# Patient Record
Sex: FEMALE | Race: WHITE | NOT HISPANIC OR LATINO | Employment: FULL TIME | URBAN - METROPOLITAN AREA
[De-identification: names, ages, dates, MRNs, and addresses within clinical notes are randomized per-mention and may not be internally consistent; named-entity substitution may affect disease eponyms.]

---

## 2017-01-18 ENCOUNTER — HOSPITAL ENCOUNTER (OUTPATIENT)
Dept: MAMMOGRAPHY | Facility: HOSPITAL | Age: 57
Discharge: HOME | End: 2017-01-18
Admitting: FAMILY MEDICINE

## 2017-01-18 DIAGNOSIS — Z12.31 VISIT FOR SCREENING MAMMOGRAM: ICD-10-CM

## 2017-01-18 PROCEDURE — 77063 BREAST TOMOSYNTHESIS BI: CPT

## 2017-01-18 PROCEDURE — 77063 BREAST TOMOSYNTHESIS BI: CPT | Performed by: RADIOLOGY

## 2017-01-18 PROCEDURE — 77067 SCR MAMMO BI INCL CAD: CPT | Performed by: RADIOLOGY

## 2017-01-18 PROCEDURE — G0202 SCR MAMMO BI INCL CAD: HCPCS

## 2017-01-20 ENCOUNTER — TELEPHONE (OUTPATIENT)
Dept: INTERNAL MEDICINE | Facility: CLINIC | Age: 57
End: 2017-01-20

## 2017-01-20 RX ORDER — OMEPRAZOLE 20 MG/1
20 CAPSULE, DELAYED RELEASE ORAL DAILY
Qty: 30 CAPSULE | Refills: 5 | Status: SHIPPED | OUTPATIENT
Start: 2017-01-20 | End: 2017-06-08 | Stop reason: SDUPTHER

## 2017-01-20 NOTE — TELEPHONE ENCOUNTER
----- Message from Lucila Coleman sent at 1/20/2017  9:42 AM EST -----  Patient called to get a refill of omeprazole called in to [facility] in [place], please. She has none left. Thanks!

## 2017-01-25 ENCOUNTER — HOSPITAL ENCOUNTER (OUTPATIENT)
Dept: ULTRASOUND IMAGING | Facility: HOSPITAL | Age: 57
Discharge: HOME | End: 2017-01-25
Admitting: FAMILY MEDICINE

## 2017-01-25 DIAGNOSIS — R92.8 ABNORMAL MAMMOGRAM: ICD-10-CM

## 2017-01-25 PROCEDURE — 76642 ULTRASOUND BREAST LIMITED: CPT

## 2017-01-25 PROCEDURE — 76642 ULTRASOUND BREAST LIMITED: CPT | Performed by: RADIOLOGY

## 2017-03-02 ENCOUNTER — OFFICE VISIT (OUTPATIENT)
Dept: INTERNAL MEDICINE | Facility: CLINIC | Age: 57
End: 2017-03-02

## 2017-03-02 VITALS
DIASTOLIC BLOOD PRESSURE: 90 MMHG | WEIGHT: 220.6 LBS | TEMPERATURE: 98.3 F | OXYGEN SATURATION: 97 % | SYSTOLIC BLOOD PRESSURE: 126 MMHG | HEIGHT: 66 IN | BODY MASS INDEX: 35.45 KG/M2 | HEART RATE: 80 BPM

## 2017-03-02 DIAGNOSIS — E03.9 ACQUIRED HYPOTHYROIDISM: ICD-10-CM

## 2017-03-02 DIAGNOSIS — F51.01 PRIMARY INSOMNIA: ICD-10-CM

## 2017-03-02 DIAGNOSIS — Z00.00 ANNUAL PHYSICAL EXAM: Primary | ICD-10-CM

## 2017-03-02 DIAGNOSIS — E78.5 HYPERLIPIDEMIA, UNSPECIFIED HYPERLIPIDEMIA TYPE: ICD-10-CM

## 2017-03-02 DIAGNOSIS — H91.93 DECREASED HEARING OF BOTH EARS: ICD-10-CM

## 2017-03-02 LAB
ALBUMIN SERPL-MCNC: 4.4 G/DL (ref 3.2–4.8)
ALBUMIN/GLOB SERPL: 1.4 G/DL (ref 1.5–2.5)
ALP SERPL-CCNC: 79 U/L (ref 25–100)
ALT SERPL W P-5'-P-CCNC: 18 U/L (ref 7–40)
ANION GAP SERPL CALCULATED.3IONS-SCNC: 6 MMOL/L (ref 3–11)
ARTICHOKE IGE QN: 179 MG/DL (ref 0–130)
AST SERPL-CCNC: 18 U/L (ref 0–33)
BASOPHILS # BLD AUTO: 0.03 10*3/MM3 (ref 0–0.2)
BASOPHILS NFR BLD AUTO: 0.5 % (ref 0–1)
BILIRUB BLD-MCNC: NEGATIVE MG/DL
BILIRUB SERPL-MCNC: 0.4 MG/DL (ref 0.3–1.2)
BUN BLD-MCNC: 13 MG/DL (ref 9–23)
BUN/CREAT SERPL: 21.7 (ref 7–25)
CALCIUM SPEC-SCNC: 9.7 MG/DL (ref 8.7–10.4)
CHLORIDE SERPL-SCNC: 103 MMOL/L (ref 99–109)
CHOLEST SERPL-MCNC: 236 MG/DL (ref 0–200)
CLARITY, POC: CLEAR
CO2 SERPL-SCNC: 28 MMOL/L (ref 20–31)
COLOR UR: YELLOW
CREAT BLD-MCNC: 0.6 MG/DL (ref 0.6–1.3)
DEPRECATED RDW RBC AUTO: 40.1 FL (ref 37–54)
DEVELOPER EXPIRATION DATE: NORMAL
DEVELOPER LOT NUMBER: NORMAL
EOSINOPHIL # BLD AUTO: 0.13 10*3/MM3 (ref 0.1–0.3)
EOSINOPHIL NFR BLD AUTO: 2.1 % (ref 0–3)
ERYTHROCYTE [DISTWIDTH] IN BLOOD BY AUTOMATED COUNT: 12.6 % (ref 11.3–14.5)
EXPIRATION DATE: NORMAL
FECAL OCCULT BLOOD SCREEN, POC: NEGATIVE
GFR SERPL CREATININE-BSD FRML MDRD: 104 ML/MIN/1.73
GLOBULIN UR ELPH-MCNC: 3.1 GM/DL
GLUCOSE BLD-MCNC: 83 MG/DL (ref 70–100)
GLUCOSE UR STRIP-MCNC: NEGATIVE MG/DL
HCT VFR BLD AUTO: 42.1 % (ref 34.5–44)
HDLC SERPL-MCNC: 57 MG/DL (ref 40–60)
HGB BLD-MCNC: 14.4 G/DL (ref 11.5–15.5)
IMM GRANULOCYTES # BLD: 0.03 10*3/MM3 (ref 0–0.03)
IMM GRANULOCYTES NFR BLD: 0.5 % (ref 0–0.6)
KETONES UR QL: NEGATIVE
LEUKOCYTE EST, POC: NEGATIVE
LYMPHOCYTES # BLD AUTO: 2.75 10*3/MM3 (ref 0.6–4.8)
LYMPHOCYTES NFR BLD AUTO: 43.5 % (ref 24–44)
Lab: NORMAL
MCH RBC QN AUTO: 30.1 PG (ref 27–31)
MCHC RBC AUTO-ENTMCNC: 34.2 G/DL (ref 32–36)
MCV RBC AUTO: 87.9 FL (ref 80–99)
MONOCYTES # BLD AUTO: 0.48 10*3/MM3 (ref 0–1)
MONOCYTES NFR BLD AUTO: 7.6 % (ref 0–12)
NEGATIVE CONTROL: NEGATIVE
NEUTROPHILS # BLD AUTO: 2.9 10*3/MM3 (ref 1.5–8.3)
NEUTROPHILS NFR BLD AUTO: 45.8 % (ref 41–71)
NITRITE UR-MCNC: NEGATIVE MG/ML
PH UR: 7 [PH] (ref 5–8)
PLATELET # BLD AUTO: 195 10*3/MM3 (ref 150–450)
PMV BLD AUTO: 10.9 FL (ref 6–12)
POSITIVE CONTROL: POSITIVE
POTASSIUM BLD-SCNC: 4.2 MMOL/L (ref 3.5–5.5)
PROT SERPL-MCNC: 7.5 G/DL (ref 5.7–8.2)
PROT UR STRIP-MCNC: NEGATIVE MG/DL
RBC # BLD AUTO: 4.79 10*6/MM3 (ref 3.89–5.14)
RBC # UR STRIP: NEGATIVE /UL
SODIUM BLD-SCNC: 137 MMOL/L (ref 132–146)
SP GR UR: 1.01 (ref 1–1.03)
T4 FREE SERPL-MCNC: 1.49 NG/DL (ref 0.89–1.76)
TRIGL SERPL-MCNC: 127 MG/DL (ref 0–150)
TSH SERPL DL<=0.05 MIU/L-ACNC: 1.64 MIU/ML (ref 0.35–5.35)
UROBILINOGEN UR QL: NORMAL
WBC NRBC COR # BLD: 6.32 10*3/MM3 (ref 3.5–10.8)

## 2017-03-02 PROCEDURE — 80053 COMPREHEN METABOLIC PANEL: CPT | Performed by: FAMILY MEDICINE

## 2017-03-02 PROCEDURE — 84443 ASSAY THYROID STIM HORMONE: CPT | Performed by: FAMILY MEDICINE

## 2017-03-02 PROCEDURE — 84439 ASSAY OF FREE THYROXINE: CPT | Performed by: FAMILY MEDICINE

## 2017-03-02 PROCEDURE — 99396 PREV VISIT EST AGE 40-64: CPT | Performed by: FAMILY MEDICINE

## 2017-03-02 PROCEDURE — 81003 URINALYSIS AUTO W/O SCOPE: CPT | Performed by: FAMILY MEDICINE

## 2017-03-02 PROCEDURE — 85025 COMPLETE CBC W/AUTO DIFF WBC: CPT | Performed by: FAMILY MEDICINE

## 2017-03-02 PROCEDURE — 82270 OCCULT BLOOD FECES: CPT | Performed by: FAMILY MEDICINE

## 2017-03-02 PROCEDURE — 80061 LIPID PANEL: CPT | Performed by: FAMILY MEDICINE

## 2017-03-02 RX ORDER — NAPROXEN 500 MG/1
500 TABLET ORAL NIGHTLY PRN
Qty: 30 TABLET | Refills: 2 | Status: SHIPPED | OUTPATIENT
Start: 2017-03-02 | End: 2017-06-08 | Stop reason: SDUPTHER

## 2017-03-02 RX ORDER — NAPROXEN 500 MG/1
1 TABLET ORAL NIGHTLY PRN
Refills: 0 | COMMUNITY
Start: 2017-01-29 | End: 2017-03-02 | Stop reason: SDUPTHER

## 2017-03-02 RX ORDER — FLUTICASONE PROPIONATE 50 MCG
2 SPRAY, SUSPENSION (ML) NASAL DAILY
Qty: 1 EACH | Refills: 5 | Status: SHIPPED | OUTPATIENT
Start: 2017-03-02 | End: 2017-06-08 | Stop reason: SDUPTHER

## 2017-03-02 RX ORDER — ZOLPIDEM TARTRATE 10 MG/1
10 TABLET ORAL NIGHTLY PRN
Qty: 30 TABLET | Refills: 2 | Status: SHIPPED | OUTPATIENT
Start: 2017-03-02 | End: 2017-05-29 | Stop reason: SDUPTHER

## 2017-03-02 NOTE — PROGRESS NOTES
"Wendy Lincoln is a 56 y.o. female.     Chief Complaint   Patient presents with   • Annual Exam     just got over the flu       Vitals:    03/02/17 0903   BP: 126/90   Pulse: 80   Temp: 98.3 °F (36.8 °C)   SpO2: 97%   Weight: 220 lb 9.6 oz (100 kg)   Height: 65.8\" (167.1 cm)       Insomnia   This is a chronic problem. The current episode started more than 1 year ago. The problem occurs constantly. The problem has been unchanged. Associated symptoms include arthralgias. Pertinent negatives include no abdominal pain, anorexia, change in bowel habit, chest pain, chills, congestion, coughing, diaphoresis, fatigue, fever, headaches, joint swelling, myalgias, nausea, neck pain, numbness, rash, sore throat, swollen glands, urinary symptoms, vertigo, visual change, vomiting or weakness. Nothing aggravates the symptoms. Treatments tried: ambien. The treatment provided significant relief.      Pt here for annual exam. Pt is s/p hysterectomy. Pt needs refill ambien.   Pt recently had the flu and had been on zyrtec D.   Pt has noticed decreased hearing with cell phone, that started before recent illness.   The following portions of the patient's history were reviewed and updated as appropriate: allergies, current medications, past family history, past medical history, past social history, past surgical history and problem list.    Past Medical History   Diagnosis Date   • Anxiety    • Breast cyst 01/25/2017     right on sono   • Calculus of kidney    • Carbuncle and furuncle of buttock 08/16/2012   • Chalazion    • GERD (gastroesophageal reflux disease)    • Goiter    • Hyperlipidemia    • Hypothyroid    • Insomnia    • Persistent disorder of initiating or maintaining sleep    • Routine general medical examination at a health care facility 02/19/2013   • Routine general medical examination at a health care facility 02/16/2012   • Sacroiliac pain    • Sicca syndrome    • Sleep apnea    • Visit for routine gyn exam " 02/16/2012       Past Surgical History   Procedure Laterality Date   • Replacement total knee bilateral     • Total abdominal hysterectomy with salpingo oophorectomy  2002     Allergies   Allergen Reactions   • Erythromycin Nausea Only   • Morphine And Related    • Requip [Ropinirole Hcl] Nausea Only       Family History   Problem Relation Age of Onset   • Thyroid disease Mother    • Memory loss Mother    • Alzheimer's disease Mother    • Other Mother      BCCA; Blood Clot   • Heart disease Father    • Atrial fibrillation Father    • Cirrhosis Father    • Depression Father    • Other Father      SCCA   • Alzheimer's disease Maternal Aunt    • Breast cancer Maternal Grandmother 80   • Ovarian cancer Neg Hx        Social History     Social History   • Marital status:      Spouse name: N/A   • Number of children: N/A   • Years of education: N/A     Occupational History   • Not on file.     Social History Main Topics   • Smoking status: Former Smoker   • Smokeless tobacco: Never Used      Comment: quit 30 + years   • Alcohol use 3.0 oz/week     5 Glasses of wine per week      Comment: red wine   • Drug use: No   • Sexual activity: Yes     Birth control/ protection: None     Other Topics Concern   • Not on file     Social History Narrative       Review of Systems   Constitutional: Negative.  Negative for activity change, appetite change, chills, diaphoresis, fatigue, fever and unexpected weight change.   HENT: Positive for hearing loss, postnasal drip and rhinorrhea. Negative for congestion, dental problem, drooling, ear discharge, ear pain, facial swelling, mouth sores, nosebleeds, sinus pressure, sneezing, sore throat, tinnitus, trouble swallowing and voice change.    Eyes: Negative.  Negative for photophobia, pain, discharge, redness, itching and visual disturbance.   Respiratory: Negative.  Negative for apnea, cough, choking, chest tightness, shortness of breath, wheezing and stridor.    Cardiovascular:  Negative.  Negative for chest pain, palpitations and leg swelling.   Gastrointestinal: Negative.  Negative for abdominal distention, abdominal pain, anal bleeding, anorexia, blood in stool, change in bowel habit, constipation, diarrhea, nausea, rectal pain and vomiting.   Endocrine: Negative.  Negative for cold intolerance, heat intolerance, polydipsia, polyphagia and polyuria.   Genitourinary: Negative.  Negative for decreased urine volume, difficulty urinating, dyspareunia, dysuria, enuresis, flank pain, frequency, genital sores, hematuria, menstrual problem, pelvic pain, urgency, vaginal bleeding, vaginal discharge and vaginal pain.   Musculoskeletal: Positive for arthralgias. Negative for back pain, gait problem, joint swelling, myalgias, neck pain and neck stiffness.   Skin: Negative.  Negative for color change, pallor, rash and wound.   Allergic/Immunologic: Negative.  Negative for environmental allergies, food allergies and immunocompromised state.   Neurological: Negative.  Negative for dizziness, vertigo, tremors, seizures, syncope, facial asymmetry, speech difficulty, weakness, light-headedness, numbness and headaches.   Hematological: Negative.  Negative for adenopathy. Does not bruise/bleed easily.   Psychiatric/Behavioral: Negative for agitation, behavioral problems, confusion, decreased concentration, dysphoric mood, hallucinations, self-injury, sleep disturbance and suicidal ideas. The patient has insomnia. The patient is not nervous/anxious and is not hyperactive.        Objective   Physical Exam   Constitutional: She is oriented to person, place, and time. She appears well-developed and well-nourished. No distress.   HENT:   Head: Normocephalic and atraumatic.   Right Ear: Tympanic membrane, external ear and ear canal normal.   Left Ear: Tympanic membrane, external ear and ear canal normal.   Nose: Rhinorrhea present. No sinus tenderness. Right sinus exhibits no maxillary sinus tenderness and no  frontal sinus tenderness. Left sinus exhibits no maxillary sinus tenderness and no frontal sinus tenderness.   Mouth/Throat: Uvula is midline, oropharynx is clear and moist and mucous membranes are normal. No oropharyngeal exudate, posterior oropharyngeal edema or posterior oropharyngeal erythema. Tonsils are 3+ on the right. Tonsils are 3+ on the left. No tonsillar exudate.   Eyes: Conjunctivae, EOM and lids are normal. Pupils are equal, round, and reactive to light. Right eye exhibits no chemosis, no discharge, no exudate and no hordeolum. No foreign body present in the right eye. Left eye exhibits no chemosis, no discharge and no exudate. No foreign body present in the left eye. No scleral icterus.   Fundoscopic exam:       The right eye shows no AV nicking, no exudate, no hemorrhage and no papilledema. The right eye shows red reflex.        The left eye shows no AV nicking, no exudate, no hemorrhage and no papilledema. The left eye shows red reflex.   Neck: Trachea normal and normal range of motion. Neck supple. Carotid bruit is not present. No tracheal deviation present. No thyroid mass and no thyromegaly present.   Cardiovascular: Normal rate, regular rhythm, normal heart sounds and intact distal pulses.  Exam reveals no gallop and no friction rub.    No murmur heard.  Pulmonary/Chest: Effort normal and breath sounds normal. No respiratory distress. She has no decreased breath sounds. She has no wheezes. She has no rhonchi. She has no rales. Chest wall is not dull to percussion. She exhibits no tenderness. Right breast exhibits no inverted nipple, no mass, no nipple discharge, no skin change and no tenderness. Left breast exhibits no inverted nipple, no mass, no nipple discharge, no skin change and no tenderness.   Abdominal: Soft. Bowel sounds are normal. She exhibits no distension and no mass. There is no hepatosplenomegaly. There is no tenderness. There is no rebound and no guarding. Hernia confirmed  negative in the right inguinal area and confirmed negative in the left inguinal area.   Genitourinary: Rectum normal. Rectal exam shows no external hemorrhoid, no internal hemorrhoid, no fissure, no mass, no tenderness, anal tone normal and guaiac negative stool. There is no rash, tenderness, lesion or injury on the right labia. There is no rash, tenderness, lesion or injury on the left labia.   Genitourinary Comments: Uterus surgically absent.   Musculoskeletal: Normal range of motion. She exhibits no edema, tenderness or deformity.   Lymphadenopathy:     She has no cervical adenopathy.        Right: No inguinal adenopathy present.        Left: No inguinal adenopathy present.   Neurological: She is alert and oriented to person, place, and time. She has normal reflexes. No cranial nerve deficit. Coordination normal.   Skin: Skin is warm and dry. No rash noted. She is not diaphoretic.   Psychiatric: She has a normal mood and affect. Her behavior is normal. Judgment and thought content normal.   Nursing note and vitals reviewed.      Assessment/Plan   Ana was seen today for annual exam.    Diagnoses and all orders for this visit:    Annual physical exam  -     POCT urinalysis dipstick, automated  -     CBC & Differential  -     POC Occult Blood Stool    Primary insomnia  -     zolpidem (AMBIEN) 10 MG tablet; Take 1 tablet by mouth At Night As Needed for sleep.    Decreased hearing of both ears  -     Ambulatory Referral to Audiology    Hyperlipidemia, unspecified hyperlipidemia type  -     Comprehensive Metabolic Panel  -     Lipid Panel    Acquired hypothyroidism  -     TSH  -     T4, Free    Other orders  -     naproxen (NAPROSYN) 500 MG tablet; Take 1 tablet by mouth At Night As Needed for mild pain (1-3).  -     fluticasone (FLONASE) 50 MCG/ACT nasal spray; 2 sprays into each nostril Daily. Administer 2 sprays in each nostril for each dose.        Pt has had mammo in January.  Pt is up to date on tetanus,  influenza and colonoscopy.  Pt had hysterectomy, early pap not indicated.   Pt to check with her  if she snores  Healthy eating and regular exercise         Current Outpatient Prescriptions:   •  cetirizine (ZyrTEC) 10 MG tablet, Take 10 mg by mouth daily., Disp: , Rfl:   •  fluticasone (FLONASE) 50 MCG/ACT nasal spray, 2 sprays into each nostril Daily. Administer 2 sprays in each nostril for each dose., Disp: 1 each, Rfl: 5  •  levothyroxine (SYNTHROID, LEVOTHROID) 150 MCG tablet, Take 1 tablet by mouth Daily., Disp: 90 tablet, Rfl: 1  •  omeprazole (priLOSEC) 20 MG capsule, Take 1 capsule by mouth Daily., Disp: 30 capsule, Rfl: 5  •  zolpidem (AMBIEN) 10 MG tablet, Take 1 tablet by mouth At Night As Needed for sleep., Disp: 30 tablet, Rfl: 2  •  naproxen (NAPROSYN) 500 MG tablet, Take 1 tablet by mouth At Night As Needed for mild pain (1-3)., Disp: 30 tablet, Rfl: 2    Return in about 2 weeks (around 3/16/2017), or if symptoms worsen or fail to improve, for Recheck bp with nurse.

## 2017-03-02 NOTE — PROGRESS NOTES
Please call the patient regarding her abnormal result. Lipids high, low animal fat, low sugar diet. If already following the diet add statins. LDL has increased since last lab.

## 2017-03-03 ENCOUNTER — TELEPHONE (OUTPATIENT)
Dept: INTERNAL MEDICINE | Facility: CLINIC | Age: 57
End: 2017-03-03

## 2017-03-03 NOTE — TELEPHONE ENCOUNTER
----- Message from Bryanna PEREA MD sent at 3/2/2017  6:25 PM EST -----  Please call the patient regarding her abnormal result. Lipids high, low animal fat, low sugar diet. If already following the diet add statins. LDL has increased since last lab.

## 2017-03-10 ENCOUNTER — TELEPHONE (OUTPATIENT)
Dept: INTERNAL MEDICINE | Facility: CLINIC | Age: 57
End: 2017-03-10

## 2017-03-10 NOTE — TELEPHONE ENCOUNTER
----- Message from Bryanna PEREA MD sent at 3/2/2017  9:49 AM EST -----  Regarding: lip biopsy  Check with Dr Guerrero office for lip bx path report

## 2017-05-21 DIAGNOSIS — E03.9 ACQUIRED HYPOTHYROIDISM: ICD-10-CM

## 2017-05-22 RX ORDER — LEVOTHYROXINE SODIUM 0.15 MG/1
TABLET ORAL
Qty: 90 TABLET | Refills: 0 | Status: SHIPPED | OUTPATIENT
Start: 2017-05-22 | End: 2017-06-08 | Stop reason: SDUPTHER

## 2017-05-29 DIAGNOSIS — F51.01 PRIMARY INSOMNIA: ICD-10-CM

## 2017-05-30 RX ORDER — ZOLPIDEM TARTRATE 10 MG/1
TABLET ORAL
Qty: 30 TABLET | Refills: 0 | Status: SHIPPED | OUTPATIENT
Start: 2017-05-30 | End: 2017-06-08 | Stop reason: SDUPTHER

## 2017-06-08 ENCOUNTER — OFFICE VISIT (OUTPATIENT)
Dept: INTERNAL MEDICINE | Facility: CLINIC | Age: 57
End: 2017-06-08

## 2017-06-08 VITALS
BODY MASS INDEX: 35.97 KG/M2 | DIASTOLIC BLOOD PRESSURE: 84 MMHG | OXYGEN SATURATION: 98 % | HEIGHT: 66 IN | HEART RATE: 79 BPM | TEMPERATURE: 97.8 F | SYSTOLIC BLOOD PRESSURE: 124 MMHG | WEIGHT: 223.8 LBS

## 2017-06-08 DIAGNOSIS — E03.9 ACQUIRED HYPOTHYROIDISM: ICD-10-CM

## 2017-06-08 DIAGNOSIS — F51.01 PRIMARY INSOMNIA: ICD-10-CM

## 2017-06-08 DIAGNOSIS — E78.5 HYPERLIPIDEMIA, UNSPECIFIED HYPERLIPIDEMIA TYPE: Primary | ICD-10-CM

## 2017-06-08 DIAGNOSIS — K21.9 GASTROESOPHAGEAL REFLUX DISEASE WITHOUT ESOPHAGITIS: ICD-10-CM

## 2017-06-08 DIAGNOSIS — J30.89 PERENNIAL ALLERGIC RHINITIS, UNSPECIFIED ALLERGIC RHINITIS TRIGGER: ICD-10-CM

## 2017-06-08 PROCEDURE — 99214 OFFICE O/P EST MOD 30 MIN: CPT | Performed by: FAMILY MEDICINE

## 2017-06-08 RX ORDER — NAPROXEN 500 MG/1
500 TABLET ORAL NIGHTLY PRN
Qty: 30 TABLET | Refills: 5 | Status: SHIPPED | OUTPATIENT
Start: 2017-06-08 | End: 2017-12-15 | Stop reason: SDUPTHER

## 2017-06-08 RX ORDER — FLUTICASONE PROPIONATE 50 MCG
2 SPRAY, SUSPENSION (ML) NASAL DAILY
Qty: 1 EACH | Refills: 5 | Status: SHIPPED | OUTPATIENT
Start: 2017-06-08 | End: 2017-12-15 | Stop reason: SDUPTHER

## 2017-06-08 RX ORDER — ZOLPIDEM TARTRATE 10 MG/1
10 TABLET ORAL NIGHTLY PRN
Qty: 30 TABLET | Refills: 0 | Status: SHIPPED | OUTPATIENT
Start: 2017-06-08 | End: 2017-09-15 | Stop reason: SDUPTHER

## 2017-06-08 RX ORDER — OMEPRAZOLE 20 MG/1
20 CAPSULE, DELAYED RELEASE ORAL DAILY
Qty: 30 CAPSULE | Refills: 5 | Status: SHIPPED | OUTPATIENT
Start: 2017-06-08 | End: 2017-12-15 | Stop reason: SDUPTHER

## 2017-06-08 RX ORDER — LEVOTHYROXINE SODIUM 0.15 MG/1
150 TABLET ORAL DAILY
Qty: 30 TABLET | Refills: 5 | Status: SHIPPED | OUTPATIENT
Start: 2017-06-08 | End: 2017-12-15 | Stop reason: SDUPTHER

## 2017-06-08 NOTE — PROGRESS NOTES
"Subjective   Ana Lincoln is a 56 y.o. female.     Chief Complaint   Patient presents with   • Hyperlipidemia     3 month follow up   • Hypothyroidism   • Insomnia   • Med Refill       Visit Vitals   • /84   • Pulse 79   • Temp 97.8 °F (36.6 °C)   • Ht 65.8\" (167.1 cm)   • Wt 223 lb 12.8 oz (102 kg)   • LMP  (LMP Unknown)   • SpO2 98%   • BMI 36.34 kg/m2       Hyperlipidemia   This is a new problem. This is a new diagnosis. The problem is uncontrolled. Recent lipid tests were reviewed and are high. Exacerbating diseases include hypothyroidism and obesity. She has no history of chronic renal disease, diabetes or nephrotic syndrome. Pertinent negatives include no chest pain, focal sensory loss, focal weakness, leg pain, myalgias or shortness of breath. She is currently on no antihyperlipidemic treatment. Compliance problems include adherence to diet.  Risk factors for coronary artery disease include dyslipidemia and a sedentary lifestyle.   Hypothyroidism   This is a chronic problem. The current episode started more than 1 year ago. The problem occurs constantly. The problem has been unchanged. Associated symptoms include arthralgias and congestion. Pertinent negatives include no abdominal pain, anorexia, change in bowel habit, chest pain, chills, coughing, diaphoresis, fatigue, fever, headaches, joint swelling, myalgias, nausea, neck pain, numbness, rash, sore throat, swollen glands, urinary symptoms, vertigo, visual change, vomiting or weakness. Nothing aggravates the symptoms. Treatments tried: thyroid. The treatment provided significant relief.   Insomnia   This is a chronic problem. The current episode started more than 1 year ago. The problem occurs constantly. The problem has been unchanged. Associated symptoms include arthralgias and congestion. Pertinent negatives include no abdominal pain, anorexia, change in bowel habit, chest pain, chills, coughing, diaphoresis, fatigue, fever, headaches, joint " swelling, myalgias, nausea, neck pain, numbness, rash, sore throat, swollen glands, urinary symptoms, vertigo, visual change, vomiting or weakness. Nothing aggravates the symptoms. Treatments tried: ambien. The treatment provided significant relief.        The following portions of the patient's history were reviewed and updated as appropriate: allergies, current medications, past family history, past medical history, past social history, past surgical history and problem list.     Past Medical History:   Diagnosis Date   • Anxiety    • Breast cyst 01/25/2017    right on sono   • Calculus of kidney    • Carbuncle and furuncle of buttock 08/16/2012   • Chalazion    • GERD (gastroesophageal reflux disease)    • Goiter    • Hyperlipidemia    • Hypothyroid    • Insomnia    • Persistent disorder of initiating or maintaining sleep    • Routine general medical examination at a health care facility 02/19/2013   • Routine general medical examination at a health care facility 02/16/2012   • Sacroiliac pain    • Sicca syndrome    • Sleep apnea    • Visit for routine gyn exam 02/16/2012       Past Surgical History:   Procedure Laterality Date   • REPLACEMENT TOTAL KNEE BILATERAL     • TOTAL ABDOMINAL HYSTERECTOMY WITH SALPINGO OOPHORECTOMY  2002       Allergies   Allergen Reactions   • Erythromycin Nausea Only   • Morphine And Related    • Requip [Ropinirole Hcl] Nausea Only       Family History   Problem Relation Age of Onset   • Thyroid disease Mother    • Memory loss Mother    • Alzheimer's disease Mother    • Other Mother      BCCA; Blood Clot   • Heart disease Father    • Atrial fibrillation Father    • Cirrhosis Father    • Depression Father    • Other Father      SCCA   • Alzheimer's disease Maternal Aunt    • Breast cancer Maternal Grandmother 80   • Ovarian cancer Neg Hx        Social History     Social History   • Marital status:      Spouse name: N/A   • Number of children: N/A   • Years of education: N/A      Occupational History   • Not on file.     Social History Main Topics   • Smoking status: Former Smoker   • Smokeless tobacco: Never Used      Comment: quit 30 + years   • Alcohol use 3.0 oz/week     5 Glasses of wine per week      Comment: red wine   • Drug use: No   • Sexual activity: Yes     Birth control/ protection: None     Other Topics Concern   • Not on file     Social History Narrative         Review of Systems   Constitutional: Negative.  Negative for chills, diaphoresis, fatigue and fever.   HENT: Positive for congestion, postnasal drip and rhinorrhea. Negative for ear pain, nosebleeds, sinus pressure, sneezing and sore throat.    Eyes: Negative.  Negative for redness and itching.   Respiratory: Negative.  Negative for cough, shortness of breath and wheezing.    Cardiovascular: Negative.  Negative for chest pain and palpitations.   Gastrointestinal: Negative.  Negative for abdominal pain, anorexia, change in bowel habit, constipation, diarrhea, nausea and vomiting.   Endocrine: Negative.  Negative for cold intolerance and heat intolerance.   Genitourinary: Negative.  Negative for dysuria, frequency, hematuria and urgency.   Musculoskeletal: Positive for arthralgias. Negative for back pain, joint swelling, myalgias and neck pain.   Skin: Negative.  Negative for color change and rash.   Allergic/Immunologic: Positive for environmental allergies.   Neurological: Negative.  Negative for dizziness, vertigo, focal weakness, syncope, weakness, light-headedness, numbness and headaches.   Hematological: Negative.  Negative for adenopathy. Does not bruise/bleed easily.   Psychiatric/Behavioral: Negative for dysphoric mood. The patient has insomnia. The patient is not nervous/anxious.        Objective   Physical Exam   Constitutional: She is oriented to person, place, and time. She appears well-developed.   HENT:   Head: Normocephalic.   Right Ear: Tympanic membrane, external ear and ear canal normal.   Left Ear:  Tympanic membrane, external ear and ear canal normal.   Nose: Nose normal.   Mouth/Throat: Uvula is midline, oropharynx is clear and moist and mucous membranes are normal. No oropharyngeal exudate, posterior oropharyngeal edema or posterior oropharyngeal erythema.   Eyes: Conjunctivae and EOM are normal. Pupils are equal, round, and reactive to light.   Neck: Normal range of motion. Neck supple. Carotid bruit is not present. No thyroid mass and no thyromegaly present.   Cardiovascular: Normal rate and regular rhythm.    No murmur heard.  Pulmonary/Chest: Effort normal and breath sounds normal. No respiratory distress. She has no decreased breath sounds. She has no wheezes. She has no rhonchi. She has no rales.   Abdominal: Soft. Bowel sounds are normal. There is no tenderness.   Musculoskeletal: Normal range of motion.   Neurological: She is alert and oriented to person, place, and time.   Skin: Skin is warm and dry.   Psychiatric: She has a normal mood and affect. Her behavior is normal.   Nursing note and vitals reviewed.      Assessment/Plan   Ana was seen today for hyperlipidemia, hypothyroidism, insomnia and med refill.    Diagnoses and all orders for this visit:    Hyperlipidemia, unspecified hyperlipidemia type  -     Comprehensive Metabolic Panel; Future  -     Lipid Panel; Future    Primary insomnia  -     zolpidem (AMBIEN) 10 MG tablet; Take 1 tablet by mouth At Night As Needed for Sleep.    Acquired hypothyroidism  -     levothyroxine (SYNTHROID, LEVOTHROID) 150 MCG tablet; Take 1 tablet by mouth Daily.  -     T4, Free; Future  -     TSH; Future    Gastroesophageal reflux disease without esophagitis    Perennial allergic rhinitis, unspecified allergic rhinitis trigger  -     fluticasone (FLONASE) 50 MCG/ACT nasal spray; 2 sprays into each nostril Daily. Administer 2 sprays in each nostril for each dose.    Other orders  -     omeprazole (priLOSEC) 20 MG capsule; Take 1 capsule by mouth Daily.  -      naproxen (NAPROSYN) 500 MG tablet; Take 1 tablet by mouth At Night As Needed for Mild Pain (1-3).                   Current Outpatient Prescriptions:   •  cetirizine (ZyrTEC) 10 MG tablet, Take 10 mg by mouth daily., Disp: , Rfl:   •  fluticasone (FLONASE) 50 MCG/ACT nasal spray, 2 sprays into each nostril Daily. Administer 2 sprays in each nostril for each dose., Disp: 1 each, Rfl: 5  •  levothyroxine (SYNTHROID, LEVOTHROID) 150 MCG tablet, Take 1 tablet by mouth Daily., Disp: 30 tablet, Rfl: 5  •  naproxen (NAPROSYN) 500 MG tablet, Take 1 tablet by mouth At Night As Needed for Mild Pain (1-3)., Disp: 30 tablet, Rfl: 5  •  omeprazole (priLOSEC) 20 MG capsule, Take 1 capsule by mouth Daily., Disp: 30 capsule, Rfl: 5  •  zolpidem (AMBIEN) 10 MG tablet, Take 1 tablet by mouth At Night As Needed for Sleep., Disp: 30 tablet, Rfl: 0    Return in about 3 months (around 9/8/2017) for Recheck.

## 2017-09-15 ENCOUNTER — OFFICE VISIT (OUTPATIENT)
Dept: INTERNAL MEDICINE | Facility: CLINIC | Age: 57
End: 2017-09-15

## 2017-09-15 VITALS
SYSTOLIC BLOOD PRESSURE: 108 MMHG | HEART RATE: 75 BPM | DIASTOLIC BLOOD PRESSURE: 76 MMHG | WEIGHT: 226.4 LBS | BODY MASS INDEX: 36.76 KG/M2 | OXYGEN SATURATION: 98 % | TEMPERATURE: 98.3 F

## 2017-09-15 DIAGNOSIS — E78.5 HYPERLIPIDEMIA, UNSPECIFIED HYPERLIPIDEMIA TYPE: ICD-10-CM

## 2017-09-15 DIAGNOSIS — F51.01 PRIMARY INSOMNIA: ICD-10-CM

## 2017-09-15 DIAGNOSIS — Z11.59 NEED FOR HEPATITIS C SCREENING TEST: Primary | ICD-10-CM

## 2017-09-15 DIAGNOSIS — E03.9 ACQUIRED HYPOTHYROIDISM: ICD-10-CM

## 2017-09-15 LAB
ALBUMIN SERPL-MCNC: 4.3 G/DL (ref 3.2–4.8)
ALBUMIN/GLOB SERPL: 1.5 G/DL (ref 1.5–2.5)
ALP SERPL-CCNC: 92 U/L (ref 25–100)
ALT SERPL W P-5'-P-CCNC: 17 U/L (ref 7–40)
ANION GAP SERPL CALCULATED.3IONS-SCNC: 3 MMOL/L (ref 3–11)
ARTICHOKE IGE QN: 154 MG/DL (ref 0–130)
AST SERPL-CCNC: 19 U/L (ref 0–33)
BILIRUB SERPL-MCNC: 0.5 MG/DL (ref 0.3–1.2)
BUN BLD-MCNC: 12 MG/DL (ref 9–23)
BUN/CREAT SERPL: 20 (ref 7–25)
CALCIUM SPEC-SCNC: 9.3 MG/DL (ref 8.7–10.4)
CHLORIDE SERPL-SCNC: 108 MMOL/L (ref 99–109)
CHOLEST SERPL-MCNC: 216 MG/DL (ref 0–200)
CO2 SERPL-SCNC: 29 MMOL/L (ref 20–31)
CREAT BLD-MCNC: 0.6 MG/DL (ref 0.6–1.3)
GFR SERPL CREATININE-BSD FRML MDRD: 104 ML/MIN/1.73
GLOBULIN UR ELPH-MCNC: 2.8 GM/DL
GLUCOSE BLD-MCNC: 90 MG/DL (ref 70–100)
HCV AB SER DONR QL: NORMAL
HDLC SERPL-MCNC: 55 MG/DL (ref 40–60)
POTASSIUM BLD-SCNC: 4.3 MMOL/L (ref 3.5–5.5)
PROT SERPL-MCNC: 7.1 G/DL (ref 5.7–8.2)
SODIUM BLD-SCNC: 140 MMOL/L (ref 132–146)
T4 FREE SERPL-MCNC: 1.41 NG/DL (ref 0.89–1.76)
TRIGL SERPL-MCNC: 106 MG/DL (ref 0–150)
TSH SERPL DL<=0.05 MIU/L-ACNC: 2.24 MIU/ML (ref 0.35–5.35)

## 2017-09-15 PROCEDURE — 86803 HEPATITIS C AB TEST: CPT | Performed by: FAMILY MEDICINE

## 2017-09-15 PROCEDURE — 84439 ASSAY OF FREE THYROXINE: CPT | Performed by: FAMILY MEDICINE

## 2017-09-15 PROCEDURE — 80061 LIPID PANEL: CPT | Performed by: FAMILY MEDICINE

## 2017-09-15 PROCEDURE — 84443 ASSAY THYROID STIM HORMONE: CPT | Performed by: FAMILY MEDICINE

## 2017-09-15 PROCEDURE — 80053 COMPREHEN METABOLIC PANEL: CPT | Performed by: FAMILY MEDICINE

## 2017-09-15 PROCEDURE — 99213 OFFICE O/P EST LOW 20 MIN: CPT | Performed by: FAMILY MEDICINE

## 2017-09-15 RX ORDER — ZOLPIDEM TARTRATE 10 MG/1
10 TABLET ORAL NIGHTLY PRN
Qty: 30 TABLET | Refills: 2 | Status: SHIPPED | OUTPATIENT
Start: 2017-09-15 | End: 2017-12-15 | Stop reason: SDUPTHER

## 2017-09-15 NOTE — PROGRESS NOTES
Please call the patient regarding her abnormal result.  LDL cholesterol is staying high.  Need to consider statins.

## 2017-09-15 NOTE — PROGRESS NOTES
Subjective   Ana Lincoln is a 57 y.o. female.     Chief Complaint   Patient presents with   • medical management     3 month follow up   • Insomnia     does well on medication       Visit Vitals   • /76   • Pulse 75   • Temp 98.3 °F (36.8 °C)   • Wt 226 lb 6.4 oz (103 kg)   • LMP  (LMP Unknown)   • SpO2 98%   • BMI 36.76 kg/m2       Insomnia   This is a chronic problem. The current episode started more than 1 year ago. The problem occurs constantly. The problem has been unchanged. Associated symptoms include congestion. Pertinent negatives include no abdominal pain, anorexia, arthralgias, change in bowel habit, chest pain, chills, coughing, diaphoresis, fatigue, fever, headaches, joint swelling, myalgias, nausea, neck pain, numbness, rash, sore throat, swollen glands, urinary symptoms, vertigo, visual change, vomiting or weakness. Nothing aggravates the symptoms. Treatments tried: ambien. The treatment provided significant relief.      Pt as not gotten lab work for lipids and thyroid yet , but is fasting. Will get lab today.  The following portions of the patient's history were reviewed and updated as appropriate: allergies, current medications, past family history, past medical history, past social history, past surgical history and problem list.    Past Medical History:   Diagnosis Date   • Anxiety    • Breast cyst 01/25/2017    right on sono   • Calculus of kidney    • Carbuncle and furuncle of buttock 08/16/2012   • Chalazion    • GERD (gastroesophageal reflux disease)    • Goiter    • Hyperlipidemia    • Hypothyroid    • Insomnia    • Persistent disorder of initiating or maintaining sleep    • Routine general medical examination at a health care facility 02/19/2013   • Routine general medical examination at a health care facility 02/16/2012   • Sacroiliac pain    • Sicca syndrome    • Sleep apnea    • Visit for routine gyn exam 02/16/2012       Past Surgical History:   Procedure Laterality Date   •  REPLACEMENT TOTAL KNEE BILATERAL     • TOTAL ABDOMINAL HYSTERECTOMY WITH SALPINGO OOPHORECTOMY  2002       Allergies   Allergen Reactions   • Erythromycin Nausea Only   • Morphine And Related    • Requip [Ropinirole Hcl] Nausea Only       Family History   Problem Relation Age of Onset   • Thyroid disease Mother    • Memory loss Mother    • Alzheimer's disease Mother    • Other Mother      BCCA; Blood Clot   • Heart disease Father    • Atrial fibrillation Father    • Cirrhosis Father    • Depression Father    • Other Father      SCCA   • Alzheimer's disease Maternal Aunt    • Breast cancer Maternal Grandmother 80   • Ovarian cancer Neg Hx        Social History     Social History   • Marital status:      Spouse name: N/A   • Number of children: N/A   • Years of education: N/A     Occupational History   • Not on file.     Social History Main Topics   • Smoking status: Former Smoker   • Smokeless tobacco: Never Used      Comment: quit 30 + years   • Alcohol use 3.0 oz/week     5 Glasses of wine per week      Comment: red wine   • Drug use: No   • Sexual activity: Yes     Birth control/ protection: None     Other Topics Concern   • Not on file     Social History Narrative       Review of Systems   Constitutional: Negative.  Negative for chills, diaphoresis, fatigue and fever.   HENT: Positive for congestion, postnasal drip and rhinorrhea. Negative for ear pain, nosebleeds, sinus pressure, sneezing and sore throat.    Eyes: Negative.  Negative for redness and itching.   Respiratory: Negative.  Negative for cough, shortness of breath and wheezing.    Cardiovascular: Negative.  Negative for chest pain and palpitations.   Gastrointestinal: Negative.  Negative for abdominal pain, anorexia, change in bowel habit, constipation, diarrhea, nausea and vomiting.   Endocrine: Negative.  Negative for cold intolerance and heat intolerance.   Genitourinary: Negative.  Negative for dysuria, frequency, hematuria and urgency.    Musculoskeletal: Negative.  Negative for arthralgias, back pain, joint swelling, myalgias and neck pain.   Skin: Negative.  Negative for color change and rash.   Allergic/Immunologic: Positive for environmental allergies.   Neurological: Negative.  Negative for dizziness, vertigo, syncope, weakness, light-headedness, numbness and headaches.   Hematological: Negative.  Negative for adenopathy. Does not bruise/bleed easily.   Psychiatric/Behavioral: Positive for sleep disturbance. Negative for dysphoric mood. The patient has insomnia. The patient is not nervous/anxious.        Objective   Physical Exam   Constitutional: She is oriented to person, place, and time. She appears well-developed.   HENT:   Head: Normocephalic.   Right Ear: Tympanic membrane, external ear and ear canal normal.   Left Ear: Tympanic membrane, external ear and ear canal normal.   Nose: Rhinorrhea present. Right sinus exhibits no maxillary sinus tenderness and no frontal sinus tenderness. Left sinus exhibits no maxillary sinus tenderness and no frontal sinus tenderness.   Mouth/Throat: Uvula is midline, oropharynx is clear and moist and mucous membranes are normal. No oropharyngeal exudate, posterior oropharyngeal edema or posterior oropharyngeal erythema.   Eyes: Conjunctivae and EOM are normal. Pupils are equal, round, and reactive to light.   Neck: Trachea normal and normal range of motion. Neck supple. No thyroid mass and no thyromegaly present.   Cardiovascular: Normal rate and regular rhythm.    No murmur heard.  Pulmonary/Chest: Effort normal and breath sounds normal.   Abdominal: Soft. Bowel sounds are normal.   Musculoskeletal: Normal range of motion.   Lymphadenopathy:     She has no cervical adenopathy.   Neurological: She is alert and oriented to person, place, and time.   Skin: Skin is warm and dry.   Psychiatric: She has a normal mood and affect. Her behavior is normal.   Nursing note and vitals reviewed.      Assessment/Plan    Ana was seen today for medical management and insomnia.    Diagnoses and all orders for this visit:    Need for hepatitis C screening test  -     Hepatitis C Antibody    Primary insomnia  -     zolpidem (AMBIEN) 10 MG tablet; Take 1 tablet by mouth At Night As Needed for Sleep.    Hyperlipidemia, unspecified hyperlipidemia type  -     Comprehensive Metabolic Panel  -     Lipid Panel    Acquired hypothyroidism  -     T4, Free  -     TSH          Pt will get flu shot at work next month.          Current Outpatient Prescriptions:   •  cetirizine (ZyrTEC) 10 MG tablet, Take 10 mg by mouth daily., Disp: , Rfl:   •  fluticasone (FLONASE) 50 MCG/ACT nasal spray, 2 sprays into each nostril Daily. Administer 2 sprays in each nostril for each dose., Disp: 1 each, Rfl: 5  •  levothyroxine (SYNTHROID, LEVOTHROID) 150 MCG tablet, Take 1 tablet by mouth Daily., Disp: 30 tablet, Rfl: 5  •  naproxen (NAPROSYN) 500 MG tablet, Take 1 tablet by mouth At Night As Needed for Mild Pain (1-3)., Disp: 30 tablet, Rfl: 5  •  omeprazole (priLOSEC) 20 MG capsule, Take 1 capsule by mouth Daily., Disp: 30 capsule, Rfl: 5  •  zolpidem (AMBIEN) 10 MG tablet, Take 1 tablet by mouth At Night As Needed for Sleep., Disp: 30 tablet, Rfl: 2    Return in about 3 months (around 12/15/2017), or if symptoms worsen or fail to improve, for Recheck.    Roverto report reviewed and is consistent #[id number]    The patient has read and signed the [facility] Controlled Substance Contract.  I will continue to see patient for regular follow up appointments.  They are well controlled on their medication.  ROVERTO is updated every 3 months. The patient is aware of the potential for addiction and dependence.

## 2017-09-15 NOTE — PATIENT INSTRUCTIONS
Exercising to Stay Healthy  Exercising regularly is important. It has many health benefits, such as:  · Improving your overall fitness, flexibility, and endurance.  · Increasing your bone density.  · Helping with weight control.  · Decreasing your body fat.  · Increasing your muscle strength.  · Reducing stress and tension.  · Improving your overall health.  In order to become healthy and stay healthy, it is recommended that you do moderate-intensity and vigorous-intensity exercise. You can tell that you are exercising at a moderate intensity if you have a higher heart rate and faster breathing, but you are still able to hold a conversation. You can tell that you are exercising at a vigorous intensity if you are breathing much harder and faster and cannot hold a conversation while exercising.  HOW OFTEN SHOULD I EXERCISE?  Choose an activity that you enjoy and set realistic goals. Your health care provider can help you to make an activity plan that works for you. Exercise regularly as directed by your health care provider. This may include:   · Doing resistance training twice each week, such as:    Push-ups.    Sit-ups.    Lifting weights.    Using resistance bands.  · Doing a given intensity of exercise for a given amount of time. Choose from these options:    150 minutes of moderate-intensity exercise every week.    75 minutes of vigorous-intensity exercise every week.    A mix of moderate-intensity and vigorous-intensity exercise every week.  Children, pregnant women, people who are out of shape, people who are overweight, and older adults may need to consult a health care provider for individual recommendations. If you have any sort of medical condition, be sure to consult your health care provider before starting a new exercise program.   WHAT ARE SOME EXERCISE IDEAS?  Some moderate-intensity exercise ideas include:   · Walking at a rate of 1 mile in 15  minutes.  · Biking.  · Hiking.  · Golfing.  · Dancing.  Some vigorous-intensity exercise ideas include:   · Walking at a rate of at least 4.5 miles per hour.  · Jogging or running at a rate of 5 miles per hour.  · Biking at a rate of at least 10 miles per hour.  · Lap swimming.  · Roller-skating or in-line skating.  · Cross-country skiing.  · Vigorous competitive sports, such as football, basketball, and soccer.  · Jumping rope.  · Aerobic dancing.  WHAT ARE SOME EVERYDAY ACTIVITIES THAT CAN HELP ME TO GET EXERCISE?  · Yard work, such as:    Pushing a .    Raking and bagging leaves.  · Washing and waxing your car.  · Pushing a stroller.  · Shoveling snow.  · Gardening.  · Washing windows or floors.  HOW CAN I BE MORE ACTIVE IN MY DAY-TO-DAY ACTIVITIES?  · Use the stairs instead of the elevator.  · Take a walk during your lunch break.  · If you drive, park your car farther away from work or school.  · If you take public transportation, get off one stop early and walk the rest of the way.  · Make all of your phone calls while standing up and walking around.  · Get up, stretch, and walk around every 30 minutes throughout the day.  WHAT GUIDELINES SHOULD I FOLLOW WHILE EXERCISING?  · Do not exercise so much that you hurt yourself, feel dizzy, or get very short of breath.  · Consult your health care provider before starting a new exercise program.  · Wear comfortable clothes and shoes with good support.  · Drink plenty of water while you exercise to prevent dehydration or heat stroke. Body water is lost during exercise and must be replaced.  · Work out until you breathe faster and your heart beats faster.     This information is not intended to replace advice given to you by your health care provider. Make sure you discuss any questions you have with your health care provider.     Document Released: 01/20/2012 Document Revised: 01/08/2016 Document Reviewed: 05/21/2015  [employer] Patient Education  ©2017 [employer]  Heart-Healthy Eating Plan  Many factors influence your heart health, including eating and exercise habits. Heart (coronary) risk increases with abnormal blood fat (lipid) levels. Heart-healthy meal planning includes limiting unhealthy fats, increasing healthy fats, and making other small dietary changes. This includes maintaining a healthy body weight to help keep lipid levels within a normal range.  WHAT IS MY PLAN?   Your health care provider recommends that you:  · Get no more than _________% of the total calories in your daily diet from fat.  · Limit your intake of saturated fat to less than _________% of your total calories each day.  · Limit the amount of cholesterol in your diet to less than _________ mg per day.  WHAT TYPES OF FAT SHOULD I CHOOSE?  · Choose healthy fats more often. Choose monounsaturated and polyunsaturated fats, such as olive oil and canola oil, flaxseeds, walnuts, almonds, and seeds.  · Eat more omega-3 fats. Good choices include salmon, mackerel, sardines, tuna, flaxseed oil, and ground flaxseeds. Aim to eat fish at least two times each week.  · Limit saturated fats. Saturated fats are primarily found in animal products, such as meats, butter, and cream. Plant sources of saturated fats include palm oil, palm kernel oil, and coconut oil.  · Avoid foods with partially hydrogenated oils in them. These contain trans fats. Examples of foods that contain trans fats are stick margarine, some tub margarines, cookies, crackers, and other baked goods.  WHAT GENERAL GUIDELINES DO I NEED TO FOLLOW?  · Check food labels carefully to identify foods with trans fats or high amounts of saturated fat.  · Fill one half of your plate with vegetables and green salads. Eat 4-5 servings of vegetables per day. A serving of vegetables equals 1 cup of raw leafy vegetables, ½ cup of raw or cooked cut-up vegetables, or ½ cup of vegetable juice.  · Fill one fourth of your plate with whole grains.  "Look for the word \"whole\" as the first word in the ingredient list.  · Fill one fourth of your plate with lean protein foods.  · Eat 4-5 servings of fruit per day. A serving of fruit equals one medium whole fruit, ¼ cup of dried fruit, ½ cup of fresh, frozen, or canned fruit, or ½ cup of 100% fruit juice.  · Eat more foods that contain soluble fiber. Examples of foods that contain this type of fiber are apples, broccoli, carrots, beans, peas, and barley. Aim to get 20-30 g of fiber per day.  · Eat more home-cooked food and less restaurant, buffet, and fast food.  · Limit or avoid alcohol.  · Limit foods that are high in starch and sugar.  · Avoid fried foods.  · Cook foods by using methods other than frying. Baking, boiling, grilling, and broiling are all great options. Other fat-reducing suggestions include:    Removing the skin from poultry.    Removing all visible fats from meats.    Skimming the fat off of stews, soups, and gravies before serving them.    Steaming vegetables in water or broth.  · Lose weight if you are overweight. Losing just 5-10% of your initial body weight can help your overall health and prevent diseases such as diabetes and heart disease.  · Increase your consumption of nuts, legumes, and seeds to 4-5 servings per week. One serving of dried beans or legumes equals ½ cup after being cooked, one serving of nuts equals 1½ ounces, and one serving of seeds equals ½ ounce or 1 tablespoon.  · You may need to monitor your salt (sodium) intake, especially if you have high blood pressure. Talk with your health care provider or dietitian to get more information about reducing sodium.  WHAT FOODS CAN I EAT?  Grains  Breads, including Danish, white, jodie, wheat, raisin, rye, oatmeal, and Italian. Tortillas that are neither fried nor made with lard or trans fat. Low-fat rolls, including hotdog and hamburger buns and English muffins. Biscuits. Muffins. Waffles. Pancakes. Light popcorn. Whole-grain " cereals. Flatbread. [place] toast. Pretzels. Breadsticks. Rusks. Low-fat snacks and crackers, including oyster, saltine, matzo, david, animal, and rye. Rice and pasta, including brown rice and those that are made with whole wheat.  Vegetables  All vegetables.  Fruits  All fruits, but limit coconut.  Meats and Other Protein Sources  Lean, well-trimmed beef, veal, pork, and lamb. Chicken and turkey without skin. All fish and shellfish. Wild duck, rabbit, pheasant, and venison. Egg whites or low-cholesterol egg substitutes. Dried beans, peas, lentils, and tofu. Seeds and most nuts.  Dairy  Low-fat or nonfat cheeses, including ricotta, string, and mozzarella. Skim or 1% milk that is liquid, powdered, or evaporated. Buttermilk that is made with low-fat milk. Nonfat or low-fat yogurt.  Beverages  Mineral water. Diet carbonated beverages.  Sweets and Desserts  Sherbets and fruit ices. Honey, jam, marmalade, jelly, and syrups. Meringues and gelatins. Pure sugar candy, such as hard candy, jelly beans, gumdrops, mints, marshmallows, and small amounts of dark chocolate. Indra food cake.  Eat all sweets and desserts in moderation.  Fats and Oils  Nonhydrogenated (trans-free) margarines. Vegetable oils, including soybean, sesame, sunflower, olive, peanut, safflower, corn, canola, and cottonseed. Salad dressings or mayonnaise that are made with a vegetable oil. Limit added fats and oils that you use for cooking, baking, salads, and as spreads.  Other  Cocoa powder. Coffee and tea. All seasonings and condiments.  The items listed above may not be a complete list of recommended foods or beverages. Contact your dietitian for more options.  WHAT FOODS ARE NOT RECOMMENDED?  Grains  Breads that are made with saturated or trans fats, oils, or whole milk. Croissants. Butter rolls. Cheese breads. Sweet rolls. Donuts. Buttered popcorn. Chow mein noodles. High-fat crackers, such as cheese or butter crackers.  Meats and Other Protein  Sources  Fatty meats, such as hotdogs, short ribs, sausage, spareribs, bravo, ribeye roast or steak, and mutton. High-fat deli meats, such as salami and bologna. Caviar. Domestic duck and goose. Organ meats, such as kidney, liver, sweetbreads, brains, gizzard, chitterlings, and heart.  Dairy  Cream, sour cream, cream cheese, and creamed cottage cheese. Whole milk cheeses, including blue (jr), [place] Albino, Brie, Alonzo, American, Havarti, Swiss, cheddar, Camembert, and [place].  Whole or 2% milk that is liquid, evaporated, or condensed. Whole buttermilk. Cream sauce or high-fat cheese sauce. Yogurt that is made from whole milk.  Beverages  Regular sodas and drinks with added sugar.  Sweets and Desserts  Frosting. Pudding. Cookies. Cakes other than janett food cake. Candy that has milk chocolate or white chocolate, hydrogenated fat, butter, coconut, or unknown ingredients. Buttered syrups. Full-fat ice cream or ice cream drinks.  Fats and Oils  Gravy that has suet, meat fat, or shortening. Cocoa butter, hydrogenated oils, palm oil, coconut oil, palm kernel oil. These can often be found in baked products, candy, fried foods, nondairy creamers, and whipped toppings. Solid fats and shortenings, including bravo fat, salt pork, lard, and butter. Nondairy cream substitutes, such as coffee creamers and sour cream substitutes. Salad dressings that are made of unknown oils, cheese, or sour cream.  The items listed above may not be a complete list of foods and beverages to avoid. Contact your dietitian for more information.     This information is not intended to replace advice given to you by your health care provider. Make sure you discuss any questions you have with your health care provider.     Document Released: 09/26/2009 Document Revised: 01/08/2016 Document Reviewed: 06/11/2015  [employer] Patient Education ©2017 [employer]

## 2017-09-19 ENCOUNTER — TELEPHONE (OUTPATIENT)
Dept: INTERNAL MEDICINE | Facility: CLINIC | Age: 57
End: 2017-09-19

## 2017-09-19 NOTE — TELEPHONE ENCOUNTER
----- Message from Bryanna PEREA MD sent at 9/15/2017  5:48 PM EDT -----  Please call the patient regarding her abnormal result.  LDL cholesterol is staying high.  Need to consider statins.

## 2017-09-21 ENCOUNTER — TELEPHONE (OUTPATIENT)
Dept: INTERNAL MEDICINE | Facility: CLINIC | Age: 57
End: 2017-09-21

## 2017-09-22 ENCOUNTER — TELEPHONE (OUTPATIENT)
Dept: INTERNAL MEDICINE | Facility: CLINIC | Age: 57
End: 2017-09-22

## 2017-09-22 NOTE — TELEPHONE ENCOUNTER
Left message that we have some lab results. Third attempt. Letter with results has already been sent.

## 2017-11-20 ENCOUNTER — TELEPHONE (OUTPATIENT)
Dept: INTERNAL MEDICINE | Facility: CLINIC | Age: 57
End: 2017-11-20

## 2017-12-06 ENCOUNTER — FLU SHOT (OUTPATIENT)
Dept: INTERNAL MEDICINE | Facility: CLINIC | Age: 57
End: 2017-12-06

## 2017-12-06 DIAGNOSIS — Z23 NEED FOR INFLUENZA VACCINE: ICD-10-CM

## 2017-12-06 PROCEDURE — 90686 IIV4 VACC NO PRSV 0.5 ML IM: CPT | Performed by: FAMILY MEDICINE

## 2017-12-06 PROCEDURE — 90471 IMMUNIZATION ADMIN: CPT | Performed by: FAMILY MEDICINE

## 2017-12-13 ENCOUNTER — TRANSCRIBE ORDERS (OUTPATIENT)
Dept: ADMINISTRATIVE | Facility: HOSPITAL | Age: 57
End: 2017-12-13

## 2017-12-13 DIAGNOSIS — Z12.31 VISIT FOR SCREENING MAMMOGRAM: Primary | ICD-10-CM

## 2017-12-15 ENCOUNTER — TELEPHONE (OUTPATIENT)
Dept: INTERNAL MEDICINE | Facility: CLINIC | Age: 57
End: 2017-12-15

## 2017-12-15 ENCOUNTER — OFFICE VISIT (OUTPATIENT)
Dept: INTERNAL MEDICINE | Facility: CLINIC | Age: 57
End: 2017-12-15

## 2017-12-15 VITALS
BODY MASS INDEX: 36.44 KG/M2 | TEMPERATURE: 97.7 F | OXYGEN SATURATION: 97 % | SYSTOLIC BLOOD PRESSURE: 114 MMHG | WEIGHT: 224.4 LBS | DIASTOLIC BLOOD PRESSURE: 72 MMHG | HEART RATE: 80 BPM

## 2017-12-15 DIAGNOSIS — E78.5 HYPERLIPIDEMIA, UNSPECIFIED HYPERLIPIDEMIA TYPE: ICD-10-CM

## 2017-12-15 DIAGNOSIS — E03.9 ACQUIRED HYPOTHYROIDISM: ICD-10-CM

## 2017-12-15 DIAGNOSIS — K21.9 GASTROESOPHAGEAL REFLUX DISEASE WITHOUT ESOPHAGITIS: Primary | ICD-10-CM

## 2017-12-15 DIAGNOSIS — F51.01 PRIMARY INSOMNIA: ICD-10-CM

## 2017-12-15 LAB
ALBUMIN SERPL-MCNC: 4.2 G/DL (ref 3.2–4.8)
ALBUMIN/GLOB SERPL: 1.6 G/DL (ref 1.5–2.5)
ALP SERPL-CCNC: 89 U/L (ref 25–100)
ALT SERPL W P-5'-P-CCNC: 17 U/L (ref 7–40)
ANION GAP SERPL CALCULATED.3IONS-SCNC: 10 MMOL/L (ref 3–11)
ARTICHOKE IGE QN: 159 MG/DL (ref 0–130)
AST SERPL-CCNC: 11 U/L (ref 0–33)
BILIRUB SERPL-MCNC: 0.4 MG/DL (ref 0.3–1.2)
BUN BLD-MCNC: 14 MG/DL (ref 9–23)
BUN/CREAT SERPL: 23.3 (ref 7–25)
CALCIUM SPEC-SCNC: 9.4 MG/DL (ref 8.7–10.4)
CHLORIDE SERPL-SCNC: 102 MMOL/L (ref 99–109)
CHOLEST SERPL-MCNC: 219 MG/DL (ref 0–200)
CO2 SERPL-SCNC: 28 MMOL/L (ref 20–31)
CREAT BLD-MCNC: 0.6 MG/DL (ref 0.6–1.3)
GFR SERPL CREATININE-BSD FRML MDRD: 104 ML/MIN/1.73
GLOBULIN UR ELPH-MCNC: 2.6 GM/DL
GLUCOSE BLD-MCNC: 92 MG/DL (ref 70–100)
HDLC SERPL-MCNC: 59 MG/DL (ref 40–60)
POTASSIUM BLD-SCNC: 4.2 MMOL/L (ref 3.5–5.5)
PROT SERPL-MCNC: 6.8 G/DL (ref 5.7–8.2)
SODIUM BLD-SCNC: 140 MMOL/L (ref 132–146)
T4 FREE SERPL-MCNC: 1.45 NG/DL (ref 0.89–1.76)
TRIGL SERPL-MCNC: 103 MG/DL (ref 0–150)
TSH SERPL DL<=0.05 MIU/L-ACNC: 0.84 MIU/ML (ref 0.35–5.35)

## 2017-12-15 PROCEDURE — 84443 ASSAY THYROID STIM HORMONE: CPT | Performed by: FAMILY MEDICINE

## 2017-12-15 PROCEDURE — 80053 COMPREHEN METABOLIC PANEL: CPT | Performed by: FAMILY MEDICINE

## 2017-12-15 PROCEDURE — 80061 LIPID PANEL: CPT | Performed by: FAMILY MEDICINE

## 2017-12-15 PROCEDURE — 84439 ASSAY OF FREE THYROXINE: CPT | Performed by: FAMILY MEDICINE

## 2017-12-15 PROCEDURE — 99214 OFFICE O/P EST MOD 30 MIN: CPT | Performed by: FAMILY MEDICINE

## 2017-12-15 RX ORDER — ZOLPIDEM TARTRATE 10 MG/1
10 TABLET ORAL NIGHTLY PRN
Qty: 30 TABLET | Refills: 2 | Status: SHIPPED | OUTPATIENT
Start: 2017-12-15 | End: 2018-03-06 | Stop reason: SDUPTHER

## 2017-12-15 RX ORDER — LEVOTHYROXINE SODIUM 0.15 MG/1
150 TABLET ORAL DAILY
Qty: 30 TABLET | Refills: 5 | Status: SHIPPED | OUTPATIENT
Start: 2017-12-15 | End: 2018-06-04 | Stop reason: SDUPTHER

## 2017-12-15 RX ORDER — FLUTICASONE PROPIONATE 50 MCG
2 SPRAY, SUSPENSION (ML) NASAL DAILY
Qty: 16 G | Refills: 5 | Status: SHIPPED | OUTPATIENT
Start: 2017-12-15 | End: 2018-06-04 | Stop reason: SDUPTHER

## 2017-12-15 RX ORDER — OMEPRAZOLE 20 MG/1
20 CAPSULE, DELAYED RELEASE ORAL DAILY
Qty: 30 CAPSULE | Refills: 5 | Status: SHIPPED | OUTPATIENT
Start: 2017-12-15 | End: 2018-06-04 | Stop reason: SDUPTHER

## 2017-12-15 RX ORDER — NAPROXEN 500 MG/1
500 TABLET ORAL NIGHTLY PRN
Qty: 30 TABLET | Refills: 5 | Status: SHIPPED | OUTPATIENT
Start: 2017-12-15 | End: 2019-03-05 | Stop reason: SDUPTHER

## 2017-12-15 NOTE — PROGRESS NOTES
Subjective   Ana Lincoln is a 57 y.o. female.     Chief Complaint   Patient presents with   • Med Management     3 month follow up       Visit Vitals   • /72   • Pulse 80   • Temp 97.7 °F (36.5 °C)   • Wt 102 kg (224 lb 6.4 oz)   • LMP  (LMP Unknown)  Comment: N/A   • SpO2 97%   • BMI 36.44 kg/m2       Hypothyroidism   This is a chronic problem. The current episode started more than 1 year ago. The problem occurs constantly. The problem has been unchanged. Associated symptoms include congestion and neck pain. Pertinent negatives include no abdominal pain, anorexia, arthralgias, change in bowel habit, chest pain, chills, coughing, diaphoresis, fatigue, fever, headaches, joint swelling, myalgias, nausea, numbness, rash, sore throat, swollen glands, urinary symptoms, vertigo, visual change, vomiting or weakness. Nothing aggravates the symptoms. Treatments tried: thyroid. The treatment provided significant relief.   Insomnia   This is a chronic problem. The current episode started more than 1 year ago. The problem occurs constantly. The problem has been unchanged. Associated symptoms include congestion and neck pain. Pertinent negatives include no abdominal pain, anorexia, arthralgias, change in bowel habit, chest pain, chills, coughing, diaphoresis, fatigue, fever, headaches, joint swelling, myalgias, nausea, numbness, rash, sore throat, swollen glands, urinary symptoms, vertigo, visual change, vomiting or weakness. Nothing aggravates the symptoms. Treatments tried: ambien. The treatment provided significant relief.      Pt is eating better as daughter is working on weight loss.   GERD stable on omeprazole    The following portions of the patient's history were reviewed and updated as appropriate: allergies, current medications, past family history, past medical history, past social history, past surgical history and problem list.    Past Medical History:   Diagnosis Date   • Anxiety    • Breast cyst  01/25/2017    right on sono   • Calculus of kidney    • Carbuncle and furuncle of buttock 08/16/2012   • Chalazion    • GERD (gastroesophageal reflux disease)    • Goiter    • Hyperlipidemia    • Hypothyroid    • Insomnia    • Persistent disorder of initiating or maintaining sleep    • Routine general medical examination at a health care facility 02/19/2013   • Routine general medical examination at a health care facility 02/16/2012   • Sacroiliac pain    • Sicca syndrome    • Sleep apnea    • Visit for routine gyn exam 02/16/2012       Past Surgical History:   Procedure Laterality Date   • REPLACEMENT TOTAL KNEE BILATERAL     • TOTAL ABDOMINAL HYSTERECTOMY WITH SALPINGO OOPHORECTOMY  2002       Allergies   Allergen Reactions   • Erythromycin Nausea Only   • Morphine And Related    • Requip [Ropinirole Hcl] Nausea Only       Family History   Problem Relation Age of Onset   • Thyroid disease Mother    • Memory loss Mother    • Alzheimer's disease Mother    • Other Mother      BCCA; Blood Clot   • Heart disease Father    • Atrial fibrillation Father    • Cirrhosis Father    • Depression Father    • Other Father      SCCA   • Alzheimer's disease Maternal Aunt    • Breast cancer Maternal Grandmother 80   • Ovarian cancer Neg Hx        Social History     Social History   • Marital status:      Spouse name: N/A   • Number of children: N/A   • Years of education: N/A     Occupational History   • Not on file.     Social History Main Topics   • Smoking status: Former Smoker   • Smokeless tobacco: Never Used      Comment: quit 30 + years   • Alcohol use 3.0 oz/week     5 Glasses of wine per week      Comment: red wine   • Drug use: No   • Sexual activity: Yes     Birth control/ protection: None     Other Topics Concern   • Not on file     Social History Narrative       Review of Systems   Constitutional: Negative.  Negative for chills, diaphoresis, fatigue and fever.   HENT: Positive for congestion. Negative for ear  pain, nosebleeds, postnasal drip, rhinorrhea, sinus pressure, sneezing and sore throat.    Eyes: Negative.  Negative for redness and itching.   Respiratory: Negative.  Negative for cough, shortness of breath and wheezing.    Cardiovascular: Negative.  Negative for chest pain and palpitations.   Gastrointestinal: Negative.  Negative for abdominal pain, anorexia, blood in stool, change in bowel habit, constipation, diarrhea, nausea and vomiting.   Endocrine: Negative.  Negative for cold intolerance, heat intolerance, polydipsia and polyphagia.   Genitourinary: Negative.  Negative for dysuria, frequency, hematuria and urgency.   Musculoskeletal: Positive for neck pain. Negative for arthralgias, back pain, joint swelling and myalgias.        Pt seeing Chiropractor for neck pain.    Skin: Negative.  Negative for color change and rash.   Allergic/Immunologic: Positive for environmental allergies.   Neurological: Negative.  Negative for dizziness, vertigo, syncope, weakness, light-headedness, numbness and headaches.   Hematological: Negative.  Negative for adenopathy. Does not bruise/bleed easily.   Psychiatric/Behavioral: Positive for sleep disturbance. Negative for dysphoric mood. The patient has insomnia. The patient is not nervous/anxious.        Objective   Physical Exam   Constitutional: She is oriented to person, place, and time. She appears well-developed.   HENT:   Head: Normocephalic.   Right Ear: Tympanic membrane, external ear and ear canal normal.   Left Ear: Tympanic membrane, external ear and ear canal normal.   Nose: Nose normal.   Mouth/Throat: Uvula is midline, oropharynx is clear and moist and mucous membranes are normal. No oropharyngeal exudate, posterior oropharyngeal edema or posterior oropharyngeal erythema.   Eyes: Conjunctivae and EOM are normal. Pupils are equal, round, and reactive to light.   Neck: Trachea normal and normal range of motion. Neck supple. Carotid bruit is not present. No thyroid  mass and no thyromegaly present.   Cardiovascular: Normal rate and regular rhythm.    No murmur heard.  Pulmonary/Chest: Effort normal and breath sounds normal. No respiratory distress. She has no decreased breath sounds. She has no wheezes. She has no rhonchi. She has no rales. She exhibits no tenderness.   Abdominal: Soft. Bowel sounds are normal. There is no tenderness.   Musculoskeletal: Normal range of motion.   Neurological: She is alert and oriented to person, place, and time.   Skin: Skin is warm and dry.   Psychiatric: She has a normal mood and affect. Her behavior is normal.   Nursing note and vitals reviewed.      Assessment/Plan   Ana was seen today for med management.    Diagnoses and all orders for this visit:    Gastroesophageal reflux disease without esophagitis    Primary insomnia  -     zolpidem (AMBIEN) 10 MG tablet; Take 1 tablet by mouth At Night As Needed for Sleep.    Acquired hypothyroidism  -     levothyroxine (SYNTHROID, LEVOTHROID) 150 MCG tablet; Take 1 tablet by mouth Daily.  -     TSH  -     T4, Free    Hyperlipidemia, unspecified hyperlipidemia type  -     Comprehensive Metabolic Panel  -     Lipid Panel    Other orders  -     naproxen (NAPROSYN) 500 MG tablet; Take 1 tablet by mouth At Night As Needed for Mild Pain .  -     omeprazole (priLOSEC) 20 MG capsule; Take 1 capsule by mouth Daily.  -     fluticasone (FLONASE) 50 MCG/ACT nasal spray; 2 sprays into each nostril Daily. Administer 2 sprays in each nostril for each dose.                   Current Outpatient Prescriptions:   •  cetirizine (ZyrTEC) 10 MG tablet, Take 10 mg by mouth daily., Disp: , Rfl:   •  fluticasone (FLONASE) 50 MCG/ACT nasal spray, 2 sprays into each nostril Daily. Administer 2 sprays in each nostril for each dose., Disp: 16 g, Rfl: 5  •  levothyroxine (SYNTHROID, LEVOTHROID) 150 MCG tablet, Take 1 tablet by mouth Daily., Disp: 30 tablet, Rfl: 5  •  naproxen (NAPROSYN) 500 MG tablet, Take 1 tablet by mouth  At Night As Needed for Mild Pain ., Disp: 30 tablet, Rfl: 5  •  omeprazole (priLOSEC) 20 MG capsule, Take 1 capsule by mouth Daily., Disp: 30 capsule, Rfl: 5  •  zolpidem (AMBIEN) 10 MG tablet, Take 1 tablet by mouth At Night As Needed for Sleep., Disp: 30 tablet, Rfl: 2    Return in about 3 months (around 3/15/2018) for Recheck.    Recent Results (from the past 168 hour(s))   Comprehensive Metabolic Panel    Collection Time: 12/15/17  9:48 AM   Result Value Ref Range    Glucose 92 70 - 100 mg/dL    BUN 14 9 - 23 mg/dL    Creatinine 0.60 0.60 - 1.30 mg/dL    Sodium 140 132 - 146 mmol/L    Potassium 4.2 3.5 - 5.5 mmol/L    Chloride 102 99 - 109 mmol/L    CO2 28.0 20.0 - 31.0 mmol/L    Calcium 9.4 8.7 - 10.4 mg/dL    Total Protein 6.8 5.7 - 8.2 g/dL    Albumin 4.20 3.20 - 4.80 g/dL    ALT (SGPT) 17 7 - 40 U/L    AST (SGOT) 11 0 - 33 U/L    Alkaline Phosphatase 89 25 - 100 U/L    Total Bilirubin 0.4 0.3 - 1.2 mg/dL    eGFR Non African Amer 104 >60 mL/min/1.73    Globulin 2.6 gm/dL    A/G Ratio 1.6 1.5 - 2.5 g/dL    BUN/Creatinine Ratio 23.3 7.0 - 25.0    Anion Gap 10.0 3.0 - 11.0 mmol/L   TSH    Collection Time: 12/15/17  9:48 AM   Result Value Ref Range    TSH 0.838 0.350 - 5.350 mIU/mL   T4, Free    Collection Time: 12/15/17  9:48 AM   Result Value Ref Range    Free T4 1.45 0.89 - 1.76 ng/dL   Lipid Panel    Collection Time: 12/15/17  9:48 AM   Result Value Ref Range    Total Cholesterol 219 (H) 0 - 200 mg/dL    Triglycerides 103 0 - 150 mg/dL    HDL Cholesterol 59 40 - 60 mg/dL    LDL Cholesterol  159 (H) 0 - 130 mg/dL

## 2017-12-18 ENCOUNTER — TELEPHONE (OUTPATIENT)
Dept: INTERNAL MEDICINE | Facility: CLINIC | Age: 57
End: 2017-12-18

## 2017-12-18 NOTE — TELEPHONE ENCOUNTER
----- Message from Bryanna PEREA MD sent at 12/15/2017  6:21 PM EST -----  Please call the patient regarding her abnormal result.  Metabolic panel and thyroid were good.  We should consider statins since the LDL is staying high. Please follow a low animal fat diet that is also low in sugar, low in junk food, low in sweet drinks and low in alcohol.  Please increase the amount of fiber in diet as well as increasing your daily exercise, such as walking.

## 2017-12-19 ENCOUNTER — TELEPHONE (OUTPATIENT)
Dept: INTERNAL MEDICINE | Facility: CLINIC | Age: 57
End: 2017-12-19

## 2017-12-19 NOTE — TELEPHONE ENCOUNTER
Patient's  answered the phone. He's going to have her check her labs online and they will call with any questions and/or if she'd like to take a medication recommendation.

## 2017-12-20 ENCOUNTER — TELEPHONE (OUTPATIENT)
Dept: INTERNAL MEDICINE | Facility: CLINIC | Age: 57
End: 2017-12-20

## 2017-12-20 NOTE — TELEPHONE ENCOUNTER
Patient called about lab results. She'd accessed them online and was not sure if she wanted to start the statins or not. We discussed her options. She will call after Jan to let us know if she wants to start them or not.

## 2018-01-18 ENCOUNTER — HOSPITAL ENCOUNTER (OUTPATIENT)
Dept: MAMMOGRAPHY | Facility: HOSPITAL | Age: 58
Discharge: HOME | End: 2018-01-18
Admitting: FAMILY MEDICINE

## 2018-01-18 DIAGNOSIS — Z12.31 VISIT FOR SCREENING MAMMOGRAM: ICD-10-CM

## 2018-01-18 PROCEDURE — 77067 SCR MAMMO BI INCL CAD: CPT | Performed by: RADIOLOGY

## 2018-01-18 PROCEDURE — 77063 BREAST TOMOSYNTHESIS BI: CPT

## 2018-01-18 PROCEDURE — 77063 BREAST TOMOSYNTHESIS BI: CPT | Performed by: RADIOLOGY

## 2018-01-18 PROCEDURE — 77067 SCR MAMMO BI INCL CAD: CPT

## 2018-03-06 ENCOUNTER — OFFICE VISIT (OUTPATIENT)
Dept: INTERNAL MEDICINE | Facility: CLINIC | Age: 58
End: 2018-03-06

## 2018-03-06 VITALS
BODY MASS INDEX: 36.64 KG/M2 | TEMPERATURE: 97.6 F | WEIGHT: 228 LBS | DIASTOLIC BLOOD PRESSURE: 78 MMHG | OXYGEN SATURATION: 98 % | SYSTOLIC BLOOD PRESSURE: 132 MMHG | HEIGHT: 66 IN | HEART RATE: 76 BPM

## 2018-03-06 DIAGNOSIS — Z78.0 MENOPAUSE: ICD-10-CM

## 2018-03-06 DIAGNOSIS — F51.01 PRIMARY INSOMNIA: ICD-10-CM

## 2018-03-06 DIAGNOSIS — Z00.00 ANNUAL PHYSICAL EXAM: Primary | ICD-10-CM

## 2018-03-06 DIAGNOSIS — E78.5 HYPERLIPIDEMIA, UNSPECIFIED HYPERLIPIDEMIA TYPE: ICD-10-CM

## 2018-03-06 LAB
ALBUMIN SERPL-MCNC: 4.4 G/DL (ref 3.2–4.8)
ALBUMIN/GLOB SERPL: 1.6 G/DL (ref 1.5–2.5)
ALP SERPL-CCNC: 83 U/L (ref 25–100)
ALT SERPL W P-5'-P-CCNC: 21 U/L (ref 7–40)
ANION GAP SERPL CALCULATED.3IONS-SCNC: 6 MMOL/L (ref 3–11)
ARTICHOKE IGE QN: 155 MG/DL (ref 0–130)
AST SERPL-CCNC: 16 U/L (ref 0–33)
BILIRUB BLD-MCNC: NEGATIVE MG/DL
BILIRUB SERPL-MCNC: 0.5 MG/DL (ref 0.3–1.2)
BUN BLD-MCNC: 14 MG/DL (ref 9–23)
BUN/CREAT SERPL: 23.3 (ref 7–25)
CALCIUM SPEC-SCNC: 9.1 MG/DL (ref 8.7–10.4)
CHLORIDE SERPL-SCNC: 105 MMOL/L (ref 99–109)
CHOLEST SERPL-MCNC: 218 MG/DL (ref 0–200)
CLARITY, POC: CLEAR
CO2 SERPL-SCNC: 29 MMOL/L (ref 20–31)
COLOR UR: YELLOW
CREAT BLD-MCNC: 0.6 MG/DL (ref 0.6–1.3)
DEVELOPER EXPIRATION DATE: NORMAL
DEVELOPER LOT NUMBER: NORMAL
EXPIRATION DATE: NORMAL
FECAL OCCULT BLOOD SCREEN, POC: NEGATIVE
GFR SERPL CREATININE-BSD FRML MDRD: 104 ML/MIN/1.73
GLOBULIN UR ELPH-MCNC: 2.7 GM/DL
GLUCOSE BLD-MCNC: 94 MG/DL (ref 70–100)
GLUCOSE UR STRIP-MCNC: NEGATIVE MG/DL
HDLC SERPL-MCNC: 59 MG/DL (ref 40–60)
KETONES UR QL: NEGATIVE
LEUKOCYTE EST, POC: NEGATIVE
Lab: NORMAL
NEGATIVE CONTROL: NEGATIVE
NITRITE UR-MCNC: NEGATIVE MG/ML
PH UR: 6.5 [PH] (ref 5–8)
POSITIVE CONTROL: POSITIVE
POTASSIUM BLD-SCNC: 4.2 MMOL/L (ref 3.5–5.5)
PROT SERPL-MCNC: 7.1 G/DL (ref 5.7–8.2)
PROT UR STRIP-MCNC: NEGATIVE MG/DL
RBC # UR STRIP: NEGATIVE /UL
SODIUM BLD-SCNC: 140 MMOL/L (ref 132–146)
SP GR UR: 1.01 (ref 1–1.03)
TRIGL SERPL-MCNC: 130 MG/DL (ref 0–150)
UROBILINOGEN UR QL: NORMAL

## 2018-03-06 PROCEDURE — 80061 LIPID PANEL: CPT | Performed by: FAMILY MEDICINE

## 2018-03-06 PROCEDURE — 82270 OCCULT BLOOD FECES: CPT | Performed by: FAMILY MEDICINE

## 2018-03-06 PROCEDURE — 81003 URINALYSIS AUTO W/O SCOPE: CPT | Performed by: FAMILY MEDICINE

## 2018-03-06 PROCEDURE — 80053 COMPREHEN METABOLIC PANEL: CPT | Performed by: FAMILY MEDICINE

## 2018-03-06 PROCEDURE — 99396 PREV VISIT EST AGE 40-64: CPT | Performed by: FAMILY MEDICINE

## 2018-03-06 RX ORDER — ZOLPIDEM TARTRATE 10 MG/1
10 TABLET ORAL NIGHTLY PRN
Qty: 30 TABLET | Refills: 2 | Status: SHIPPED | OUTPATIENT
Start: 2018-03-06 | End: 2018-06-04 | Stop reason: SDUPTHER

## 2018-03-06 NOTE — PROGRESS NOTES
"Subjective   Ana Lincoln is a 57 y.o. female.     Chief Complaint   Patient presents with   • Annual Exam       Visit Vitals   • /78   • Pulse 76   • Temp 97.6 °F (36.4 °C)   • Ht 167 cm (65.75\")   • Wt 103 kg (228 lb)   • LMP  (LMP Unknown)  Comment: N/A   • SpO2 98%   • BMI 37.08 kg/m2       Insomnia   This is a chronic problem. The current episode started more than 1 year ago. The problem occurs constantly. The problem has been unchanged. Associated symptoms include abdominal pain and arthralgias. Pertinent negatives include no anorexia, change in bowel habit, chest pain, chills, congestion, coughing, diaphoresis, fatigue, fever, headaches, joint swelling, myalgias, nausea, neck pain, numbness, rash, sore throat, swollen glands, urinary symptoms, vertigo, visual change, vomiting or weakness. The symptoms are aggravated by stress. Treatments tried: ambien. The treatment provided significant relief.      Pt will be seeing Dr Ernandez soon to recheck lip area.   The following portions of the patient's history were reviewed and updated as appropriate: allergies, current medications, past family history, past medical history, past social history, past surgical history and problem list.    Past Medical History:   Diagnosis Date   • Anxiety    • Breast cyst 01/25/2017    right on sono   • Calculus of kidney    • Carbuncle and furuncle of buttock 08/16/2012   • Chalazion    • GERD (gastroesophageal reflux disease)    • Goiter    • Hyperlipidemia    • Hypothyroid    • Insomnia    • Persistent disorder of initiating or maintaining sleep    • Routine general medical examination at a health care facility 02/19/2013   • Routine general medical examination at a health care facility 02/16/2012   • Sacroiliac pain    • Sicca syndrome    • Sleep apnea    • Visit for routine gyn exam 02/16/2012     Past Surgical History:   Procedure Laterality Date   • REPLACEMENT TOTAL KNEE BILATERAL     • TOTAL ABDOMINAL HYSTERECTOMY " WITH SALPINGO OOPHORECTOMY  2002       Allergies   Allergen Reactions   • Erythromycin Nausea Only   • Morphine And Related Swelling     Tongue swells, doesn't help pain   • Requip [Ropinirole Hcl] Nausea Only     Family History   Problem Relation Age of Onset   • Thyroid disease Mother    • Memory loss Mother    • Alzheimer's disease Mother    • Other Mother      BCCA; Blood Clot   • Heart disease Father    • Atrial fibrillation Father    • Cirrhosis Father    • Depression Father    • Other Father      SCCA   • Alzheimer's disease Maternal Aunt    • Breast cancer Maternal Grandmother 80   • Ovarian cancer Neg Hx    • BRCA 1/2 Neg Hx      Social History     Social History   • Marital status:      Spouse name: N/A   • Number of children: N/A   • Years of education: N/A     Occupational History   • Not on file.     Social History Main Topics   • Smoking status: Former Smoker   • Smokeless tobacco: Never Used      Comment: quit 30 + years   • Alcohol use 3.0 oz/week     5 Glasses of wine per week      Comment: red wine   • Drug use: No   • Sexual activity: Yes     Partners: Male     Birth control/ protection: Post-menopausal, Surgical     Other Topics Concern   • Not on file     Social History Narrative   • No narrative on file       Review of Systems   Constitutional: Negative.  Negative for activity change, appetite change, chills, diaphoresis, fatigue, fever and unexpected weight change.   HENT: Positive for postnasal drip and rhinorrhea. Negative for congestion, dental problem, drooling, ear discharge, ear pain, facial swelling, hearing loss, mouth sores, nosebleeds, sinus pain, sinus pressure, sneezing, sore throat, tinnitus, trouble swallowing and voice change.    Eyes: Negative.  Negative for photophobia, pain, discharge, redness, itching and visual disturbance.   Respiratory: Negative.  Negative for apnea, cough, choking, chest tightness, shortness of breath, wheezing and stridor.    Cardiovascular:  Negative.  Negative for chest pain, palpitations and leg swelling.   Gastrointestinal: Positive for abdominal pain. Negative for abdominal distention, anal bleeding, anorexia, blood in stool, change in bowel habit, constipation, diarrhea, nausea, rectal pain and vomiting.        Umbilical pain one time.   Endocrine: Negative.  Negative for cold intolerance, heat intolerance, polydipsia, polyphagia and polyuria.   Genitourinary: Positive for dyspareunia. Negative for decreased urine volume, difficulty urinating, dysuria, enuresis, flank pain, frequency, genital sores, hematuria, menstrual problem, pelvic pain, urgency, vaginal bleeding, vaginal discharge and vaginal pain.   Musculoskeletal: Positive for arthralgias. Negative for back pain, gait problem, joint swelling, myalgias, neck pain and neck stiffness.   Skin: Negative.  Negative for color change, pallor, rash and wound.   Allergic/Immunologic: Positive for environmental allergies. Negative for food allergies and immunocompromised state.   Neurological: Negative.  Negative for dizziness, vertigo, tremors, seizures, syncope, facial asymmetry, speech difficulty, weakness, light-headedness, numbness and headaches.   Hematological: Negative.  Negative for adenopathy. Does not bruise/bleed easily.   Psychiatric/Behavioral: Negative for agitation, behavioral problems, confusion, decreased concentration, dysphoric mood, hallucinations, self-injury, sleep disturbance and suicidal ideas. The patient has insomnia. The patient is not nervous/anxious and is not hyperactive.        Objective   Physical Exam   Constitutional: She is oriented to person, place, and time. She appears well-developed and well-nourished. No distress.   HENT:   Head: Normocephalic and atraumatic.   Right Ear: Tympanic membrane, external ear and ear canal normal.   Left Ear: Tympanic membrane, external ear and ear canal normal.   Nose: Nose normal.   Mouth/Throat: Uvula is midline, oropharynx is  clear and moist and mucous membranes are normal. Normal dentition. No oropharyngeal exudate, posterior oropharyngeal edema or posterior oropharyngeal erythema.   Eyes: Conjunctivae, EOM and lids are normal. Pupils are equal, round, and reactive to light. Right eye exhibits no chemosis, no discharge, no exudate and no hordeolum. No foreign body present in the right eye. Left eye exhibits no chemosis, no discharge, no exudate and no hordeolum. No foreign body present in the left eye. Right conjunctiva is not injected. Right conjunctiva has no hemorrhage. Left conjunctiva is not injected. Left conjunctiva has no hemorrhage. No scleral icterus. Right eye exhibits normal extraocular motion and no nystagmus. Left eye exhibits normal extraocular motion and no nystagmus.   Fundoscopic exam:       The right eye shows red reflex.        The left eye shows red reflex.   Neck: Trachea normal and normal range of motion. Neck supple. Carotid bruit is not present. No tracheal deviation present. No thyroid mass and no thyromegaly present.   Cardiovascular: Normal rate, regular rhythm, normal heart sounds and intact distal pulses.  Exam reveals no gallop and no friction rub.    No murmur heard.  Pulses:       Dorsalis pedis pulses are 2+ on the right side, and 2+ on the left side.        Posterior tibial pulses are 2+ on the right side, and 2+ on the left side.   Pulmonary/Chest: Effort normal and breath sounds normal. No respiratory distress. She has no decreased breath sounds. She has no wheezes. She has no rhonchi. She has no rales. Chest wall is not dull to percussion. She exhibits no tenderness. Right breast exhibits no inverted nipple, no mass, no nipple discharge, no skin change and no tenderness. Left breast exhibits no inverted nipple, no mass, no nipple discharge, no skin change and no tenderness.   Abdominal: Soft. Bowel sounds are normal. She exhibits no distension and no mass. There is no hepatosplenomegaly. There is no  tenderness. There is no rebound and no guarding.   Genitourinary: Rectum normal. Rectal exam shows no external hemorrhoid, no internal hemorrhoid, no fissure, no mass, no tenderness, anal tone normal and guaiac negative stool.   Musculoskeletal: Normal range of motion. She exhibits no edema, tenderness or deformity.       Vascular Status -  Her exam exhibits right foot vasculature normal. Her exam exhibits no right foot edema. Her exam exhibits left foot vasculature normal. Her exam exhibits no left foot edema.  Lymphadenopathy:        Head (right side): No submandibular adenopathy present.        Head (left side): No submandibular adenopathy present.     She has no cervical adenopathy.        Right cervical: No superficial cervical, no deep cervical and no posterior cervical adenopathy present.       Left cervical: No superficial cervical, no deep cervical and no posterior cervical adenopathy present.     She has no axillary adenopathy.        Right axillary: No pectoral and no lateral adenopathy present.        Left axillary: No pectoral and no lateral adenopathy present.       Right: No inguinal and no supraclavicular adenopathy present.        Left: No inguinal and no supraclavicular adenopathy present.   Neurological: She is alert and oriented to person, place, and time. She has normal reflexes. No cranial nerve deficit or sensory deficit. Coordination normal.   Reflex Scores:       Bicep reflexes are 2+ on the right side and 2+ on the left side.       Brachioradialis reflexes are 2+ on the right side and 2+ on the left side.       Patellar reflexes are 2+ on the right side and 2+ on the left side.  Skin: Skin is warm and dry. No rash noted. She is not diaphoretic. No cyanosis. Nails show no clubbing.   Psychiatric: She has a normal mood and affect. Her speech is normal and behavior is normal. Judgment and thought content normal. Cognition and memory are normal.   Nursing note and vitals  reviewed.      Assessment/Plan   Ana was seen today for annual exam.    Diagnoses and all orders for this visit:    Annual physical exam  -     POCT urinalysis dipstick, automated  -     POC Occult Blood Stool    Primary insomnia  -     zolpidem (AMBIEN) 10 MG tablet; Take 1 tablet by mouth At Night As Needed for Sleep.    Hyperlipidemia, unspecified hyperlipidemia type  -     Comprehensive Metabolic Panel  -     Lipid Panel    Menopause  -     DEXA Bone Density Axial; Future        Please follow a low animal fat diet that is also low in sugar, low in junk food, low in sweet drinks and low in alcohol.  Please increase the amount of fiber in your diet as well as increasing your daily exercise, such as walking.    Pt is up to date on mammogram and colonoscopy         Current Outpatient Prescriptions:   •  cetirizine (ZyrTEC) 10 MG tablet, Take 10 mg by mouth daily., Disp: , Rfl:   •  fluticasone (FLONASE) 50 MCG/ACT nasal spray, 2 sprays into each nostril Daily. Administer 2 sprays in each nostril for each dose., Disp: 16 g, Rfl: 5  •  levothyroxine (SYNTHROID, LEVOTHROID) 150 MCG tablet, Take 1 tablet by mouth Daily., Disp: 30 tablet, Rfl: 5  •  naproxen (NAPROSYN) 500 MG tablet, Take 1 tablet by mouth At Night As Needed for Mild Pain ., Disp: 30 tablet, Rfl: 5  •  omeprazole (priLOSEC) 20 MG capsule, Take 1 capsule by mouth Daily., Disp: 30 capsule, Rfl: 5  •  zolpidem (AMBIEN) 10 MG tablet, Take 1 tablet by mouth At Night As Needed for Sleep., Disp: 30 tablet, Rfl: 2    No Follow-up on file.    Recent Results (from the past 2352 hour(s))   Comprehensive Metabolic Panel    Collection Time: 12/15/17  9:48 AM   Result Value Ref Range    Glucose 92 70 - 100 mg/dL    BUN 14 9 - 23 mg/dL    Creatinine 0.60 0.60 - 1.30 mg/dL    Sodium 140 132 - 146 mmol/L    Potassium 4.2 3.5 - 5.5 mmol/L    Chloride 102 99 - 109 mmol/L    CO2 28.0 20.0 - 31.0 mmol/L    Calcium 9.4 8.7 - 10.4 mg/dL    Total Protein 6.8 5.7 - 8.2 g/dL     Albumin 4.20 3.20 - 4.80 g/dL    ALT (SGPT) 17 7 - 40 U/L    AST (SGOT) 11 0 - 33 U/L    Alkaline Phosphatase 89 25 - 100 U/L    Total Bilirubin 0.4 0.3 - 1.2 mg/dL    eGFR Non African Amer 104 >60 mL/min/1.73    Globulin 2.6 gm/dL    A/G Ratio 1.6 1.5 - 2.5 g/dL    BUN/Creatinine Ratio 23.3 7.0 - 25.0    Anion Gap 10.0 3.0 - 11.0 mmol/L   TSH    Collection Time: 12/15/17  9:48 AM   Result Value Ref Range    TSH 0.838 0.350 - 5.350 mIU/mL   T4, Free    Collection Time: 12/15/17  9:48 AM   Result Value Ref Range    Free T4 1.45 0.89 - 1.76 ng/dL   Lipid Panel    Collection Time: 12/15/17  9:48 AM   Result Value Ref Range    Total Cholesterol 219 (H) 0 - 200 mg/dL    Triglycerides 103 0 - 150 mg/dL    HDL Cholesterol 59 40 - 60 mg/dL    LDL Cholesterol  159 (H) 0 - 130 mg/dL   POCT urinalysis dipstick, automated    Collection Time: 03/06/18  9:19 AM   Result Value Ref Range    Color Yellow Yellow, Straw, Dark Yellow, Dipika    Clarity, UA Clear Clear    Glucose, UA Negative Negative, 1000 mg/dL (3+) mg/dL    Bilirubin Negative Negative    Ketones, UA Negative Negative    Specific Gravity  1.015 1.005 - 1.030    Blood, UA Negative Negative    pH, Urine 6.5 5.0 - 8.0    Protein, POC Negative Negative mg/dL    Urobilinogen, UA Normal Normal    Leukocytes Negative Negative    Nitrite, UA Negative Negative   Comprehensive Metabolic Panel    Collection Time: 03/06/18  9:52 AM   Result Value Ref Range    Glucose 94 70 - 100 mg/dL    BUN 14 9 - 23 mg/dL    Creatinine 0.60 0.60 - 1.30 mg/dL    Sodium 140 132 - 146 mmol/L    Potassium 4.2 3.5 - 5.5 mmol/L    Chloride 105 99 - 109 mmol/L    CO2 29.0 20.0 - 31.0 mmol/L    Calcium 9.1 8.7 - 10.4 mg/dL    Total Protein 7.1 5.7 - 8.2 g/dL    Albumin 4.40 3.20 - 4.80 g/dL    ALT (SGPT) 21 7 - 40 U/L    AST (SGOT) 16 0 - 33 U/L    Alkaline Phosphatase 83 25 - 100 U/L    Total Bilirubin 0.5 0.3 - 1.2 mg/dL    eGFR Non African Amer 104 >60 mL/min/1.73    Globulin 2.7 gm/dL    A/G  Ratio 1.6 1.5 - 2.5 g/dL    BUN/Creatinine Ratio 23.3 7.0 - 25.0    Anion Gap 6.0 3.0 - 11.0 mmol/L   Lipid Panel    Collection Time: 03/06/18  9:52 AM   Result Value Ref Range    Total Cholesterol 218 (H) 0 - 200 mg/dL    Triglycerides 130 0 - 150 mg/dL    HDL Cholesterol 59 40 - 60 mg/dL    LDL Cholesterol  155 (H) 0 - 130 mg/dL   POC Occult Blood Stool    Collection Time: 03/06/18  2:04 PM   Result Value Ref Range    Fecal Occult Blood Negative Negative    Lot Number [id number]     Expiration Date 6/30/2020     DEVELOPER LOT NUMBER [id number]     DEVELOPER EXPIRATION DATE 11/30/19     Positive Control Positive Positive    Negative Control Negative Negative     Patient Care Team:  Bryanna PEREA MD as PCP - General (Family Medicine)  Bryanna PEREA MD as PCP - Family Medicine  MD Stacia Miller MD as Consulting Physician (Dermatology)

## 2018-03-07 ENCOUNTER — TELEPHONE (OUTPATIENT)
Dept: INTERNAL MEDICINE | Facility: CLINIC | Age: 58
End: 2018-03-07

## 2018-03-07 DIAGNOSIS — E78.00 PURE HYPERCHOLESTEROLEMIA: Primary | ICD-10-CM

## 2018-03-07 RX ORDER — PRAVASTATIN SODIUM 20 MG
20 TABLET ORAL NIGHTLY
Qty: 30 TABLET | Refills: 3 | Status: SHIPPED | OUTPATIENT
Start: 2018-03-07 | End: 2018-06-04 | Stop reason: SDUPTHER

## 2018-03-07 NOTE — TELEPHONE ENCOUNTER
----- Message from Bryanna PEREA MD sent at 3/7/2018  2:56 PM EST -----  Please call the patient regarding her abnormal result. LDL bad cholesterol is staying high, we should consider statins

## 2018-03-07 NOTE — TELEPHONE ENCOUNTER
Contacted patient regarding results. [phone]. She is willing to try statins if we can call them in.

## 2018-03-28 ENCOUNTER — HOSPITAL ENCOUNTER (OUTPATIENT)
Dept: BONE DENSITY | Facility: HOSPITAL | Age: 58
Discharge: HOME | End: 2018-03-28
Admitting: FAMILY MEDICINE

## 2018-03-28 DIAGNOSIS — Z78.0 MENOPAUSE: ICD-10-CM

## 2018-03-28 PROCEDURE — 77080 DXA BONE DENSITY AXIAL: CPT

## 2018-06-04 ENCOUNTER — OFFICE VISIT (OUTPATIENT)
Dept: INTERNAL MEDICINE | Facility: CLINIC | Age: 58
End: 2018-06-04

## 2018-06-04 VITALS
BODY MASS INDEX: 37.22 KG/M2 | SYSTOLIC BLOOD PRESSURE: 132 MMHG | WEIGHT: 231.6 LBS | DIASTOLIC BLOOD PRESSURE: 84 MMHG | HEART RATE: 88 BPM | HEIGHT: 66 IN | TEMPERATURE: 98.6 F | OXYGEN SATURATION: 96 %

## 2018-06-04 DIAGNOSIS — F51.01 PRIMARY INSOMNIA: ICD-10-CM

## 2018-06-04 DIAGNOSIS — K21.9 GASTROESOPHAGEAL REFLUX DISEASE WITHOUT ESOPHAGITIS: ICD-10-CM

## 2018-06-04 DIAGNOSIS — E78.00 PURE HYPERCHOLESTEROLEMIA: Primary | ICD-10-CM

## 2018-06-04 DIAGNOSIS — J02.9 PHARYNGITIS, UNSPECIFIED ETIOLOGY: ICD-10-CM

## 2018-06-04 DIAGNOSIS — E03.9 ACQUIRED HYPOTHYROIDISM: ICD-10-CM

## 2018-06-04 DIAGNOSIS — J06.9 UPPER RESPIRATORY TRACT INFECTION, UNSPECIFIED TYPE: ICD-10-CM

## 2018-06-04 LAB
ALBUMIN SERPL-MCNC: 4.4 G/DL (ref 3.2–4.8)
ALBUMIN/GLOB SERPL: 1.7 G/DL (ref 1.5–2.5)
ALP SERPL-CCNC: 88 U/L (ref 25–100)
ALT SERPL W P-5'-P-CCNC: 19 U/L (ref 7–40)
ANION GAP SERPL CALCULATED.3IONS-SCNC: 7 MMOL/L (ref 3–11)
ARTICHOKE IGE QN: 111 MG/DL (ref 0–130)
AST SERPL-CCNC: 22 U/L (ref 0–33)
BILIRUB SERPL-MCNC: 0.6 MG/DL (ref 0.3–1.2)
BUN BLD-MCNC: 13 MG/DL (ref 9–23)
BUN/CREAT SERPL: 21.7 (ref 7–25)
CALCIUM SPEC-SCNC: 9 MG/DL (ref 8.7–10.4)
CHLORIDE SERPL-SCNC: 103 MMOL/L (ref 99–109)
CHOLEST SERPL-MCNC: 172 MG/DL (ref 0–200)
CO2 SERPL-SCNC: 30 MMOL/L (ref 20–31)
CREAT BLD-MCNC: 0.6 MG/DL (ref 0.6–1.3)
EXPIRATION DATE: NORMAL
GFR SERPL CREATININE-BSD FRML MDRD: 104 ML/MIN/1.73
GLOBULIN UR ELPH-MCNC: 2.6 GM/DL
GLUCOSE BLD-MCNC: 94 MG/DL (ref 70–100)
HDLC SERPL-MCNC: 59 MG/DL (ref 40–60)
INTERNAL CONTROL: NORMAL
Lab: NORMAL
POTASSIUM BLD-SCNC: 4.4 MMOL/L (ref 3.5–5.5)
PROT SERPL-MCNC: 7 G/DL (ref 5.7–8.2)
S PYO AG THROAT QL: NEGATIVE
SODIUM BLD-SCNC: 140 MMOL/L (ref 132–146)
T4 FREE SERPL-MCNC: 1.27 NG/DL (ref 0.89–1.76)
TRIGL SERPL-MCNC: 66 MG/DL (ref 0–150)
TSH SERPL DL<=0.05 MIU/L-ACNC: 2.4 MIU/ML (ref 0.35–5.35)

## 2018-06-04 PROCEDURE — 84439 ASSAY OF FREE THYROXINE: CPT | Performed by: FAMILY MEDICINE

## 2018-06-04 PROCEDURE — 84443 ASSAY THYROID STIM HORMONE: CPT | Performed by: FAMILY MEDICINE

## 2018-06-04 PROCEDURE — 80061 LIPID PANEL: CPT | Performed by: FAMILY MEDICINE

## 2018-06-04 PROCEDURE — 87880 STREP A ASSAY W/OPTIC: CPT | Performed by: FAMILY MEDICINE

## 2018-06-04 PROCEDURE — 80053 COMPREHEN METABOLIC PANEL: CPT | Performed by: FAMILY MEDICINE

## 2018-06-04 PROCEDURE — 99214 OFFICE O/P EST MOD 30 MIN: CPT | Performed by: FAMILY MEDICINE

## 2018-06-04 RX ORDER — OMEPRAZOLE 20 MG/1
20 CAPSULE, DELAYED RELEASE ORAL DAILY
Qty: 30 CAPSULE | Refills: 5 | Status: SHIPPED | OUTPATIENT
Start: 2018-06-04 | End: 2018-12-10 | Stop reason: SDUPTHER

## 2018-06-04 RX ORDER — PRAVASTATIN SODIUM 20 MG
20 TABLET ORAL NIGHTLY
Qty: 30 TABLET | Refills: 5 | Status: SHIPPED | OUTPATIENT
Start: 2018-06-04 | End: 2018-12-22 | Stop reason: SDUPTHER

## 2018-06-04 RX ORDER — ZOLPIDEM TARTRATE 10 MG/1
10 TABLET ORAL NIGHTLY PRN
Qty: 30 TABLET | Refills: 2 | Status: SHIPPED | OUTPATIENT
Start: 2018-06-04 | End: 2018-09-11 | Stop reason: SDUPTHER

## 2018-06-04 RX ORDER — LEVOTHYROXINE SODIUM 0.15 MG/1
150 TABLET ORAL DAILY
Qty: 30 TABLET | Refills: 5 | Status: SHIPPED | OUTPATIENT
Start: 2018-06-04 | End: 2018-12-05 | Stop reason: SDUPTHER

## 2018-06-04 RX ORDER — FLUTICASONE PROPIONATE 50 MCG
2 SPRAY, SUSPENSION (ML) NASAL DAILY
Qty: 16 G | Refills: 5 | Status: SHIPPED | OUTPATIENT
Start: 2018-06-04

## 2018-06-04 NOTE — PROGRESS NOTES
"Wendy Lincoln is a 57 y.o. female.     Chief Complaint   Patient presents with   • 3 month follow up     hyperlipidemia, insomnia   • sore throat, ear pressure, productive cough     sx started yesterday, upset stomach, sinus pressure.       Visit Vitals  /84   Pulse 88   Temp 98.6 °F (37 °C) (Temporal Artery )   Ht 167 cm (65.75\")   Wt 105 kg (231 lb 9.6 oz)   LMP  (LMP Unknown) Comment: N/A   SpO2 96%   BMI 37.67 kg/m²         URI    This is a new problem. The current episode started yesterday. The problem has been unchanged. There has been no fever. Associated symptoms include congestion, coughing, ear pain, nausea, a plugged ear sensation, rhinorrhea, sinus pain, sneezing and a sore throat. Pertinent negatives include no abdominal pain, chest pain, diarrhea, dysuria, headaches, joint pain, joint swelling, neck pain, rash, swollen glands, vomiting or wheezing. She has tried nothing for the symptoms. The treatment provided no relief.   Hypothyroidism   This is a chronic problem. The current episode started more than 1 year ago. The problem occurs constantly. The problem has been unchanged. Associated symptoms include chills, congestion, coughing, diaphoresis, fatigue, nausea and a sore throat. Pertinent negatives include no abdominal pain, anorexia, arthralgias, change in bowel habit, chest pain, fever, headaches, joint swelling, myalgias, neck pain, numbness, rash, swollen glands, urinary symptoms, vertigo, visual change, vomiting or weakness. Nothing aggravates the symptoms. Treatments tried: thyroid. The treatment provided significant relief.   Hyperlipidemia   This is a chronic problem. The current episode started more than 1 year ago. Condition status: pending. Exacerbating diseases include hypothyroidism. She has no history of chronic renal disease, diabetes, liver disease, obesity or nephrotic syndrome. There are no known factors aggravating her hyperlipidemia. Pertinent negatives " include no chest pain, focal sensory loss, focal weakness, leg pain, myalgias or shortness of breath. Current antihyperlipidemic treatment includes statins. The current treatment provides moderate improvement of lipids. There are no compliance problems.  Risk factors for coronary artery disease include dyslipidemia, post-menopausal and family history.   Insomnia   This is a chronic problem. The current episode started more than 1 year ago. The problem occurs constantly. The problem has been unchanged. Associated symptoms include chills, congestion, coughing, diaphoresis, fatigue, nausea and a sore throat. Pertinent negatives include no abdominal pain, anorexia, arthralgias, change in bowel habit, chest pain, fever, headaches, joint swelling, myalgias, neck pain, numbness, rash, swollen glands, urinary symptoms, vertigo, visual change, vomiting or weakness. Nothing aggravates the symptoms. Treatments tried: ambien. The treatment provided significant relief.        The following portions of the patient's history were reviewed and updated as appropriate: allergies, current medications, past family history, past medical history, past social history, past surgical history and problem list.    Past Medical History:   Diagnosis Date   • Anxiety    • Breast cyst 01/25/2017    right on sono   • Calculus of kidney    • Carbuncle and furuncle of buttock 08/16/2012   • Chalazion    • GERD (gastroesophageal reflux disease)    • Goiter    • H/O bone density study 03/28/2018    nl   • Hyperlipidemia    • Hypothyroid    • Insomnia    • Persistent disorder of initiating or maintaining sleep    • Routine general medical examination at a health care facility 02/19/2013   • Routine general medical examination at a health care facility 02/16/2012   • Sacroiliac pain    • Sicca syndrome    • Sleep apnea    • Visit for routine gyn exam 02/16/2012      Past Surgical History:   Procedure Laterality Date   • REPLACEMENT TOTAL KNEE BILATERAL      • TOTAL ABDOMINAL HYSTERECTOMY WITH SALPINGO OOPHORECTOMY  2002      Family History   Problem Relation Age of Onset   • Thyroid disease Mother    • Memory loss Mother    • Alzheimer's disease Mother    • Other Mother         BCCA; Blood Clot   • Heart disease Father    • Atrial fibrillation Father    • Cirrhosis Father    • Depression Father    • Other Father         SCCA   • Alzheimer's disease Maternal Aunt    • Breast cancer Maternal Grandmother 80   • Ovarian cancer Neg Hx    • BRCA 1/2 Neg Hx       Social History     Social History   • Marital status:      Spouse name: N/A   • Number of children: N/A   • Years of education: N/A     Occupational History   • Not on file.     Social History Main Topics   • Smoking status: Former Smoker   • Smokeless tobacco: Never Used      Comment: quit 30 + years   • Alcohol use 3.0 oz/week     5 Glasses of wine per week      Comment: red wine   • Drug use: No   • Sexual activity: Yes     Partners: Male     Birth control/ protection: Post-menopausal, Surgical     Other Topics Concern   • Not on file     Social History Narrative   • No narrative on file        Review of Systems   Constitutional: Positive for chills, diaphoresis and fatigue. Negative for fever.   HENT: Positive for congestion, ear pain, postnasal drip, rhinorrhea, sinus pain, sinus pressure, sneezing, sore throat and trouble swallowing. Negative for nosebleeds.    Eyes: Positive for discharge (watery). Negative for redness and itching.   Respiratory: Positive for cough. Negative for shortness of breath and wheezing.    Cardiovascular: Negative.  Negative for chest pain and palpitations.   Gastrointestinal: Positive for constipation and nausea. Negative for abdominal pain, anorexia, change in bowel habit, diarrhea and vomiting.   Endocrine: Negative.  Negative for cold intolerance, heat intolerance, polydipsia, polyphagia and polyuria.   Genitourinary: Negative.  Negative for dysuria, frequency, hematuria  and urgency.   Musculoskeletal: Negative.  Negative for arthralgias, back pain, joint pain, joint swelling, myalgias and neck pain.   Skin: Negative.  Negative for color change and rash.   Allergic/Immunologic: Positive for environmental allergies.   Neurological: Negative.  Negative for dizziness, vertigo, focal weakness, syncope, weakness, light-headedness, numbness and headaches.   Hematological: Negative.  Negative for adenopathy. Does not bruise/bleed easily.   Psychiatric/Behavioral: Positive for sleep disturbance. Negative for dysphoric mood. The patient has insomnia. The patient is not nervous/anxious.        Objective   Physical Exam   Constitutional: She is oriented to person, place, and time. She appears well-developed.   HENT:   Head: Normocephalic.   Right Ear: Tympanic membrane, external ear and ear canal normal.   Left Ear: Tympanic membrane, external ear and ear canal normal.   Nose: Rhinorrhea present. Right sinus exhibits no maxillary sinus tenderness and no frontal sinus tenderness. Left sinus exhibits no maxillary sinus tenderness and no frontal sinus tenderness.   Mouth/Throat: Uvula is midline and mucous membranes are normal. Posterior oropharyngeal erythema present. No oropharyngeal exudate or posterior oropharyngeal edema.   Eyes: Conjunctivae, EOM and lids are normal. Pupils are equal, round, and reactive to light.   Neck: Trachea normal and normal range of motion. Neck supple. Carotid bruit is not present. No thyroid mass and no thyromegaly present.   Cardiovascular: Normal rate and regular rhythm.    No murmur heard.  Pulmonary/Chest: Effort normal and breath sounds normal. No respiratory distress. She has no decreased breath sounds. She has no wheezes. She has no rhonchi. She has no rales. She exhibits no tenderness.   Abdominal: Soft. Bowel sounds are normal. There is no tenderness.   Musculoskeletal: Normal range of motion.   Neurological: She is alert and oriented to person, place, and  time.   Skin: Skin is warm and dry.   Psychiatric: She has a normal mood and affect. Her behavior is normal.   Nursing note and vitals reviewed.      Assessment/Plan   Ana was seen today for 3 month follow up and sore throat, ear pressure, productive cough.    Diagnoses and all orders for this visit:    Pure hypercholesterolemia  -     pravastatin (PRAVACHOL) 20 MG tablet; Take 1 tablet by mouth Every Night.  -     Comprehensive Metabolic Panel  -     Lipid Panel    Primary insomnia  -     zolpidem (AMBIEN) 10 MG tablet; Take 1 tablet by mouth At Night As Needed for Sleep.    Acquired hypothyroidism  -     levothyroxine (SYNTHROID, LEVOTHROID) 150 MCG tablet; Take 1 tablet by mouth Daily.  -     T4, Free  -     TSH    Gastroesophageal reflux disease without esophagitis  -     omeprazole (priLOSEC) 20 MG capsule; Take 1 capsule by mouth Daily.    Pharyngitis, unspecified etiology  -     POC Rapid Strep A    Upper respiratory tract infection, unspecified type    Other orders  -     fluticasone (FLONASE) 50 MCG/ACT nasal spray; 2 sprays into each nostril Daily. Administer 2 sprays in each nostril for each dose.      Push fluids and antihistamine.  Rest.     Off work today         Current Outpatient Prescriptions:   •  cetirizine (ZyrTEC) 10 MG tablet, Take 10 mg by mouth daily., Disp: , Rfl:   •  fluticasone (FLONASE) 50 MCG/ACT nasal spray, 2 sprays into each nostril Daily. Administer 2 sprays in each nostril for each dose., Disp: 16 g, Rfl: 5  •  levothyroxine (SYNTHROID, LEVOTHROID) 150 MCG tablet, Take 1 tablet by mouth Daily., Disp: 30 tablet, Rfl: 5  •  naproxen (NAPROSYN) 500 MG tablet, Take 1 tablet by mouth At Night As Needed for Mild Pain ., Disp: 30 tablet, Rfl: 5  •  omeprazole (priLOSEC) 20 MG capsule, Take 1 capsule by mouth Daily., Disp: 30 capsule, Rfl: 5  •  pravastatin (PRAVACHOL) 20 MG tablet, Take 1 tablet by mouth Every Night., Disp: 30 tablet, Rfl: 5  •  zolpidem (AMBIEN) 10 MG tablet, Take 1  tablet by mouth At Night As Needed for Sleep., Disp: 30 tablet, Rfl: 2    Return in about 3 months (around 9/4/2018), or if symptoms worsen or fail to improve, for Recheck.    Recent Results (from the past 168 hour(s))   Comprehensive Metabolic Panel    Collection Time: 06/04/18  8:27 AM   Result Value Ref Range    Glucose 94 70 - 100 mg/dL    BUN 13 9 - 23 mg/dL    Creatinine 0.60 0.60 - 1.30 mg/dL    Sodium 140 132 - 146 mmol/L    Potassium 4.4 3.5 - 5.5 mmol/L    Chloride 103 99 - 109 mmol/L    CO2 30.0 20.0 - 31.0 mmol/L    Calcium 9.0 8.7 - 10.4 mg/dL    Total Protein 7.0 5.7 - 8.2 g/dL    Albumin 4.40 3.20 - 4.80 g/dL    ALT (SGPT) 19 7 - 40 U/L    AST (SGOT) 22 0 - 33 U/L    Alkaline Phosphatase 88 25 - 100 U/L    Total Bilirubin 0.6 0.3 - 1.2 mg/dL    eGFR Non African Amer 104 >60 mL/min/1.73    Globulin 2.6 gm/dL    A/G Ratio 1.7 1.5 - 2.5 g/dL    BUN/Creatinine Ratio 21.7 7.0 - 25.0    Anion Gap 7.0 3.0 - 11.0 mmol/L   Lipid Panel    Collection Time: 06/04/18  8:27 AM   Result Value Ref Range    Total Cholesterol 172 0 - 200 mg/dL    Triglycerides 66 0 - 150 mg/dL    HDL Cholesterol 59 40 - 60 mg/dL    LDL Cholesterol  111 0 - 130 mg/dL   T4, Free    Collection Time: 06/04/18  8:27 AM   Result Value Ref Range    Free T4 1.27 0.89 - 1.76 ng/dL   TSH    Collection Time: 06/04/18  8:27 AM   Result Value Ref Range    TSH 2.405 0.350 - 5.350 mIU/mL   POC Rapid Strep A    Collection Time: 06/04/18  8:35 AM   Result Value Ref Range    Rapid Strep A Screen Negative Negative, VALID, INVALID, Not Performed    Internal Control Passed Passed    Lot Number [id number]     Expiration Date 11-

## 2018-06-08 ENCOUNTER — TELEPHONE (OUTPATIENT)
Dept: INTERNAL MEDICINE | Facility: CLINIC | Age: 58
End: 2018-06-08

## 2018-06-08 DIAGNOSIS — F51.01 PRIMARY INSOMNIA: ICD-10-CM

## 2018-06-08 RX ORDER — CEPHALEXIN 500 MG/1
500 CAPSULE ORAL 3 TIMES DAILY
Qty: 30 CAPSULE | Refills: 0 | Status: SHIPPED | OUTPATIENT
Start: 2018-06-08 | End: 2018-09-11

## 2018-06-11 RX ORDER — ZOLPIDEM TARTRATE 10 MG/1
TABLET ORAL
Qty: 30 TABLET | Refills: 2 | OUTPATIENT
Start: 2018-06-11

## 2018-09-10 DIAGNOSIS — F51.01 PRIMARY INSOMNIA: ICD-10-CM

## 2018-09-10 RX ORDER — ZOLPIDEM TARTRATE 10 MG/1
TABLET ORAL
Qty: 30 TABLET | Refills: 2 | OUTPATIENT
Start: 2018-09-10

## 2018-09-11 ENCOUNTER — OFFICE VISIT (OUTPATIENT)
Dept: INTERNAL MEDICINE | Facility: CLINIC | Age: 58
End: 2018-09-11

## 2018-09-11 VITALS
OXYGEN SATURATION: 97 % | TEMPERATURE: 98.4 F | BODY MASS INDEX: 37.25 KG/M2 | WEIGHT: 231.8 LBS | DIASTOLIC BLOOD PRESSURE: 70 MMHG | HEIGHT: 66 IN | HEART RATE: 74 BPM | SYSTOLIC BLOOD PRESSURE: 116 MMHG

## 2018-09-11 DIAGNOSIS — E78.00 PURE HYPERCHOLESTEROLEMIA: ICD-10-CM

## 2018-09-11 DIAGNOSIS — F51.01 PRIMARY INSOMNIA: ICD-10-CM

## 2018-09-11 DIAGNOSIS — Z79.899 ENCOUNTER FOR LONG-TERM CURRENT USE OF MEDICATION: ICD-10-CM

## 2018-09-11 DIAGNOSIS — M79.601 PAIN OF RIGHT UPPER EXTREMITY: Primary | ICD-10-CM

## 2018-09-11 LAB
ALBUMIN SERPL-MCNC: 4.69 G/DL (ref 3.2–4.8)
ALBUMIN/GLOB SERPL: 2 G/DL (ref 1.5–2.5)
ALP SERPL-CCNC: 84 U/L (ref 25–100)
ALT SERPL W P-5'-P-CCNC: 18 U/L (ref 7–40)
AMPHET+METHAMPHET UR QL: NEGATIVE
AMPHETAMINES UR QL: NEGATIVE
ANION GAP SERPL CALCULATED.3IONS-SCNC: 9 MMOL/L (ref 3–11)
ARTICHOKE IGE QN: 128 MG/DL (ref 0–130)
AST SERPL-CCNC: 17 U/L (ref 0–33)
BARBITURATES UR QL SCN: NEGATIVE
BENZODIAZ UR QL SCN: NEGATIVE
BILIRUB SERPL-MCNC: 0.5 MG/DL (ref 0.3–1.2)
BUN BLD-MCNC: 17 MG/DL (ref 9–23)
BUN/CREAT SERPL: 23.6 (ref 7–25)
BUPRENORPHINE SERPL-MCNC: NEGATIVE NG/ML
CALCIUM SPEC-SCNC: 9.4 MG/DL (ref 8.7–10.4)
CANNABINOIDS SERPL QL: NEGATIVE
CHLORIDE SERPL-SCNC: 101 MMOL/L (ref 99–109)
CHOLEST SERPL-MCNC: 193 MG/DL (ref 0–200)
CK SERPL-CCNC: 53 U/L (ref 26–174)
CO2 SERPL-SCNC: 28 MMOL/L (ref 20–31)
COCAINE UR QL: NEGATIVE
CREAT BLD-MCNC: 0.72 MG/DL (ref 0.6–1.3)
CRP SERPL-MCNC: 1.05 MG/DL (ref 0–1)
ERYTHROCYTE [SEDIMENTATION RATE] IN BLOOD: 30 MM/HR (ref 0–30)
GFR SERPL CREATININE-BSD FRML MDRD: 84 ML/MIN/1.73
GLOBULIN UR ELPH-MCNC: 2.3 GM/DL
GLUCOSE BLD-MCNC: 95 MG/DL (ref 70–100)
HDLC SERPL-MCNC: 63 MG/DL (ref 40–60)
METHADONE UR QL SCN: NEGATIVE
OPIATES UR QL: NEGATIVE
OXYCODONE UR QL SCN: NEGATIVE
PCP UR QL SCN: NEGATIVE
POTASSIUM BLD-SCNC: 4 MMOL/L (ref 3.5–5.5)
PROPOXYPH UR QL: NEGATIVE
PROT SERPL-MCNC: 7 G/DL (ref 5.7–8.2)
SODIUM BLD-SCNC: 138 MMOL/L (ref 132–146)
TRICYCLICS UR QL SCN: NEGATIVE
TRIGL SERPL-MCNC: 128 MG/DL (ref 0–150)

## 2018-09-11 PROCEDURE — 80306 DRUG TEST PRSMV INSTRMNT: CPT | Performed by: FAMILY MEDICINE

## 2018-09-11 PROCEDURE — 80061 LIPID PANEL: CPT | Performed by: FAMILY MEDICINE

## 2018-09-11 PROCEDURE — 99214 OFFICE O/P EST MOD 30 MIN: CPT | Performed by: FAMILY MEDICINE

## 2018-09-11 PROCEDURE — 82550 ASSAY OF CK (CPK): CPT | Performed by: FAMILY MEDICINE

## 2018-09-11 PROCEDURE — 85652 RBC SED RATE AUTOMATED: CPT | Performed by: FAMILY MEDICINE

## 2018-09-11 PROCEDURE — 80053 COMPREHEN METABOLIC PANEL: CPT | Performed by: FAMILY MEDICINE

## 2018-09-11 PROCEDURE — 86140 C-REACTIVE PROTEIN: CPT | Performed by: FAMILY MEDICINE

## 2018-09-11 RX ORDER — ZOLPIDEM TARTRATE 10 MG/1
10 TABLET ORAL NIGHTLY PRN
Qty: 30 TABLET | Refills: 2 | Status: SHIPPED | OUTPATIENT
Start: 2018-09-11 | End: 2018-12-13 | Stop reason: SDUPTHER

## 2018-09-11 NOTE — PROGRESS NOTES
"Wendy Lincoln is a 58 y.o. female.     Chief Complaint   Patient presents with   • Insomnia     med refill       Visit Vitals  /70 (BP Location: Left arm)   Pulse 74   Temp 98.4 °F (36.9 °C) (Temporal Artery )   Ht 167 cm (65.75\")   Wt 105 kg (231 lb 12.8 oz)   LMP  (LMP Unknown) Comment: N/A   SpO2 97%   BMI 37.70 kg/m²         Arm Pain    Incident onset: 1 month ago. There was no injury mechanism. The pain is present in the upper right arm and right shoulder. The quality of the pain is described as burning and aching. The pain radiates to the right arm (to right wrist). The pain is at a severity of 10/10 (today not severe, up to 10). The pain is severe. The pain has been fluctuating since the incident. Pertinent negatives include no chest pain or numbness. The symptoms are aggravated by lifting. She has tried NSAIDs for the symptoms. The treatment provided significant relief.   Insomnia   This is a chronic problem. The current episode started more than 1 year ago. The problem occurs constantly. The problem has been unchanged. Associated symptoms include arthralgias (occasionally right shoulder hurts) and myalgias. Pertinent negatives include no abdominal pain, anorexia, change in bowel habit, chest pain, chills, congestion, coughing, diaphoresis, fatigue, fever, headaches, joint swelling, nausea, neck pain, numbness, rash, sore throat, swollen glands, urinary symptoms, vertigo, visual change, vomiting or weakness. The symptoms are aggravated by stress. Treatments tried: ambien. The treatment provided moderate relief.      Right shoulder pain for a month. Pt cannot lift with the right arm because of pain.  Pt states that pain is intermittent. Pt will have less pain with rest    Hypothyroid and hyperlipidemia have been stable.  Pt plans to see Chiropractor for right arm if not improving.     The following portions of the patient's history were reviewed and updated as appropriate: allergies, " current medications, past family history, past medical history, past social history, past surgical history and problem list.    Past Medical History:   Diagnosis Date   • Anxiety    • Breast cyst 01/25/2017    right on sono   • Calculus of kidney    • Carbuncle and furuncle of buttock 08/16/2012   • Chalazion    • GERD (gastroesophageal reflux disease)    • Goiter    • H/O bone density study 03/28/2018    nl   • Hyperlipidemia    • Hypothyroid    • Insomnia    • Persistent disorder of initiating or maintaining sleep    • Routine general medical examination at a health care facility 02/19/2013   • Routine general medical examination at a health care facility 02/16/2012   • Sacroiliac pain    • Sicca syndrome (CMS/HCC)    • Sleep apnea    • Visit for routine gyn exam 02/16/2012      Past Surgical History:   Procedure Laterality Date   • REPLACEMENT TOTAL KNEE BILATERAL     • TOTAL ABDOMINAL HYSTERECTOMY WITH SALPINGO OOPHORECTOMY  2002      Family History   Problem Relation Age of Onset   • Thyroid disease Mother    • Memory loss Mother    • Alzheimer's disease Mother    • Other Mother         BCCA; Blood Clot   • Heart disease Father    • Atrial fibrillation Father    • Cirrhosis Father    • Depression Father    • Other Father         SCCA   • Alzheimer's disease Maternal Aunt    • Breast cancer Maternal Grandmother 80   • Ovarian cancer Neg Hx    • BRCA 1/2 Neg Hx       Social History     Social History   • Marital status:      Spouse name: N/A   • Number of children: N/A   • Years of education: N/A     Occupational History   • Not on file.     Social History Main Topics   • Smoking status: Former Smoker   • Smokeless tobacco: Never Used      Comment: quit 30 + years   • Alcohol use 3.0 oz/week     5 Glasses of wine per week      Comment: red wine   • Drug use: No   • Sexual activity: Yes     Partners: Male     Birth control/ protection: Post-menopausal, Surgical     Other Topics Concern   • Not on file      Social History Narrative   • No narrative on file        Review of Systems   Constitutional: Negative.  Negative for chills, diaphoresis, fatigue and fever.   HENT: Positive for postnasal drip and rhinorrhea. Negative for congestion, ear pain, nosebleeds, sinus pressure, sneezing and sore throat.    Eyes: Negative.  Negative for redness and itching.   Respiratory: Negative.  Negative for cough, shortness of breath and wheezing.    Cardiovascular: Negative.  Negative for chest pain and palpitations.   Gastrointestinal: Negative.  Negative for abdominal pain, anorexia, change in bowel habit, constipation, diarrhea, nausea and vomiting.   Endocrine: Negative.  Negative for cold intolerance and heat intolerance.   Genitourinary: Negative.  Negative for dysuria, frequency, hematuria and urgency.   Musculoskeletal: Positive for arthralgias (occasionally right shoulder hurts), back pain and myalgias. Negative for joint swelling and neck pain.   Skin: Negative.  Negative for color change and rash.   Allergic/Immunologic: Positive for environmental allergies.   Neurological: Negative.  Negative for dizziness, vertigo, syncope, weakness, light-headedness, numbness and headaches.   Hematological: Negative.  Negative for adenopathy. Does not bruise/bleed easily.   Psychiatric/Behavioral: Negative for dysphoric mood. The patient has insomnia. The patient is not nervous/anxious.        Objective   Physical Exam   Constitutional: She is oriented to person, place, and time. She appears well-developed.   HENT:   Head: Normocephalic.   Right Ear: External ear normal.   Left Ear: External ear normal.   Nose: Nose normal.   Eyes: Pupils are equal, round, and reactive to light. Conjunctivae, EOM and lids are normal.   Neck: Trachea normal and normal range of motion. Neck supple. Muscular tenderness (right) present. No spinous process tenderness present. Carotid bruit is not present. No thyroid mass and no thyromegaly present.        Cardiovascular: Normal rate and regular rhythm.    No murmur heard.  Pulmonary/Chest: Effort normal and breath sounds normal. No respiratory distress. She has no decreased breath sounds. She has no wheezes. She has no rhonchi. She has no rales. She exhibits no tenderness.   Abdominal: Soft. Bowel sounds are normal. There is no tenderness.   Musculoskeletal: Normal range of motion.        Right upper arm: She exhibits tenderness. She exhibits no bony tenderness, no swelling, no edema and no deformity.        Arms:  Neurological: She is alert and oriented to person, place, and time.   Negative Phalen and Tinel bilaterally.  Good  bilaterally   Skin: Skin is warm and dry.   Psychiatric: She has a normal mood and affect. Her behavior is normal.   Nursing note and vitals reviewed.      Assessment/Plan   Ana was seen today for insomnia.    Diagnoses and all orders for this visit:    Pain of right upper extremity  -     Sedimentation Rate  -     C-reactive Protein  -     CK    Primary insomnia  -     zolpidem (AMBIEN) 10 MG tablet; Take 1 tablet by mouth At Night As Needed for Sleep.    Encounter for long-term current use of medication  -     Comprehensive Metabolic Panel  -     Lipid Panel  -     Urine Drug Screen - Urine, Clean Catch    Pure hypercholesterolemia  -     Comprehensive Metabolic Panel  -     Lipid Panel        Hold pravachol if the pain is persisting.   Discussed Shingrix vaccine with pt,  that is currently available at the pharmacy.   Flu shot when available.          Current Outpatient Prescriptions:   •  cetirizine (ZyrTEC) 10 MG tablet, Take 10 mg by mouth daily., Disp: , Rfl:   •  fluticasone (FLONASE) 50 MCG/ACT nasal spray, 2 sprays into each nostril Daily. Administer 2 sprays in each nostril for each dose., Disp: 16 g, Rfl: 5  •  levothyroxine (SYNTHROID, LEVOTHROID) 150 MCG tablet, Take 1 tablet by mouth Daily., Disp: 30 tablet, Rfl: 5  •  naproxen (NAPROSYN) 500 MG tablet, Take 1  tablet by mouth At Night As Needed for Mild Pain ., Disp: 30 tablet, Rfl: 5  •  omeprazole (priLOSEC) 20 MG capsule, Take 1 capsule by mouth Daily., Disp: 30 capsule, Rfl: 5  •  pravastatin (PRAVACHOL) 20 MG tablet, Take 1 tablet by mouth Every Night., Disp: 30 tablet, Rfl: 5  •  zolpidem (AMBIEN) 10 MG tablet, Take 1 tablet by mouth At Night As Needed for Sleep., Disp: 30 tablet, Rfl: 2    Return in about 3 months (around 12/11/2018), or if symptoms worsen or fail to improve, for Recheck.     The patient has read and signed the [facility] Controlled Substance Contract.  I will continue to see patient for regular follow up appointments.  They are well controlled on their medication.  TOBIAS is updated every 3 months. The patient is aware of the potential for addiction and dependence.    Tobias report reviewed and is consistent #[id number]

## 2018-12-05 DIAGNOSIS — E03.9 ACQUIRED HYPOTHYROIDISM: ICD-10-CM

## 2018-12-05 RX ORDER — LEVOTHYROXINE SODIUM 0.15 MG/1
TABLET ORAL
Qty: 30 TABLET | Refills: 2 | Status: SHIPPED | OUTPATIENT
Start: 2018-12-05 | End: 2019-01-04 | Stop reason: SDUPTHER

## 2018-12-10 DIAGNOSIS — K21.9 GASTROESOPHAGEAL REFLUX DISEASE WITHOUT ESOPHAGITIS: ICD-10-CM

## 2018-12-10 RX ORDER — OMEPRAZOLE 20 MG/1
CAPSULE, DELAYED RELEASE ORAL
Qty: 30 CAPSULE | Refills: 5 | Status: SHIPPED | OUTPATIENT
Start: 2018-12-10 | End: 2019-06-04 | Stop reason: SDUPTHER

## 2018-12-13 DIAGNOSIS — F51.01 PRIMARY INSOMNIA: ICD-10-CM

## 2018-12-13 RX ORDER — ZOLPIDEM TARTRATE 10 MG/1
10 TABLET ORAL NIGHTLY PRN
Qty: 30 TABLET | Refills: 0 | Status: SHIPPED | OUTPATIENT
Start: 2018-12-13 | End: 2019-01-04 | Stop reason: SDUPTHER

## 2018-12-13 NOTE — TELEPHONE ENCOUNTER
Patient has appointment on 1/4 we had r/s her appointment in the past. She needs refill on her zolpidem (AMBIEN) 10 MG tablet enough to do her until this appointment.

## 2018-12-14 ENCOUNTER — TRANSCRIBE ORDERS (OUTPATIENT)
Dept: ADMINISTRATIVE | Facility: HOSPITAL | Age: 58
End: 2018-12-14

## 2018-12-14 DIAGNOSIS — Z12.31 VISIT FOR SCREENING MAMMOGRAM: Primary | ICD-10-CM

## 2018-12-22 DIAGNOSIS — E78.00 PURE HYPERCHOLESTEROLEMIA: ICD-10-CM

## 2018-12-24 RX ORDER — PRAVASTATIN SODIUM 20 MG
TABLET ORAL
Qty: 30 TABLET | Refills: 0 | Status: SHIPPED | OUTPATIENT
Start: 2018-12-24 | End: 2019-01-04 | Stop reason: SDUPTHER

## 2019-01-04 ENCOUNTER — OFFICE VISIT (OUTPATIENT)
Dept: INTERNAL MEDICINE | Facility: CLINIC | Age: 59
End: 2019-01-04

## 2019-01-04 VITALS
HEIGHT: 66 IN | TEMPERATURE: 98 F | WEIGHT: 233.6 LBS | HEART RATE: 95 BPM | OXYGEN SATURATION: 98 % | DIASTOLIC BLOOD PRESSURE: 82 MMHG | SYSTOLIC BLOOD PRESSURE: 128 MMHG | BODY MASS INDEX: 37.54 KG/M2

## 2019-01-04 DIAGNOSIS — E03.9 ACQUIRED HYPOTHYROIDISM: ICD-10-CM

## 2019-01-04 DIAGNOSIS — E78.00 PURE HYPERCHOLESTEROLEMIA: ICD-10-CM

## 2019-01-04 DIAGNOSIS — F51.01 PRIMARY INSOMNIA: ICD-10-CM

## 2019-01-04 LAB
ALBUMIN SERPL-MCNC: 4.39 G/DL (ref 3.2–4.8)
ALBUMIN/GLOB SERPL: 2.3 G/DL (ref 1.5–2.5)
ALP SERPL-CCNC: 90 U/L (ref 25–100)
ALT SERPL W P-5'-P-CCNC: 18 U/L (ref 7–40)
ANION GAP SERPL CALCULATED.3IONS-SCNC: 9 MMOL/L (ref 3–11)
ARTICHOKE IGE QN: 122 MG/DL (ref 0–130)
AST SERPL-CCNC: 17 U/L (ref 0–33)
BILIRUB SERPL-MCNC: 0.4 MG/DL (ref 0.3–1.2)
BUN BLD-MCNC: 14 MG/DL (ref 9–23)
BUN/CREAT SERPL: 19.7 (ref 7–25)
CALCIUM SPEC-SCNC: 9.1 MG/DL (ref 8.7–10.4)
CHLORIDE SERPL-SCNC: 100 MMOL/L (ref 99–109)
CHOLEST SERPL-MCNC: 194 MG/DL (ref 0–200)
CO2 SERPL-SCNC: 28 MMOL/L (ref 20–31)
CREAT BLD-MCNC: 0.71 MG/DL (ref 0.6–1.3)
GFR SERPL CREATININE-BSD FRML MDRD: 85 ML/MIN/1.73
GLOBULIN UR ELPH-MCNC: 1.9 GM/DL
GLUCOSE BLD-MCNC: 94 MG/DL (ref 70–100)
HDLC SERPL-MCNC: 64 MG/DL (ref 40–60)
POTASSIUM BLD-SCNC: 4.2 MMOL/L (ref 3.5–5.5)
PROT SERPL-MCNC: 6.3 G/DL (ref 5.7–8.2)
SODIUM BLD-SCNC: 137 MMOL/L (ref 132–146)
T4 FREE SERPL-MCNC: 1.49 NG/DL (ref 0.89–1.76)
TRIGL SERPL-MCNC: 153 MG/DL (ref 0–150)
TSH SERPL DL<=0.05 MIU/L-ACNC: 2.33 MIU/ML (ref 0.35–5.35)

## 2019-01-04 PROCEDURE — 99214 OFFICE O/P EST MOD 30 MIN: CPT | Performed by: FAMILY MEDICINE

## 2019-01-04 PROCEDURE — 80053 COMPREHEN METABOLIC PANEL: CPT | Performed by: FAMILY MEDICINE

## 2019-01-04 PROCEDURE — 84443 ASSAY THYROID STIM HORMONE: CPT | Performed by: FAMILY MEDICINE

## 2019-01-04 PROCEDURE — 80061 LIPID PANEL: CPT | Performed by: FAMILY MEDICINE

## 2019-01-04 PROCEDURE — 84439 ASSAY OF FREE THYROXINE: CPT | Performed by: FAMILY MEDICINE

## 2019-01-04 RX ORDER — ZOLPIDEM TARTRATE 10 MG/1
10 TABLET ORAL NIGHTLY PRN
Qty: 30 TABLET | Refills: 2 | Status: SHIPPED | OUTPATIENT
Start: 2019-01-04 | End: 2019-04-26 | Stop reason: SDUPTHER

## 2019-01-04 RX ORDER — LEVOTHYROXINE SODIUM 0.15 MG/1
150 TABLET ORAL DAILY
Qty: 30 TABLET | Refills: 5 | Status: SHIPPED | OUTPATIENT
Start: 2019-01-04 | End: 2019-07-19 | Stop reason: SDUPTHER

## 2019-01-04 RX ORDER — PRAVASTATIN SODIUM 20 MG
20 TABLET ORAL
Qty: 30 TABLET | Refills: 5 | Status: SHIPPED | OUTPATIENT
Start: 2019-01-04 | End: 2019-07-19 | Stop reason: SDUPTHER

## 2019-01-04 RX ORDER — ZOLPIDEM TARTRATE 10 MG/1
10 TABLET ORAL NIGHTLY PRN
Qty: 30 TABLET | Refills: 0 | Status: CANCELLED | OUTPATIENT
Start: 2019-01-04

## 2019-01-04 NOTE — PROGRESS NOTES
"Wendy Lincoln is a 58 y.o. female.     Chief Complaint   Patient presents with   • Insomnia     refills on ambien   • Hyperlipidemia   • Hypothyroidism       Visit Vitals  /82   Pulse 95   Temp 98 °F (36.7 °C)   Ht 167 cm (65.75\")   Wt 106 kg (233 lb 9.6 oz)   LMP  (LMP Unknown) Comment: N/A   SpO2 98%   BMI 37.99 kg/m²         Insomnia   This is a chronic problem. The current episode started more than 1 year ago. The problem occurs constantly. The problem has been unchanged. Associated symptoms include abdominal pain (better after burping), congestion, fatigue, myalgias and nausea (New Years Corina). Pertinent negatives include no anorexia, arthralgias, change in bowel habit, chest pain, chills, coughing, diaphoresis, fever, headaches, joint swelling, neck pain, numbness, rash, sore throat, swollen glands, urinary symptoms, vertigo, visual change, vomiting or weakness. Exacerbated by:  does sleep well. Treatments tried: ambien. The treatment provided significant relief.   Hypothyroidism   This is a chronic problem. The current episode started more than 1 year ago. The problem occurs constantly. The problem has been unchanged. Associated symptoms include abdominal pain (better after burping), congestion, fatigue, myalgias and nausea (New Years Corina). Pertinent negatives include no anorexia, arthralgias, change in bowel habit, chest pain, chills, coughing, diaphoresis, fever, headaches, joint swelling, neck pain, numbness, rash, sore throat, swollen glands, urinary symptoms, vertigo, visual change, vomiting or weakness. Nothing aggravates the symptoms. Treatments tried: thyroid. The treatment provided significant relief.   Hyperlipidemia   This is a chronic problem. The current episode started more than 1 year ago. The problem is controlled. Recent lipid tests were reviewed and are normal. Exacerbating diseases include hypothyroidism and obesity. She has no history of chronic renal disease, " diabetes, liver disease or nephrotic syndrome. There are no known factors aggravating her hyperlipidemia. Associated symptoms include myalgias. Pertinent negatives include no chest pain, focal sensory loss, focal weakness, leg pain or shortness of breath. Current antihyperlipidemic treatment includes statins. The current treatment provides significant improvement of lipids. There are no compliance problems.  Risk factors for coronary artery disease include dyslipidemia, obesity, post-menopausal and family history.      Pt here for f/u of insomnia that has been going on since age 42 post hysterectomy.   Pt's  tosses and turns and talks in is sleep. Pt has trouble also falling asleep unrelated to her .    The following portions of the patient's history were reviewed and updated as appropriate: allergies, current medications, past family history, past medical history, past social history, past surgical history and problem list.    Past Medical History:   Diagnosis Date   • Anxiety    • Breast cyst 01/25/2017    right on sono   • Calculus of kidney    • Carbuncle and furuncle of buttock 08/16/2012   • Chalazion    • GERD (gastroesophageal reflux disease)    • Goiter    • H/O bone density study 03/28/2018    nl   • Hyperlipidemia    • Hypothyroid    • Insomnia    • Persistent disorder of initiating or maintaining sleep    • Routine general medical examination at a health care facility 02/19/2013   • Routine general medical examination at a health care facility 02/16/2012   • Sacroiliac pain    • Sicca syndrome (CMS/HCC)    • Sleep apnea    • Visit for routine gyn exam 02/16/2012      Past Surgical History:   Procedure Laterality Date   • REPLACEMENT TOTAL KNEE BILATERAL     • TOTAL ABDOMINAL HYSTERECTOMY WITH SALPINGO OOPHORECTOMY  2002      Family History   Problem Relation Age of Onset   • Thyroid disease Mother    • Memory loss Mother    • Alzheimer's disease Mother    • Other Mother         BCCA; Blood  Clot   • Heart disease Father    • Atrial fibrillation Father    • Cirrhosis Father    • Depression Father    • Other Father         SCCA   • Alzheimer's disease Maternal Aunt    • Breast cancer Maternal Grandmother 80   • Ovarian cancer Neg Hx    • BRCA 1/2 Neg Hx       Social History     Socioeconomic History   • Marital status:      Spouse name: Not on file   • Number of children: Not on file   • Years of education: Not on file   • Highest education level: Not on file   Social Needs   • Financial resource strain: Not on file   • Food insecurity - worry: Not on file   • Food insecurity - inability: Not on file   • Transportation needs - medical: Not on file   • Transportation needs - non-medical: Not on file   Occupational History   • Not on file   Tobacco Use   • Smoking status: Former Smoker   • Smokeless tobacco: Never Used   • Tobacco comment: quit 30 + years   Substance and Sexual Activity   • Alcohol use: Yes     Alcohol/week: 3.0 oz     Types: 5 Glasses of wine per week     Comment: red wine   • Drug use: No   • Sexual activity: Yes     Partners: Male     Birth control/protection: Post-menopausal, Surgical   Other Topics Concern   • Not on file   Social History Narrative   • Not on file        Review of Systems   Constitutional: Positive for fatigue. Negative for chills, diaphoresis and fever.   HENT: Positive for congestion, postnasal drip, rhinorrhea and sinus pressure. Negative for ear pain, nosebleeds, sneezing and sore throat.    Eyes: Negative.  Negative for redness and itching.   Respiratory: Positive for wheezing (intermittent starting before Ghulam-now resolved). Negative for cough and shortness of breath.    Cardiovascular: Negative.  Negative for chest pain and palpitations.   Gastrointestinal: Positive for abdominal pain (better after burping) and nausea (New Years Corina). Negative for anorexia, change in bowel habit, constipation, diarrhea and vomiting.   Endocrine: Positive for cold  intolerance (this week). Negative for heat intolerance.   Genitourinary: Negative.  Negative for dysuria, frequency, hematuria and urgency.   Musculoskeletal: Positive for back pain and myalgias. Negative for arthralgias, joint swelling and neck pain.   Skin: Negative.  Negative for color change and rash.   Allergic/Immunologic: Positive for environmental allergies.   Neurological: Negative.  Negative for dizziness, vertigo, focal weakness, syncope, weakness, light-headedness, numbness and headaches.   Hematological: Negative.  Negative for adenopathy. Does not bruise/bleed easily.   Psychiatric/Behavioral: Positive for sleep disturbance. Negative for dysphoric mood. The patient has insomnia. The patient is not nervous/anxious.        Objective   Physical Exam   Constitutional: She is oriented to person, place, and time. She appears well-developed.   HENT:   Head: Normocephalic.   Right Ear: External ear normal.   Left Ear: External ear normal.   Nose: Nose normal.   Eyes: Conjunctivae, EOM and lids are normal. Pupils are equal, round, and reactive to light.   Neck: Trachea normal and normal range of motion. Neck supple. Carotid bruit is not present. No thyroid mass and no thyromegaly present.   Cardiovascular: Normal rate and regular rhythm.   No murmur heard.  Pulmonary/Chest: Effort normal and breath sounds normal. No respiratory distress. She has no decreased breath sounds. She has no wheezes. She has no rhonchi. She has no rales. She exhibits no tenderness.   Abdominal: Soft. Bowel sounds are normal. There is no tenderness.   Musculoskeletal: Normal range of motion.   Neurological: She is alert and oriented to person, place, and time.   Skin: Skin is warm and dry.   Psychiatric: She has a normal mood and affect. Her behavior is normal.   Nursing note and vitals reviewed.      Assessment/Plan   Ana was seen today for insomnia, hyperlipidemia and hypothyroidism.    Diagnoses and all orders for this  visit:    Primary insomnia  -     zolpidem (AMBIEN) 10 MG tablet; Take 1 tablet by mouth At Night As Needed for Sleep.    Pure hypercholesterolemia  -     pravastatin (PRAVACHOL) 20 MG tablet; Take 1 tablet by mouth every night at bedtime.  -     Comprehensive Metabolic Panel  -     Lipid Panel    Acquired hypothyroidism  -     levothyroxine (SYNTHROID, LEVOTHROID) 150 MCG tablet; Take 1 tablet by mouth Daily.  -     TSH  -     T4, Free    Other orders  -     Cancel: zolpidem (AMBIEN) 10 MG tablet; Take 1 tablet by mouth At Night As Needed for Sleep.      Discussed other meds for insomnia    Discussed Shingrix vaccine with pt,  that is currently available at the pharmacy.   Discussed hepatitis A vaccine           Current Outpatient Medications:   •  cetirizine (ZyrTEC) 10 MG tablet, Take 10 mg by mouth daily., Disp: , Rfl:   •  fluticasone (FLONASE) 50 MCG/ACT nasal spray, 2 sprays into each nostril Daily. Administer 2 sprays in each nostril for each dose., Disp: 16 g, Rfl: 5  •  levothyroxine (SYNTHROID, LEVOTHROID) 150 MCG tablet, Take 1 tablet by mouth Daily., Disp: 30 tablet, Rfl: 5  •  naproxen (NAPROSYN) 500 MG tablet, Take 1 tablet by mouth At Night As Needed for Mild Pain ., Disp: 30 tablet, Rfl: 5  •  omeprazole (priLOSEC) 20 MG capsule, take 1 capsule by mouth once daily, Disp: 30 capsule, Rfl: 5  •  pravastatin (PRAVACHOL) 20 MG tablet, Take 1 tablet by mouth every night at bedtime., Disp: 30 tablet, Rfl: 5  •  zolpidem (AMBIEN) 10 MG tablet, Take 1 tablet by mouth At Night As Needed for Sleep., Disp: 30 tablet, Rfl: 2    Return in about 3 months (around 4/4/2019), or if symptoms worsen or fail to improve, for Recheck.    [facility] report reviewed and is consistent #[id number]

## 2019-01-31 ENCOUNTER — APPOINTMENT (OUTPATIENT)
Dept: MAMMOGRAPHY | Facility: HOSPITAL | Age: 59
End: 2019-01-31

## 2019-02-04 ENCOUNTER — TELEPHONE (OUTPATIENT)
Dept: INTERNAL MEDICINE | Facility: CLINIC | Age: 59
End: 2019-02-04

## 2019-02-04 NOTE — TELEPHONE ENCOUNTER
RX was sent into pharmacy on 1/4/19 with 5 refills. LVM that she would need to contact her pharamcy

## 2019-03-06 RX ORDER — NAPROXEN 500 MG/1
TABLET ORAL
Qty: 30 TABLET | Refills: 5 | Status: SHIPPED | OUTPATIENT
Start: 2019-03-06 | End: 2020-02-03 | Stop reason: SDUPTHER

## 2019-03-11 ENCOUNTER — HOSPITAL ENCOUNTER (OUTPATIENT)
Dept: MAMMOGRAPHY | Facility: HOSPITAL | Age: 59
Discharge: HOME | End: 2019-03-11
Admitting: FAMILY MEDICINE

## 2019-03-11 DIAGNOSIS — Z12.31 VISIT FOR SCREENING MAMMOGRAM: ICD-10-CM

## 2019-03-11 PROCEDURE — 77067 SCR MAMMO BI INCL CAD: CPT | Performed by: RADIOLOGY

## 2019-03-11 PROCEDURE — 77067 SCR MAMMO BI INCL CAD: CPT

## 2019-03-11 PROCEDURE — 77063 BREAST TOMOSYNTHESIS BI: CPT

## 2019-03-11 PROCEDURE — 77063 BREAST TOMOSYNTHESIS BI: CPT | Performed by: RADIOLOGY

## 2019-03-13 ENCOUNTER — OFFICE VISIT (OUTPATIENT)
Dept: INTERNAL MEDICINE | Facility: CLINIC | Age: 59
End: 2019-03-13

## 2019-03-13 VITALS
DIASTOLIC BLOOD PRESSURE: 78 MMHG | WEIGHT: 232.4 LBS | TEMPERATURE: 98.4 F | SYSTOLIC BLOOD PRESSURE: 122 MMHG | BODY MASS INDEX: 37.35 KG/M2 | HEART RATE: 76 BPM | OXYGEN SATURATION: 98 % | HEIGHT: 66 IN

## 2019-03-13 DIAGNOSIS — Z12.11 SCREENING FOR COLON CANCER: ICD-10-CM

## 2019-03-13 DIAGNOSIS — Z00.00 ANNUAL PHYSICAL EXAM: Primary | ICD-10-CM

## 2019-03-13 LAB
BILIRUB BLD-MCNC: NEGATIVE MG/DL
CLARITY, POC: CLEAR
COLOR UR: YELLOW
DEVELOPER EXPIRATION DATE: NORMAL
DEVELOPER LOT NUMBER: NORMAL
EXPIRATION DATE: NORMAL
FECAL OCCULT BLOOD SCREEN, POC: NEGATIVE
GLUCOSE UR STRIP-MCNC: NEGATIVE MG/DL
KETONES UR QL: NEGATIVE
LEUKOCYTE EST, POC: NEGATIVE
Lab: NORMAL
NEGATIVE CONTROL: NEGATIVE
NITRITE UR-MCNC: NEGATIVE MG/ML
PH UR: 8 [PH] (ref 5–8)
POSITIVE CONTROL: POSITIVE
PROT UR STRIP-MCNC: NEGATIVE MG/DL
RBC # UR STRIP: NEGATIVE /UL
SP GR UR: 1.01 (ref 1–1.03)
UROBILINOGEN UR QL: NORMAL

## 2019-03-13 PROCEDURE — 81003 URINALYSIS AUTO W/O SCOPE: CPT | Performed by: FAMILY MEDICINE

## 2019-03-13 PROCEDURE — 99396 PREV VISIT EST AGE 40-64: CPT | Performed by: FAMILY MEDICINE

## 2019-03-13 PROCEDURE — 82270 OCCULT BLOOD FECES: CPT | Performed by: FAMILY MEDICINE

## 2019-03-13 NOTE — PROGRESS NOTES
"Wendy Lincoln is a 58 y.o. female.     Chief Complaint   Patient presents with   • Annual Exam     total hysterectomy       Visit Vitals  /78 (BP Location: Right arm, Patient Position: Sitting, Cuff Size: Large Adult)   Pulse 76   Temp 98.4 °F (36.9 °C)   Ht 166.4 cm (65.5\")   Wt 105 kg (232 lb 6.4 oz)   SpO2 98%   BMI 38.09 kg/m²       Vitals:    03/13/19 0816 03/13/19 0845   BP: 133/92 122/78   BP Location:  Right arm   Patient Position:  Sitting   Cuff Size:  Large Adult   Pulse: 76    Temp: 98.4 °F (36.9 °C)    SpO2: 98%    Weight: 105 kg (232 lb 6.4 oz)    Height: 166.4 cm (65.5\")    PainSc: 0-No pain        History of Present Illness   Pt was home sick Mon, Tue and Wed.   Pt went to [facility] Tuesday. Pt was advised that it was viral. Pt states that flu and strep test was negative.         The following portions of the patient's history were reviewed and updated as appropriate: allergies, current medications, past family history, past medical history, past social history, past surgical history and problem list.    Past Medical History:   Diagnosis Date   • Anxiety    • Calculus of kidney    • Carbuncle and furuncle of buttock 08/16/2012   • Chalazion    • GERD (gastroesophageal reflux disease)    • Goiter    • H/O bone density study 03/28/2018    nl   • Hyperlipidemia    • Hypothyroid    • Insomnia    • Persistent disorder of initiating or maintaining sleep    • Routine general medical examination at a health care facility 02/19/2013   • Routine general medical examination at a health care facility 02/16/2012   • Sacroiliac pain    • Sicca syndrome (CMS/HCC)    • Sleep apnea    • Visit for routine gyn exam 02/16/2012      Past Surgical History:   Procedure Laterality Date   • REPLACEMENT TOTAL KNEE BILATERAL     • TOTAL ABDOMINAL HYSTERECTOMY WITH SALPINGO OOPHORECTOMY Bilateral 2002      Family History   Problem Relation Age of Onset   • Thyroid disease Mother    • Memory loss Mother "    • Alzheimer's disease Mother    • Other Mother         BCCA; Blood Clot   • Heart disease Father    • Atrial fibrillation Father    • Cirrhosis Father    • Depression Father    • Other Father         SCCA   • Alzheimer's disease Maternal Aunt    • Breast cancer Maternal Grandmother 80   • Ovarian cancer Neg Hx    • BRCA 1/2 Neg Hx       Social History     Socioeconomic History   • Marital status:      Spouse name: Not on file   • Number of children: Not on file   • Years of education: Not on file   • Highest education level: Not on file   Social Needs   • Financial resource strain: Not on file   • Food insecurity - worry: Not on file   • Food insecurity - inability: Not on file   • Transportation needs - medical: Not on file   • Transportation needs - non-medical: Not on file   Occupational History   • Not on file   Tobacco Use   • Smoking status: Former Smoker   • Smokeless tobacco: Never Used   • Tobacco comment: quit 30 + years   Substance and Sexual Activity   • Alcohol use: Yes     Alcohol/week: 3.0 oz     Types: 5 Glasses of wine per week     Comment: red wine   • Drug use: No   • Sexual activity: Yes     Partners: Male     Birth control/protection: Post-menopausal, Surgical   Other Topics Concern   • Not on file   Social History Narrative   • Not on file      Allergies   Allergen Reactions   • Morphine And Related Swelling     Tongue swells, doesn't help pain   • Erythromycin Nausea Only   • Requip [Ropinirole Hcl] Nausea Only       Review of Systems   Constitutional: Negative.  Negative for activity change, appetite change, chills, diaphoresis, fatigue, fever and unexpected weight change.   HENT: Positive for congestion and postnasal drip. Negative for dental problem, drooling, ear discharge, ear pain, facial swelling, hearing loss, mouth sores, nosebleeds, rhinorrhea, sinus pressure, sinus pain, sneezing, sore throat, tinnitus, trouble swallowing and voice change.    Eyes: Negative.  Negative for  photophobia, pain, discharge, redness, itching and visual disturbance.   Respiratory: Positive for wheezing (last night). Negative for apnea, cough, choking, chest tightness, shortness of breath and stridor.    Cardiovascular: Negative.  Negative for chest pain, palpitations and leg swelling.   Gastrointestinal: Negative.  Negative for abdominal distention, abdominal pain, anal bleeding, blood in stool, constipation, diarrhea, nausea, rectal pain and vomiting.   Endocrine: Negative.  Negative for cold intolerance, heat intolerance, polydipsia, polyphagia and polyuria.   Genitourinary: Negative.  Negative for decreased urine volume, difficulty urinating, dyspareunia, dysuria, enuresis, flank pain, frequency, genital sores, hematuria, menstrual problem, pelvic pain, urgency, vaginal bleeding, vaginal discharge and vaginal pain.   Musculoskeletal: Negative.  Negative for arthralgias, back pain, gait problem, joint swelling, myalgias, neck pain and neck stiffness.   Skin: Negative.  Negative for color change, pallor, rash and wound.        Knots under skin abdomen   Allergic/Immunologic: Positive for environmental allergies. Negative for food allergies and immunocompromised state.   Neurological: Negative.  Negative for dizziness, tremors, seizures, syncope, facial asymmetry, speech difficulty, weakness, light-headedness, numbness and headaches.   Hematological: Negative.  Negative for adenopathy. Does not bruise/bleed easily.   Psychiatric/Behavioral: Negative.  Negative for agitation, behavioral problems, confusion, decreased concentration, dysphoric mood, hallucinations, self-injury, sleep disturbance and suicidal ideas. The patient is not nervous/anxious and is not hyperactive.        Objective   Physical Exam   Constitutional: She is oriented to person, place, and time. She appears well-developed and well-nourished. No distress.   HENT:   Head: Normocephalic and atraumatic.   Right Ear: Tympanic membrane, external  ear and ear canal normal.   Left Ear: Tympanic membrane, external ear and ear canal normal.   Nose: Nose normal.   Mouth/Throat: Uvula is midline, oropharynx is clear and moist and mucous membranes are normal. Mucous membranes are not pale, not dry and not cyanotic. Normal dentition. No oropharyngeal exudate, posterior oropharyngeal edema or posterior oropharyngeal erythema.   Eyes: Conjunctivae, EOM and lids are normal. Pupils are equal, round, and reactive to light. Right eye exhibits no chemosis, no discharge, no exudate and no hordeolum. No foreign body present in the right eye. Left eye exhibits no chemosis, no discharge, no exudate and no hordeolum. No foreign body present in the left eye. Right conjunctiva is not injected. Right conjunctiva has no hemorrhage. Left conjunctiva is not injected. Left conjunctiva has no hemorrhage. No scleral icterus. Right eye exhibits normal extraocular motion and no nystagmus. Left eye exhibits normal extraocular motion and no nystagmus.   Fundoscopic exam:       The right eye shows red reflex.        The left eye shows red reflex.   Neck: Trachea normal and normal range of motion. Neck supple. Carotid bruit is not present. No tracheal deviation present. No thyroid mass and no thyromegaly present.   Cardiovascular: Normal rate, regular rhythm, normal heart sounds and intact distal pulses. Exam reveals no gallop and no friction rub.   No murmur heard.  Pulses:       Dorsalis pedis pulses are 2+ on the right side, and 2+ on the left side.        Posterior tibial pulses are 2+ on the right side, and 2+ on the left side.   Pulmonary/Chest: Effort normal and breath sounds normal. No respiratory distress. She has no decreased breath sounds. She has no wheezes. She has no rhonchi. She has no rales. Chest wall is not dull to percussion. She exhibits no tenderness. Right breast exhibits no inverted nipple, no mass, no nipple discharge, no skin change and no tenderness. Left breast  exhibits no inverted nipple, no mass, no nipple discharge, no skin change and no tenderness.   Abdominal: Soft. Bowel sounds are normal. She exhibits no distension and no mass. There is no hepatosplenomegaly. There is no tenderness. There is no rebound and no guarding.   Genitourinary: Rectum normal. Rectal exam shows no external hemorrhoid, no internal hemorrhoid, no fissure, no mass, no tenderness, anal tone normal and guaiac negative stool.   Musculoskeletal: Normal range of motion. She exhibits no edema, tenderness or deformity.   Lymphadenopathy:        Head (right side): No submandibular adenopathy present.        Head (left side): No submandibular adenopathy present.     She has no cervical adenopathy.        Right cervical: No superficial cervical, no deep cervical and no posterior cervical adenopathy present.       Left cervical: No superficial cervical, no deep cervical and no posterior cervical adenopathy present.     She has no axillary adenopathy.        Right axillary: No pectoral and no lateral adenopathy present.        Left axillary: No pectoral and no lateral adenopathy present.       Right: No inguinal and no supraclavicular adenopathy present.        Left: No inguinal and no supraclavicular adenopathy present.   Neurological: She is alert and oriented to person, place, and time. She has normal strength and normal reflexes. No cranial nerve deficit or sensory deficit. Coordination normal.   Reflex Scores:       Bicep reflexes are 2+ on the right side and 2+ on the left side.       Brachioradialis reflexes are 2+ on the right side and 2+ on the left side.       Patellar reflexes are 2+ on the right side and 2+ on the left side.  Skin: Skin is warm and dry. No rash noted. She is not diaphoretic. No cyanosis. Nails show no clubbing.   Psychiatric: She has a normal mood and affect. Her speech is normal and behavior is normal. Judgment and thought content normal. Cognition and memory are normal.    Nursing note and vitals reviewed.      Assessment/Plan   Ana was seen today for annual exam.    Diagnoses and all orders for this visit:    Annual physical exam  -     POCT urinalysis dipstick, automated    Screening for colon cancer  -     POC Occult Blood Stool      Discussed Shingrix vaccine with pt,  that is currently available at the pharmacy.     Please follow a low animal fat diet that is also low in sugar, low in junk food, low in sweet drinks and low in alcohol.  Please increase the amount of fiber in your diet as well as increasing your daily exercise, such as walking.             Current Outpatient Medications:   •  cetirizine (ZyrTEC) 10 MG tablet, Take 10 mg by mouth daily., Disp: , Rfl:   •  fluticasone (FLONASE) 50 MCG/ACT nasal spray, 2 sprays into each nostril Daily. Administer 2 sprays in each nostril for each dose., Disp: 16 g, Rfl: 5  •  levothyroxine (SYNTHROID, LEVOTHROID) 150 MCG tablet, Take 1 tablet by mouth Daily., Disp: 30 tablet, Rfl: 5  •  naproxen (NAPROSYN) 500 MG tablet, take 1 tablet by mouth every evening if needed for MILD PAIN, Disp: 30 tablet, Rfl: 5  •  omeprazole (priLOSEC) 20 MG capsule, take 1 capsule by mouth once daily, Disp: 30 capsule, Rfl: 5  •  pravastatin (PRAVACHOL) 20 MG tablet, Take 1 tablet by mouth every night at bedtime., Disp: 30 tablet, Rfl: 5  •  zolpidem (AMBIEN) 10 MG tablet, Take 1 tablet by mouth At Night As Needed for Sleep., Disp: 30 tablet, Rfl: 2    Return in about 6 months (around 9/13/2019), or if symptoms worsen or fail to improve, for Recheck.    Pt had negative Mammo this week.     Recent Results (from the past 2016 hour(s))   TSH    Collection Time: 01/04/19  8:44 AM   Result Value Ref Range    TSH 2.334 0.350 - 5.350 mIU/mL   T4, Free    Collection Time: 01/04/19  8:44 AM   Result Value Ref Range    Free T4 1.49 0.89 - 1.76 ng/dL   Comprehensive Metabolic Panel    Collection Time: 01/04/19  8:44 AM   Result Value Ref Range    Glucose 94 70  - 100 mg/dL    BUN 14 9 - 23 mg/dL    Creatinine 0.71 0.60 - 1.30 mg/dL    Sodium 137 132 - 146 mmol/L    Potassium 4.2 3.5 - 5.5 mmol/L    Chloride 100 99 - 109 mmol/L    CO2 28.0 20.0 - 31.0 mmol/L    Calcium 9.1 8.7 - 10.4 mg/dL    Total Protein 6.3 5.7 - 8.2 g/dL    Albumin 4.39 3.20 - 4.80 g/dL    ALT (SGPT) 18 7 - 40 U/L    AST (SGOT) 17 0 - 33 U/L    Alkaline Phosphatase 90 25 - 100 U/L    Total Bilirubin 0.4 0.3 - 1.2 mg/dL    eGFR Non African Amer 85 >60 mL/min/1.73    Globulin 1.9 gm/dL    A/G Ratio 2.3 1.5 - 2.5 g/dL    BUN/Creatinine Ratio 19.7 7.0 - 25.0    Anion Gap 9.0 3.0 - 11.0 mmol/L   Lipid Panel    Collection Time: 01/04/19  8:44 AM   Result Value Ref Range    Total Cholesterol 194 0 - 200 mg/dL    Triglycerides 153 (H) 0 - 150 mg/dL    HDL Cholesterol 64 (H) 40 - 60 mg/dL    LDL Cholesterol  122 0 - 130 mg/dL   POCT urinalysis dipstick, automated    Collection Time: 03/13/19  8:37 AM   Result Value Ref Range    Color Yellow Yellow, Straw, Dark Yellow, Dipika    Clarity, UA Clear Clear    Specific Gravity  1.015 1.005 - 1.030    pH, Urine 8.0 5.0 - 8.0    Leukocytes Negative Negative    Nitrite, UA Negative Negative    Protein, POC Negative Negative mg/dL    Glucose, UA Negative Negative, 1000 mg/dL (3+) mg/dL    Ketones, UA Negative Negative    Urobilinogen, UA Normal Normal    Bilirubin Negative Negative    Blood, UA Negative Negative   POC Occult Blood Stool    Collection Time: 03/13/19 12:27 PM   Result Value Ref Range    Fecal Occult Blood Negative Negative    Lot Number [id number]     Expiration Date 6/3/20     DEVELOPER LOT NUMBER [id number]     DEVELOPER EXPIRATION DATE 11/30/19     Positive Control Positive Positive    Negative Control Negative Negative

## 2019-04-26 DIAGNOSIS — F51.01 PRIMARY INSOMNIA: ICD-10-CM

## 2019-04-26 RX ORDER — ZOLPIDEM TARTRATE 10 MG/1
TABLET ORAL
Qty: 30 TABLET | Refills: 2 | Status: SHIPPED | OUTPATIENT
Start: 2019-04-26 | End: 2019-07-19 | Stop reason: SDUPTHER

## 2019-06-04 DIAGNOSIS — K21.9 GASTROESOPHAGEAL REFLUX DISEASE WITHOUT ESOPHAGITIS: ICD-10-CM

## 2019-06-05 RX ORDER — OMEPRAZOLE 20 MG/1
CAPSULE, DELAYED RELEASE ORAL
Qty: 30 CAPSULE | Refills: 5 | Status: SHIPPED | OUTPATIENT
Start: 2019-06-05 | End: 2019-07-19 | Stop reason: SDUPTHER

## 2019-07-19 ENCOUNTER — OFFICE VISIT (OUTPATIENT)
Dept: INTERNAL MEDICINE | Facility: CLINIC | Age: 59
End: 2019-07-19

## 2019-07-19 VITALS
HEART RATE: 88 BPM | BODY MASS INDEX: 37.45 KG/M2 | HEIGHT: 66 IN | SYSTOLIC BLOOD PRESSURE: 108 MMHG | WEIGHT: 233 LBS | OXYGEN SATURATION: 99 % | TEMPERATURE: 98.1 F | DIASTOLIC BLOOD PRESSURE: 60 MMHG

## 2019-07-19 DIAGNOSIS — K21.9 GASTROESOPHAGEAL REFLUX DISEASE WITHOUT ESOPHAGITIS: ICD-10-CM

## 2019-07-19 DIAGNOSIS — E03.9 ACQUIRED HYPOTHYROIDISM: ICD-10-CM

## 2019-07-19 DIAGNOSIS — Z79.899 LONG-TERM CURRENT USE OF HIGH RISK MEDICATION OTHER THAN ANTICOAGULANT: ICD-10-CM

## 2019-07-19 DIAGNOSIS — F51.01 PRIMARY INSOMNIA: Primary | ICD-10-CM

## 2019-07-19 DIAGNOSIS — E78.00 PURE HYPERCHOLESTEROLEMIA: ICD-10-CM

## 2019-07-19 LAB
AMPHET+METHAMPHET UR QL: NEGATIVE
AMPHETAMINES UR QL: NEGATIVE
BARBITURATES UR QL SCN: NEGATIVE
BENZODIAZ UR QL SCN: NEGATIVE
BUPRENORPHINE SERPL-MCNC: NEGATIVE NG/ML
CANNABINOIDS SERPL QL: NEGATIVE
COCAINE UR QL: NEGATIVE
METHADONE UR QL SCN: NEGATIVE
OPIATES UR QL: NEGATIVE
OXYCODONE UR QL SCN: NEGATIVE
PCP UR QL SCN: NEGATIVE
PROPOXYPH UR QL: NEGATIVE
TRICYCLICS UR QL SCN: NEGATIVE

## 2019-07-19 PROCEDURE — 99214 OFFICE O/P EST MOD 30 MIN: CPT | Performed by: FAMILY MEDICINE

## 2019-07-19 PROCEDURE — 80306 DRUG TEST PRSMV INSTRMNT: CPT | Performed by: FAMILY MEDICINE

## 2019-07-19 RX ORDER — ZOLPIDEM TARTRATE 10 MG/1
10 TABLET ORAL NIGHTLY PRN
Qty: 30 TABLET | Refills: 2 | Status: SHIPPED | OUTPATIENT
Start: 2019-07-19 | End: 2019-10-22 | Stop reason: SDUPTHER

## 2019-07-19 RX ORDER — PRAVASTATIN SODIUM 20 MG
20 TABLET ORAL
Qty: 30 TABLET | Refills: 5 | Status: SHIPPED | OUTPATIENT
Start: 2019-07-19 | End: 2020-01-06

## 2019-07-19 RX ORDER — LEVOTHYROXINE SODIUM 0.15 MG/1
150 TABLET ORAL DAILY
Qty: 30 TABLET | Refills: 5 | Status: SHIPPED | OUTPATIENT
Start: 2019-07-19 | End: 2020-01-21 | Stop reason: SDUPTHER

## 2019-07-19 RX ORDER — OMEPRAZOLE 20 MG/1
20 CAPSULE, DELAYED RELEASE ORAL DAILY
Qty: 30 CAPSULE | Refills: 5 | Status: SHIPPED | OUTPATIENT
Start: 2019-07-19 | End: 2020-01-21 | Stop reason: SDUPTHER

## 2019-07-19 NOTE — PROGRESS NOTES
"Wendy Lincoln is a 59 y.o. female.     Chief Complaint   Patient presents with   • Insomnia     f/u   • Hyperlipidemia   • Hypothyroidism       Visit Vitals  /60 (BP Location: Left arm, Cuff Size: Large Adult)   Pulse 88   Temp 98.1 °F (36.7 °C)   Ht 166.4 cm (65.5\")   Wt 106 kg (233 lb)   SpO2 99%   BMI 38.18 kg/m²         Hypothyroidism   This is a chronic problem. The current episode started more than 1 year ago. The problem occurs constantly. The problem has been unchanged. Associated symptoms include arthralgias and joint swelling. Pertinent negatives include no abdominal pain, anorexia (knees), change in bowel habit, chest pain, chills, congestion, coughing, diaphoresis, fatigue, fever, headaches, myalgias, nausea, neck pain, numbness, rash, sore throat, swollen glands, urinary symptoms, vertigo, visual change, vomiting or weakness. Nothing aggravates the symptoms. Treatments tried: thyroid. The treatment provided significant relief.   Hyperlipidemia   This is a chronic problem. The current episode started more than 1 year ago. The problem is controlled. Recent lipid tests were reviewed and are normal. Exacerbating diseases include hypothyroidism and obesity. She has no history of chronic renal disease, diabetes or liver disease. There are no known factors aggravating her hyperlipidemia. Pertinent negatives include no chest pain, focal sensory loss, focal weakness, leg pain, myalgias or shortness of breath. Current antihyperlipidemic treatment includes statins. The current treatment provides significant improvement of lipids. There are no compliance problems.  Risk factors for coronary artery disease include dyslipidemia, obesity, family history and post-menopausal.   Insomnia   This is a chronic problem. The current episode started more than 1 year ago. The problem occurs constantly. The problem has been unchanged. Associated symptoms include arthralgias and joint swelling. Pertinent " negatives include no abdominal pain, anorexia (knees), change in bowel habit, chest pain, chills, congestion, coughing, diaphoresis, fatigue, fever, headaches, myalgias, nausea, neck pain, numbness, rash, sore throat, swollen glands, urinary symptoms, vertigo, visual change, vomiting or weakness. Nothing aggravates the symptoms. Treatments tried: ambien. The treatment provided significant relief.      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     Pt had sleep study in distant past and insurance did not cover. Pt cannot have sleep study until  better.   The following portions of the patient's history were reviewed and updated as appropriate: allergies, current medications, past family history, past medical history, past social history, past surgical history and problem list.    Past Medical History:   Diagnosis Date   • Anxiety    • Calculus of kidney    • Carbuncle and furuncle of buttock 08/16/2012   • Chalazion    • GERD (gastroesophageal reflux disease)    • Goiter    • H/O bone density study 03/28/2018    nl   • Hyperlipidemia    • Hypothyroid    • Insomnia    • Persistent disorder of initiating or maintaining sleep    • Routine general medical examination at a health care facility 02/19/2013   • Routine general medical examination at a health care facility 02/16/2012   • Sacroiliac pain    • Sicca syndrome (CMS/HCC)    • Sleep apnea    • Visit for routine gyn exam 02/16/2012      Past Surgical History:   Procedure Laterality Date   • REPLACEMENT TOTAL KNEE BILATERAL     • TOTAL ABDOMINAL HYSTERECTOMY WITH SALPINGO OOPHORECTOMY Bilateral 2002      Family History   Problem Relation Age of Onset   • Thyroid disease Mother    • Memory loss Mother    • Alzheimer's disease Mother    • Other Mother         BCCA; Blood Clot   • Heart disease Father    • Atrial fibrillation Father    • Cirrhosis Father    • Depression Father    • Other Father          SCCA   • Alzheimer's disease Maternal Aunt    • Breast cancer Maternal Grandmother 80   • Ovarian cancer Neg Hx    • BRCA 1/2 Neg Hx       Social History     Socioeconomic History   • Marital status:      Spouse name: Not on file   • Number of children: Not on file   • Years of education: Not on file   • Highest education level: Not on file   Tobacco Use   • Smoking status: Former Smoker   • Smokeless tobacco: Never Used   • Tobacco comment: quit 30 + years   Substance and Sexual Activity   • Alcohol use: Yes     Alcohol/week: 3.0 oz     Types: 5 Glasses of wine per week     Comment: red wine   • Drug use: No   • Sexual activity: Yes     Partners: Male     Birth control/protection: Post-menopausal, Surgical      Allergies   Allergen Reactions   • Morphine And Related Swelling     Tongue swells, doesn't help pain   • Erythromycin Nausea Only   • Requip [Ropinirole Hcl] Nausea Only       Review of Systems   Constitutional: Negative.  Negative for chills, diaphoresis, fatigue and fever.   HENT: Positive for postnasal drip and rhinorrhea. Negative for congestion, ear pain, nosebleeds, sinus pressure, sneezing and sore throat.    Eyes: Negative.  Negative for redness and itching.   Respiratory: Negative.  Negative for cough, shortness of breath and wheezing.    Cardiovascular: Negative.  Negative for chest pain and palpitations.   Gastrointestinal: Negative.  Negative for abdominal pain, anorexia (knees), change in bowel habit, constipation, diarrhea, nausea and vomiting.   Endocrine: Negative.  Negative for cold intolerance and heat intolerance.   Genitourinary: Negative.  Negative for dysuria, frequency, hematuria and urgency.   Musculoskeletal: Positive for arthralgias and joint swelling. Negative for back pain, myalgias and neck pain.   Skin: Negative.  Negative for color change and rash.   Allergic/Immunologic: Negative.  Negative for environmental allergies.   Neurological: Negative.  Negative for dizziness,  vertigo, focal weakness, syncope, weakness, light-headedness, numbness and headaches.   Hematological: Negative.  Negative for adenopathy. Does not bruise/bleed easily.   Psychiatric/Behavioral: Negative for dysphoric mood. The patient has insomnia. The patient is not nervous/anxious.        Objective   Physical Exam   Constitutional: She is oriented to person, place, and time. She appears well-developed.   HENT:   Head: Normocephalic.   Right Ear: External ear normal.   Left Ear: External ear normal.   Nose: Nose normal.   Eyes: Conjunctivae, EOM and lids are normal. Pupils are equal, round, and reactive to light.   Neck: Trachea normal and normal range of motion. Neck supple. Carotid bruit is not present. No thyroid mass and no thyromegaly present.   Cardiovascular: Normal rate and regular rhythm.   No murmur heard.  Pulmonary/Chest: Effort normal and breath sounds normal. No respiratory distress. She has no decreased breath sounds. She has no wheezes. She has no rhonchi. She has no rales. She exhibits no tenderness.   Abdominal: Soft. Bowel sounds are normal. There is no tenderness.   Musculoskeletal: Normal range of motion.   Neurological: She is alert and oriented to person, place, and time.   Skin: Skin is warm and dry.   Psychiatric: She has a normal mood and affect. Her behavior is normal.   Nursing note and vitals reviewed.      Assessment/Plan   Ana was seen today for insomnia, hyperlipidemia and hypothyroidism.    Diagnoses and all orders for this visit:    Primary insomnia  -     zolpidem (AMBIEN) 10 MG tablet; Take 1 tablet by mouth At Night As Needed for Sleep. for sleep    Gastroesophageal reflux disease without esophagitis  -     omeprazole (priLOSEC) 20 MG capsule; Take 1 capsule by mouth Daily.    Acquired hypothyroidism  -     levothyroxine (SYNTHROID, LEVOTHROID) 150 MCG tablet; Take 1 tablet by mouth Daily.  -     TSH; Future  -     T4, Free; Future    Pure hypercholesterolemia  -      pravastatin (PRAVACHOL) 20 MG tablet; Take 1 tablet by mouth every night at bedtime.  -     Comprehensive Metabolic Panel; Future  -     Lipid Panel; Future    Long-term current use of high risk medication other than anticoagulant  -     Urine Drug Screen - Urine, Clean Catch  -     Zolpidem (Ambien), Serum; Future      Discussed Shingrix vaccine with pt,  that is currently available at the pharmacy.                  Current Outpatient Medications:   •  cetirizine (ZyrTEC) 10 MG tablet, Take 10 mg by mouth daily., Disp: , Rfl:   •  fluticasone (FLONASE) 50 MCG/ACT nasal spray, 2 sprays into each nostril Daily. Administer 2 sprays in each nostril for each dose., Disp: 16 g, Rfl: 5  •  levothyroxine (SYNTHROID, LEVOTHROID) 150 MCG tablet, Take 1 tablet by mouth Daily., Disp: 30 tablet, Rfl: 5  •  naproxen (NAPROSYN) 500 MG tablet, take 1 tablet by mouth every evening if needed for MILD PAIN, Disp: 30 tablet, Rfl: 5  •  omeprazole (priLOSEC) 20 MG capsule, Take 1 capsule by mouth Daily., Disp: 30 capsule, Rfl: 5  •  pravastatin (PRAVACHOL) 20 MG tablet, Take 1 tablet by mouth every night at bedtime., Disp: 30 tablet, Rfl: 5  •  zolpidem (AMBIEN) 10 MG tablet, Take 1 tablet by mouth At Night As Needed for Sleep. for sleep, Disp: 30 tablet, Rfl: 2    Return in about 3 months (around 10/19/2019), or if symptoms worsen or fail to improve, for Recheck.    [facility] report reviewed and is consistent #[id number]

## 2019-10-14 ENCOUNTER — LAB (OUTPATIENT)
Dept: INTERNAL MEDICINE | Facility: CLINIC | Age: 59
End: 2019-10-14

## 2019-10-14 DIAGNOSIS — E78.00 PURE HYPERCHOLESTEROLEMIA: ICD-10-CM

## 2019-10-14 DIAGNOSIS — E03.9 ACQUIRED HYPOTHYROIDISM: ICD-10-CM

## 2019-10-14 DIAGNOSIS — Z79.899 LONG-TERM CURRENT USE OF HIGH RISK MEDICATION OTHER THAN ANTICOAGULANT: ICD-10-CM

## 2019-10-14 LAB
ALBUMIN SERPL-MCNC: 4.2 G/DL (ref 3.5–5.2)
ALBUMIN/GLOB SERPL: 1.4 G/DL
ALP SERPL-CCNC: 78 U/L (ref 39–117)
ALT SERPL W P-5'-P-CCNC: 14 U/L (ref 1–33)
ANION GAP SERPL CALCULATED.3IONS-SCNC: 11.1 MMOL/L (ref 5–15)
AST SERPL-CCNC: 14 U/L (ref 1–32)
BILIRUB SERPL-MCNC: 0.5 MG/DL (ref 0.2–1.2)
BUN BLD-MCNC: 13 MG/DL (ref 6–20)
BUN/CREAT SERPL: 19.1 (ref 7–25)
CALCIUM SPEC-SCNC: 9 MG/DL (ref 8.6–10.5)
CHLORIDE SERPL-SCNC: 96 MMOL/L (ref 98–107)
CHOLEST SERPL-MCNC: 189 MG/DL (ref 0–200)
CO2 SERPL-SCNC: 26.9 MMOL/L (ref 22–29)
CREAT BLD-MCNC: 0.68 MG/DL (ref 0.57–1)
GFR SERPL CREATININE-BSD FRML MDRD: 89 ML/MIN/1.73
GLOBULIN UR ELPH-MCNC: 3.1 GM/DL
GLUCOSE BLD-MCNC: 90 MG/DL (ref 65–99)
HDLC SERPL-MCNC: 58 MG/DL (ref 40–60)
LDLC SERPL CALC-MCNC: 103 MG/DL (ref 0–100)
LDLC/HDLC SERPL: 1.78 {RATIO}
POTASSIUM BLD-SCNC: 4 MMOL/L (ref 3.5–5.2)
PROT SERPL-MCNC: 7.3 G/DL (ref 6–8.5)
SODIUM BLD-SCNC: 134 MMOL/L (ref 136–145)
T4 FREE SERPL-MCNC: 1.52 NG/DL (ref 0.93–1.7)
TRIGL SERPL-MCNC: 140 MG/DL (ref 0–150)
TSH SERPL DL<=0.05 MIU/L-ACNC: 2.67 UIU/ML (ref 0.27–4.2)
VLDLC SERPL-MCNC: 28 MG/DL (ref 5–40)

## 2019-10-14 PROCEDURE — 80061 LIPID PANEL: CPT | Performed by: FAMILY MEDICINE

## 2019-10-14 PROCEDURE — G0480 DRUG TEST DEF 1-7 CLASSES: HCPCS | Performed by: FAMILY MEDICINE

## 2019-10-14 PROCEDURE — 84439 ASSAY OF FREE THYROXINE: CPT | Performed by: FAMILY MEDICINE

## 2019-10-14 PROCEDURE — 84443 ASSAY THYROID STIM HORMONE: CPT | Performed by: FAMILY MEDICINE

## 2019-10-14 PROCEDURE — 80053 COMPREHEN METABOLIC PANEL: CPT | Performed by: FAMILY MEDICINE

## 2019-10-14 PROCEDURE — 36415 COLL VENOUS BLD VENIPUNCTURE: CPT

## 2019-10-18 DIAGNOSIS — F51.01 PRIMARY INSOMNIA: ICD-10-CM

## 2019-10-18 RX ORDER — ZOLPIDEM TARTRATE 10 MG/1
TABLET ORAL
Qty: 30 TABLET | Refills: 2 | OUTPATIENT
Start: 2019-10-18

## 2019-10-22 ENCOUNTER — OFFICE VISIT (OUTPATIENT)
Dept: INTERNAL MEDICINE | Facility: CLINIC | Age: 59
End: 2019-10-22

## 2019-10-22 VITALS
HEART RATE: 75 BPM | HEIGHT: 66 IN | BODY MASS INDEX: 37.38 KG/M2 | SYSTOLIC BLOOD PRESSURE: 126 MMHG | TEMPERATURE: 97.6 F | DIASTOLIC BLOOD PRESSURE: 78 MMHG | OXYGEN SATURATION: 94 % | WEIGHT: 232.6 LBS

## 2019-10-22 DIAGNOSIS — F51.01 PRIMARY INSOMNIA: ICD-10-CM

## 2019-10-22 PROCEDURE — 99213 OFFICE O/P EST LOW 20 MIN: CPT | Performed by: FAMILY MEDICINE

## 2019-10-22 RX ORDER — ZOLPIDEM TARTRATE 10 MG/1
10 TABLET ORAL NIGHTLY PRN
Qty: 30 TABLET | Refills: 2 | Status: SHIPPED | OUTPATIENT
Start: 2019-10-22 | End: 2020-01-21 | Stop reason: SDUPTHER

## 2019-10-22 NOTE — PROGRESS NOTES
"Wendy Lincoln is a 59 y.o. female.     Chief Complaint   Patient presents with   • Insomnia       Visit Vitals  /78 (BP Location: Left arm, Cuff Size: Large Adult)   Pulse 75   Temp 97.6 °F (36.4 °C)   Ht 166.4 cm (65.5\")   Wt 106 kg (232 lb 9.6 oz)   SpO2 94%   BMI 38.12 kg/m²         Insomnia   This is a chronic problem. The current episode started more than 1 year ago. The problem occurs constantly. The problem has been unchanged. Associated symptoms include arthralgias (hips and other joints). Pertinent negatives include no abdominal pain, anorexia, change in bowel habit, chest pain, chills, congestion, coughing, diaphoresis, fatigue, fever, headaches, joint swelling, myalgias, nausea, neck pain, numbness, rash, sore throat, swollen glands, urinary symptoms, vertigo, visual change, vomiting or weakness. Nothing aggravates the symptoms. Treatments tried: ambien. The treatment provided significant relief.        The following portions of the patient's history were reviewed and updated as appropriate: allergies, current medications, past family history, past medical history, past social history, past surgical history and problem list.    Past Medical History:   Diagnosis Date   • Anxiety    • Calculus of kidney    • Carbuncle and furuncle of buttock 08/16/2012   • Chalazion    • GERD (gastroesophageal reflux disease)    • Goiter    • H/O bone density study 03/28/2018    nl   • Hyperlipidemia    • Hypothyroid    • Insomnia    • Persistent disorder of initiating or maintaining sleep    • Routine general medical examination at a health care facility 02/19/2013   • Routine general medical examination at a health care facility 02/16/2012   • Sacroiliac pain    • Sicca syndrome (CMS/[facility])    • Sleep apnea    • Visit for routine gyn exam 02/16/2012      Past Surgical History:   Procedure Laterality Date   • REPLACEMENT TOTAL KNEE BILATERAL     • TOTAL ABDOMINAL HYSTERECTOMY WITH SALPINGO OOPHORECTOMY " Bilateral 2002      Family History   Problem Relation Age of Onset   • Thyroid disease Mother    • Memory loss Mother    • Alzheimer's disease Mother    • Other Mother         BCCA; Blood Clot   • Heart disease Father    • Atrial fibrillation Father    • Cirrhosis Father    • Depression Father    • Other Father         SCCA   • Alzheimer's disease Maternal Aunt    • Breast cancer Maternal Grandmother 80   • Ovarian cancer Neg Hx    • BRCA 1/2 Neg Hx       Social History     Socioeconomic History   • Marital status:      Spouse name: Not on file   • Number of children: Not on file   • Years of education: Not on file   • Highest education level: Not on file   Tobacco Use   • Smoking status: Former Smoker   • Smokeless tobacco: Never Used   • Tobacco comment: quit 30 + years   Substance and Sexual Activity   • Alcohol use: Yes     Alcohol/week: 3.0 oz     Types: 5 Glasses of wine per week     Comment: red wine   • Drug use: No   • Sexual activity: Yes     Partners: Male     Birth control/protection: Post-menopausal, Surgical      Allergies   Allergen Reactions   • Morphine And Related Swelling     Tongue swells, doesn't help pain   • Erythromycin Nausea Only   • Requip [Ropinirole Hcl] Nausea Only       Review of Systems   Constitutional: Negative.  Negative for chills, diaphoresis, fatigue and fever.   HENT: Positive for rhinorrhea. Negative for congestion, ear pain, nosebleeds, postnasal drip, sinus pressure, sneezing and sore throat.    Eyes: Negative.  Negative for redness and itching.   Respiratory: Negative.  Negative for cough, shortness of breath and wheezing.    Cardiovascular: Negative.  Negative for chest pain and palpitations.   Gastrointestinal: Negative.  Negative for abdominal pain, anorexia, change in bowel habit, constipation, diarrhea, nausea and vomiting.   Endocrine: Negative.  Negative for cold intolerance and heat intolerance.   Genitourinary: Negative.  Negative for dysuria, frequency,  hematuria and urgency.   Musculoskeletal: Positive for arthralgias (hips and other joints). Negative for back pain, joint swelling, myalgias and neck pain.   Skin: Negative.  Negative for color change and rash.   Allergic/Immunologic: Positive for environmental allergies.   Neurological: Negative.  Negative for dizziness, vertigo, syncope, weakness, light-headedness, numbness and headaches.   Hematological: Negative.  Negative for adenopathy. Does not bruise/bleed easily.   Psychiatric/Behavioral: Negative for dysphoric mood. The patient has insomnia. The patient is not nervous/anxious.        Objective   Physical Exam   Constitutional: She is oriented to person, place, and time. She appears well-developed.   HENT:   Head: Normocephalic.   Right Ear: External ear normal.   Left Ear: External ear normal.   Nose: Nose normal.   Eyes: Conjunctivae, EOM and lids are normal. Pupils are equal, round, and reactive to light.   Neck: Trachea normal and normal range of motion. Neck supple. Carotid bruit is not present. No thyroid mass and no thyromegaly present.   Cardiovascular: Normal rate and regular rhythm.   No murmur heard.  Pulmonary/Chest: Effort normal and breath sounds normal. No respiratory distress. She has no decreased breath sounds. She has no wheezes. She has no rhonchi. She has no rales. She exhibits no tenderness.   Abdominal: Soft. Bowel sounds are normal. There is no tenderness.   Musculoskeletal: Normal range of motion.   Neurological: She is alert and oriented to person, place, and time.   Skin: Skin is warm and dry.   Psychiatric: She has a normal mood and affect. Her behavior is normal.   Nursing note and vitals reviewed.      Assessment/Plan   Ana was seen today for insomnia.    Diagnoses and all orders for this visit:    Primary insomnia  -     zolpidem (AMBIEN) 10 MG tablet; Take 1 tablet by mouth At Night As Needed for Sleep. for sleep        Discussed Shingrix vaccine and tdap  with pt,  that  is currently available at the pharmacy.      Reviewed last lab with pt.            Current Outpatient Medications:   •  cetirizine (ZyrTEC) 10 MG tablet, Take 10 mg by mouth daily., Disp: , Rfl:   •  fluticasone (FLONASE) 50 MCG/ACT nasal spray, 2 sprays into each nostril Daily. Administer 2 sprays in each nostril for each dose., Disp: 16 g, Rfl: 5  •  levothyroxine (SYNTHROID, LEVOTHROID) 150 MCG tablet, Take 1 tablet by mouth Daily., Disp: 30 tablet, Rfl: 5  •  naproxen (NAPROSYN) 500 MG tablet, take 1 tablet by mouth every evening if needed for MILD PAIN, Disp: 30 tablet, Rfl: 5  •  omeprazole (priLOSEC) 20 MG capsule, Take 1 capsule by mouth Daily., Disp: 30 capsule, Rfl: 5  •  pravastatin (PRAVACHOL) 20 MG tablet, Take 1 tablet by mouth every night at bedtime., Disp: 30 tablet, Rfl: 5  •  zolpidem (AMBIEN) 10 MG tablet, Take 1 tablet by mouth At Night As Needed for Sleep. for sleep, Disp: 30 tablet, Rfl: 2    Return in about 3 months (around 1/22/2020), or if symptoms worsen or fail to improve, for Recheck, chronic conditions.    [facility] report reviewed and is consistent #[id number]    The patient has read and signed the [facility] Controlled Substance Contract.  I will continue to see patient for regular follow up appointments.  They are well controlled on their medication.  TOBIAS is updated every 3 months. The patient is aware of the potential for addiction and dependence.    Recent Results (from the past 336 hour(s))   Comprehensive Metabolic Panel    Collection Time: 10/14/19  8:00 AM   Result Value Ref Range    Glucose 90 65 - 99 mg/dL    BUN 13 6 - 20 mg/dL    Creatinine 0.68 0.57 - 1.00 mg/dL    Sodium 134 (L) 136 - 145 mmol/L    Potassium 4.0 3.5 - 5.2 mmol/L    Chloride 96 (L) 98 - 107 mmol/L    CO2 26.9 22.0 - 29.0 mmol/L    Calcium 9.0 8.6 - 10.5 mg/dL    Total Protein 7.3 6.0 - 8.5 g/dL    Albumin 4.20 3.50 - 5.20 g/dL    ALT (SGPT) 14 1 - 33 U/L    AST (SGOT) 14 1 - 32 U/L    Alkaline Phosphatase  78 39 - 117 U/L    Total Bilirubin 0.5 0.2 - 1.2 mg/dL    eGFR Non African Amer 89 >60 mL/min/1.73    Globulin 3.1 gm/dL    A/G Ratio 1.4 g/dL    BUN/Creatinine Ratio 19.1 7.0 - 25.0    Anion Gap 11.1 5.0 - 15.0 mmol/L   Lipid Panel    Collection Time: 10/14/19  8:00 AM   Result Value Ref Range    Total Cholesterol 189 0 - 200 mg/dL    Triglycerides 140 0 - 150 mg/dL    HDL Cholesterol 58 40 - 60 mg/dL    LDL Cholesterol  103 (H) 0 - 100 mg/dL    VLDL Cholesterol 28 5 - 40 mg/dL    LDL/HDL Ratio 1.78    TSH    Collection Time: 10/14/19  8:00 AM   Result Value Ref Range    TSH 2.670 0.270 - 4.200 uIU/mL   T4, Free    Collection Time: 10/14/19  8:00 AM   Result Value Ref Range    Free T4 1.52 0.93 - 1.70 ng/dL

## 2019-11-04 LAB — ZOLPIDEM: 84 NG/ML

## 2020-01-03 DIAGNOSIS — E78.00 PURE HYPERCHOLESTEROLEMIA: ICD-10-CM

## 2020-01-06 RX ORDER — PRAVASTATIN SODIUM 20 MG
TABLET ORAL
Qty: 30 TABLET | Refills: 0 | Status: SHIPPED | OUTPATIENT
Start: 2020-01-06 | End: 2020-01-20

## 2020-01-18 DIAGNOSIS — E78.00 PURE HYPERCHOLESTEROLEMIA: ICD-10-CM

## 2020-01-20 RX ORDER — PRAVASTATIN SODIUM 20 MG
TABLET ORAL
Qty: 30 TABLET | Refills: 0 | Status: SHIPPED | OUTPATIENT
Start: 2020-01-20 | End: 2020-01-21 | Stop reason: SDUPTHER

## 2020-01-21 ENCOUNTER — OFFICE VISIT (OUTPATIENT)
Dept: INTERNAL MEDICINE | Facility: CLINIC | Age: 60
End: 2020-01-21

## 2020-01-21 VITALS
TEMPERATURE: 98 F | BODY MASS INDEX: 37.9 KG/M2 | SYSTOLIC BLOOD PRESSURE: 122 MMHG | OXYGEN SATURATION: 98 % | HEART RATE: 82 BPM | WEIGHT: 235.8 LBS | DIASTOLIC BLOOD PRESSURE: 82 MMHG | HEIGHT: 66 IN

## 2020-01-21 DIAGNOSIS — F51.01 PRIMARY INSOMNIA: Primary | ICD-10-CM

## 2020-01-21 DIAGNOSIS — R00.2 PALPITATIONS: ICD-10-CM

## 2020-01-21 DIAGNOSIS — E78.00 PURE HYPERCHOLESTEROLEMIA: ICD-10-CM

## 2020-01-21 DIAGNOSIS — Z79.899 ENCOUNTER FOR LONG-TERM (CURRENT) USE OF MEDICATIONS: ICD-10-CM

## 2020-01-21 DIAGNOSIS — K21.9 GASTROESOPHAGEAL REFLUX DISEASE WITHOUT ESOPHAGITIS: ICD-10-CM

## 2020-01-21 DIAGNOSIS — E03.9 ACQUIRED HYPOTHYROIDISM: ICD-10-CM

## 2020-01-21 LAB
ALBUMIN SERPL-MCNC: 3.9 G/DL (ref 3.5–5.2)
ALBUMIN/GLOB SERPL: 1.3 G/DL
ALP SERPL-CCNC: 77 U/L (ref 39–117)
ALT SERPL W P-5'-P-CCNC: 12 U/L (ref 1–33)
AMPHET+METHAMPHET UR QL: NEGATIVE
AMPHETAMINES UR QL: NEGATIVE
ANION GAP SERPL CALCULATED.3IONS-SCNC: 11.8 MMOL/L (ref 5–15)
AST SERPL-CCNC: 14 U/L (ref 1–32)
BARBITURATES UR QL SCN: NEGATIVE
BENZODIAZ UR QL SCN: NEGATIVE
BILIRUB SERPL-MCNC: 0.4 MG/DL (ref 0.2–1.2)
BUN BLD-MCNC: 11 MG/DL (ref 6–20)
BUN/CREAT SERPL: 18 (ref 7–25)
BUPRENORPHINE SERPL-MCNC: NEGATIVE NG/ML
CALCIUM SPEC-SCNC: 9 MG/DL (ref 8.6–10.5)
CANNABINOIDS SERPL QL: NEGATIVE
CHLORIDE SERPL-SCNC: 100 MMOL/L (ref 98–107)
CHOLEST SERPL-MCNC: 175 MG/DL (ref 0–200)
CO2 SERPL-SCNC: 25.2 MMOL/L (ref 22–29)
COCAINE UR QL: NEGATIVE
CREAT BLD-MCNC: 0.61 MG/DL (ref 0.57–1)
GFR SERPL CREATININE-BSD FRML MDRD: 101 ML/MIN/1.73
GLOBULIN UR ELPH-MCNC: 3 GM/DL
GLUCOSE BLD-MCNC: 101 MG/DL (ref 65–99)
HDLC SERPL-MCNC: 58 MG/DL (ref 40–60)
LDLC SERPL CALC-MCNC: 95 MG/DL (ref 0–100)
LDLC/HDLC SERPL: 1.63 {RATIO}
METHADONE UR QL SCN: NEGATIVE
OPIATES UR QL: NEGATIVE
OXYCODONE UR QL SCN: NEGATIVE
PCP UR QL SCN: NEGATIVE
POTASSIUM BLD-SCNC: 4 MMOL/L (ref 3.5–5.2)
PROPOXYPH UR QL: NEGATIVE
PROT SERPL-MCNC: 6.9 G/DL (ref 6–8.5)
SODIUM BLD-SCNC: 137 MMOL/L (ref 136–145)
T4 FREE SERPL-MCNC: 1.47 NG/DL (ref 0.93–1.7)
TRICYCLICS UR QL SCN: NEGATIVE
TRIGL SERPL-MCNC: 111 MG/DL (ref 0–150)
TSH SERPL DL<=0.05 MIU/L-ACNC: 1.74 UIU/ML (ref 0.27–4.2)
VLDLC SERPL-MCNC: 22.2 MG/DL (ref 5–40)

## 2020-01-21 PROCEDURE — 80061 LIPID PANEL: CPT | Performed by: FAMILY MEDICINE

## 2020-01-21 PROCEDURE — 93000 ELECTROCARDIOGRAM COMPLETE: CPT | Performed by: FAMILY MEDICINE

## 2020-01-21 PROCEDURE — 80306 DRUG TEST PRSMV INSTRMNT: CPT | Performed by: FAMILY MEDICINE

## 2020-01-21 PROCEDURE — 84439 ASSAY OF FREE THYROXINE: CPT | Performed by: FAMILY MEDICINE

## 2020-01-21 PROCEDURE — 84443 ASSAY THYROID STIM HORMONE: CPT | Performed by: FAMILY MEDICINE

## 2020-01-21 PROCEDURE — 99214 OFFICE O/P EST MOD 30 MIN: CPT | Performed by: FAMILY MEDICINE

## 2020-01-21 PROCEDURE — 80053 COMPREHEN METABOLIC PANEL: CPT | Performed by: FAMILY MEDICINE

## 2020-01-21 RX ORDER — OMEPRAZOLE 20 MG/1
20 CAPSULE, DELAYED RELEASE ORAL DAILY
Qty: 30 CAPSULE | Refills: 5 | Status: SHIPPED | OUTPATIENT
Start: 2020-01-21 | End: 2020-08-10

## 2020-01-21 RX ORDER — PRAVASTATIN SODIUM 20 MG
20 TABLET ORAL
Qty: 30 TABLET | Refills: 5 | Status: SHIPPED | OUTPATIENT
Start: 2020-01-21 | End: 2020-09-10 | Stop reason: SDUPTHER

## 2020-01-21 RX ORDER — ZOLPIDEM TARTRATE 10 MG/1
10 TABLET ORAL NIGHTLY PRN
Qty: 30 TABLET | Refills: 2 | Status: SHIPPED | OUTPATIENT
Start: 2020-01-21 | End: 2020-03-31 | Stop reason: SDUPTHER

## 2020-01-21 RX ORDER — LEVOTHYROXINE SODIUM 0.15 MG/1
150 TABLET ORAL DAILY
Qty: 30 TABLET | Refills: 5 | Status: SHIPPED | OUTPATIENT
Start: 2020-01-21 | End: 2020-02-03 | Stop reason: SDUPTHER

## 2020-01-21 NOTE — PROGRESS NOTES
"Wendy Lincoln is a 59 y.o. female.     Chief Complaint   Patient presents with   • Insomnia     f/u [facility] 10/22/19 [facility] 7/19/19       Visit Vitals  /82 (BP Location: Right arm, Cuff Size: Large Adult)   Pulse 82   Temp 98 °F (36.7 °C)   Ht 166.4 cm (65.5\")   Wt 107 kg (235 lb 12.8 oz)   SpO2 98%   BMI 38.64 kg/m²         Insomnia   This is a chronic problem. The problem occurs constantly. The problem has been unchanged. Associated symptoms include abdominal pain (epigastric), arthralgias, congestion, joint swelling and myalgias. Pertinent negatives include no anorexia, change in bowel habit, chest pain, chills, coughing, diaphoresis, fatigue, fever, headaches, nausea, neck pain, numbness, rash, sore throat, swollen glands, urinary symptoms, vertigo, visual change, vomiting or weakness. Nothing aggravates the symptoms. Treatments tried: ambien. The treatment provided significant relief.   Hyperlipidemia   This is a chronic problem. The current episode started more than 1 year ago. The problem is controlled. Recent lipid tests were reviewed and are normal. Exacerbating diseases include hypothyroidism and obesity. She has no history of chronic renal disease, diabetes, liver disease or nephrotic syndrome. There are no known factors aggravating her hyperlipidemia. Associated symptoms include myalgias. Pertinent negatives include no chest pain, focal sensory loss, focal weakness, leg pain or shortness of breath. Current antihyperlipidemic treatment includes statins. The current treatment provides significant improvement of lipids. There are no compliance problems.  Risk factors for coronary artery disease include dyslipidemia, family history and obesity.   Hypothyroidism   This is a chronic problem. The current episode started more than 1 year ago. The problem occurs constantly. The problem has been unchanged. Associated symptoms include abdominal pain (epigastric), arthralgias, congestion, joint " swelling and myalgias. Pertinent negatives include no anorexia, change in bowel habit, chest pain, chills, coughing, diaphoresis, fatigue, fever, headaches, nausea, neck pain, numbness, rash, sore throat, swollen glands, urinary symptoms, vertigo, visual change, vomiting or weakness. Nothing aggravates the symptoms. Treatments tried: thyroid. The treatment provided significant relief.   Palpitations    This is a new problem. The current episode started in the past 7 days. The problem occurs rarely. The problem has been resolved. Nothing aggravates the symptoms. Pertinent negatives include no anxiety, chest fullness, chest pain, coughing, diaphoresis, dizziness, fever, irregular heartbeat, malaise/fatigue, nausea, near-syncope, numbness, shortness of breath, syncope, vomiting or weakness. She has tried nothing for the symptoms. The treatment provided no relief. Risk factors include family history. Her past medical history is significant for anxiety. There is no history of anemia, drug use, heart disease, hyperthyroidism or a valve disorder.      Pt had heart racing a few days ago. Sister has atrial fibrillation.   Pt gets epigastric pain, that she is unsure of. Pt has stopped diet coke and decreased wine.     The following portions of the patient's history were reviewed and updated as appropriate: allergies, current medications, past family history, past medical history, past social history, past surgical history and problem list.    Past Medical History:   Diagnosis Date   • Anxiety    • Calculus of kidney    • Carbuncle and furuncle of buttock 08/16/2012   • Chalazion    • GERD (gastroesophageal reflux disease)    • Goiter    • H/O bone density study 03/28/2018    nl   • Hyperlipidemia    • Hypothyroid    • Insomnia    • Persistent disorder of initiating or maintaining sleep    • Routine general medical examination at a health care facility 02/19/2013   • Routine general medical examination at a health care  facility 02/16/2012   • Sacroiliac pain    • Sicca syndrome (CMS/HCC)    • Sleep apnea    • Visit for routine gyn exam 02/16/2012      Past Surgical History:   Procedure Laterality Date   • REPLACEMENT TOTAL KNEE BILATERAL     • TOTAL ABDOMINAL HYSTERECTOMY WITH SALPINGO OOPHORECTOMY Bilateral 2002      Family History   Problem Relation Age of Onset   • Thyroid disease Mother    • Memory loss Mother    • Alzheimer's disease Mother    • Other Mother         BCCA; Blood Clot   • Heart disease Father    • Atrial fibrillation Father    • Cirrhosis Father    • Depression Father    • Other Father         SCCA   • Alzheimer's disease Maternal Aunt    • Breast cancer Maternal Grandmother 80   • Atrial fibrillation Sister    • Ovarian cancer Neg Hx    • BRCA 1/2 Neg Hx       Social History     Socioeconomic History   • Marital status:      Spouse name: Not on file   • Number of children: Not on file   • Years of education: Not on file   • Highest education level: Not on file   Tobacco Use   • Smoking status: Former Smoker   • Smokeless tobacco: Never Used   • Tobacco comment: quit 30 + years   Substance and Sexual Activity   • Alcohol use: Yes     Alcohol/week: 5.0 standard drinks     Types: 5 Glasses of wine per week     Comment: red wine   • Drug use: No   • Sexual activity: Yes     Partners: Male     Birth control/protection: Post-menopausal, Surgical      Allergies   Allergen Reactions   • Morphine And Related Swelling     Tongue swells, doesn't help pain   • Erythromycin Nausea Only   • Requip [Ropinirole Hcl] Nausea Only       Review of Systems   Constitutional: Negative.  Negative for chills, diaphoresis, fatigue, fever and malaise/fatigue.   HENT: Positive for congestion, postnasal drip and rhinorrhea. Negative for ear pain, nosebleeds, sinus pressure, sneezing and sore throat.    Eyes: Negative.  Negative for redness and itching.   Respiratory: Negative.  Negative for cough, shortness of breath and wheezing.     Cardiovascular: Positive for palpitations. Negative for chest pain, syncope and near-syncope.   Gastrointestinal: Positive for abdominal pain (epigastric). Negative for anorexia, change in bowel habit, constipation, diarrhea, nausea and vomiting.   Endocrine: Negative.  Negative for cold intolerance and heat intolerance.   Genitourinary: Negative.  Negative for dysuria, frequency, hematuria and urgency.   Musculoskeletal: Positive for arthralgias, joint swelling and myalgias. Negative for back pain and neck pain.   Skin: Negative.  Negative for color change and rash.   Allergic/Immunologic: Positive for environmental allergies.   Neurological: Negative.  Negative for dizziness, vertigo, focal weakness, syncope, weakness, light-headedness, numbness and headaches.   Hematological: Negative.  Negative for adenopathy. Does not bruise/bleed easily.   Psychiatric/Behavioral: Positive for sleep disturbance. Negative for dysphoric mood. The patient has insomnia. The patient is not nervous/anxious.        Objective   Physical Exam   Constitutional: She is oriented to person, place, and time. She appears well-developed.   HENT:   Head: Normocephalic.   Right Ear: External ear normal.   Left Ear: External ear normal.   Nose: Nose normal.   Eyes: Pupils are equal, round, and reactive to light. Conjunctivae, EOM and lids are normal.   Neck: Trachea normal and normal range of motion. Neck supple. Carotid bruit is not present. No thyroid mass and no thyromegaly present.   Cardiovascular: Normal rate and regular rhythm.   No murmur heard.  Pulmonary/Chest: Effort normal and breath sounds normal. No respiratory distress. She has no decreased breath sounds. She has no wheezes. She has no rhonchi. She has no rales. She exhibits no tenderness.   Abdominal: Soft. Bowel sounds are normal. There is no tenderness.   Musculoskeletal: Normal range of motion.   Neurological: She is alert and oriented to person, place, and time.   Skin: Skin  is warm and dry.   Psychiatric: She has a normal mood and affect. Her behavior is normal.   Nursing note and vitals reviewed.        Assessment/Plan   Ana was seen today for insomnia.    Diagnoses and all orders for this visit:    Primary insomnia  -     zolpidem (AMBIEN) 10 MG tablet; Take 1 tablet by mouth At Night As Needed for Sleep. for sleep    Encounter for long-term (current) use of medications  -     Urine Drug Screen - Urine, Clean Catch    Pure hypercholesterolemia  -     pravastatin (PRAVACHOL) 20 MG tablet; Take 1 tablet by mouth every night at bedtime.  -     Comprehensive Metabolic Panel  -     Lipid Panel    Gastroesophageal reflux disease without esophagitis  -     omeprazole (priLOSEC) 20 MG capsule; Take 1 capsule by mouth Daily.    Acquired hypothyroidism  -     levothyroxine (SYNTHROID, LEVOTHROID) 150 MCG tablet; Take 1 tablet by mouth Daily.  -     TSH  -     T4, Free    Palpitations  -     Holter monitor - 48 hour; Future  -     ECG 12 Lead                   Current Outpatient Medications:   •  cetirizine (ZyrTEC) 10 MG tablet, Take 10 mg by mouth daily., Disp: , Rfl:   •  fluticasone (FLONASE) 50 MCG/ACT nasal spray, 2 sprays into each nostril Daily. Administer 2 sprays in each nostril for each dose., Disp: 16 g, Rfl: 5  •  levothyroxine (SYNTHROID, LEVOTHROID) 150 MCG tablet, Take 1 tablet by mouth Daily., Disp: 30 tablet, Rfl: 5  •  naproxen (NAPROSYN) 500 MG tablet, take 1 tablet by mouth every evening if needed for MILD PAIN, Disp: 30 tablet, Rfl: 5  •  omeprazole (priLOSEC) 20 MG capsule, Take 1 capsule by mouth Daily., Disp: 30 capsule, Rfl: 5  •  pravastatin (PRAVACHOL) 20 MG tablet, Take 1 tablet by mouth every night at bedtime., Disp: 30 tablet, Rfl: 5  •  zolpidem (AMBIEN) 10 MG tablet, Take 1 tablet by mouth At Night As Needed for Sleep. for sleep, Disp: 30 tablet, Rfl: 2    Return in about 3 months (around 4/21/2020), or if symptoms worsen or fail to improve, for  Kassi.    [facility] report reviewed and is consistent #[id number]      ECG 12 Lead  Date/Time: 1/21/2020 8:56 AM  Performed by: Bryanna Beckwith MD  Authorized by: Bryanna Beckwith MD   Comparison: compared with previous ECG from 9/23/2015  Similar to previous ECG  Rhythm: sinus rhythm  Rate: normal  BPM: 74  Conduction: conduction normal  ST Segments: ST segments normal  T Waves: T waves normal  QRS axis: normal (P, R, T: 64, -4, 30)  Other findings: low voltage    Clinical impression: abnormal EKG  Comments: Possible LVH  AZ int 149 ms  QRS dur 98 ms  QT/QTc 385/413 ms

## 2020-02-01 DIAGNOSIS — E03.9 ACQUIRED HYPOTHYROIDISM: ICD-10-CM

## 2020-02-03 ENCOUNTER — TELEPHONE (OUTPATIENT)
Dept: INTERNAL MEDICINE | Facility: CLINIC | Age: 60
End: 2020-02-03

## 2020-02-03 DIAGNOSIS — E03.9 ACQUIRED HYPOTHYROIDISM: ICD-10-CM

## 2020-02-03 RX ORDER — NAPROXEN 500 MG/1
TABLET ORAL
Qty: 30 TABLET | Refills: 5 | Status: SHIPPED | OUTPATIENT
Start: 2020-02-03 | End: 2020-07-23

## 2020-02-03 RX ORDER — LEVOTHYROXINE SODIUM 0.15 MG/1
TABLET ORAL
Qty: 30 TABLET | Refills: 5 | OUTPATIENT
Start: 2020-02-03

## 2020-02-03 RX ORDER — LEVOTHYROXINE SODIUM 0.15 MG/1
150 TABLET ORAL DAILY
Qty: 30 TABLET | Refills: 5 | Status: SHIPPED | OUTPATIENT
Start: 2020-02-03 | End: 2020-07-24

## 2020-02-03 NOTE — TELEPHONE ENCOUNTER
Pt called and stated that the [facility] pharmacy did not get the refill on levothyroxine (SYNTHROID, LEVOTHROID) 150 MCG tablet on 1/21/20 the patient is requesting a refill.    Also pt needs a refill on naproxen (NAPROSYN) 500 MG tablet.    [facility]

## 2020-02-11 ENCOUNTER — TRANSCRIBE ORDERS (OUTPATIENT)
Dept: ADMINISTRATIVE | Facility: HOSPITAL | Age: 60
End: 2020-02-11

## 2020-02-11 DIAGNOSIS — Z12.31 VISIT FOR SCREENING MAMMOGRAM: Primary | ICD-10-CM

## 2020-03-12 ENCOUNTER — OFFICE VISIT (OUTPATIENT)
Dept: INTERNAL MEDICINE | Facility: CLINIC | Age: 60
End: 2020-03-12

## 2020-03-12 VITALS
OXYGEN SATURATION: 98 % | SYSTOLIC BLOOD PRESSURE: 126 MMHG | TEMPERATURE: 97.8 F | HEIGHT: 66 IN | DIASTOLIC BLOOD PRESSURE: 80 MMHG | RESPIRATION RATE: 18 BRPM | WEIGHT: 238 LBS | HEART RATE: 78 BPM | BODY MASS INDEX: 38.25 KG/M2

## 2020-03-12 DIAGNOSIS — F51.01 PRIMARY INSOMNIA: ICD-10-CM

## 2020-03-12 DIAGNOSIS — E66.01 MORBID OBESITY (HCC): ICD-10-CM

## 2020-03-12 DIAGNOSIS — E78.00 PURE HYPERCHOLESTEROLEMIA: ICD-10-CM

## 2020-03-12 DIAGNOSIS — E03.9 ACQUIRED HYPOTHYROIDISM: Primary | ICD-10-CM

## 2020-03-12 PROCEDURE — 99213 OFFICE O/P EST LOW 20 MIN: CPT | Performed by: NURSE PRACTITIONER

## 2020-03-12 NOTE — PROGRESS NOTES
Ana Lincoln presents today for follow up.    CHIEF COMPLAINT:  Hypothyroidism; Hyperlipidemia; and Insomnia     HPI     Reviewed     HYPOTHYROID  Chronic. Treated with levothyroxine 150mcg daily   Patient takes medication at nighttime with her other nighttime meds. She does have an issue with insomnia- I discussed with patient switching her levothyroxine to taking it in the morning on an empty stomach with a full glass of water- she will try this.     Lab Results   Component Value Date    TSH 1.740 01/21/2020     Lab Results   Component Value Date    FREET4 1.47 01/21/2020     HLD  Chronic. Treated with pravastatin 20mg daily  Patient denies side effects of myalgia.  Latest lipid panel has REMAINED STABLE when compared to previous labs.  Liver enzymes are NORMAL   Patient has the following risk factors: sedentary lifestyle, obesity  LIPID PANEL  Lab Results   Component Value Date    CHOL 175 01/21/2020    CHOL 189 10/14/2019    CHOL 194 01/04/2019    CHLPL 230 (H) 05/26/2016     Lab Results   Component Value Date    TRIG 111 01/21/2020    TRIG 140 10/14/2019    TRIG 153 (H) 01/04/2019     Lab Results   Component Value Date    HDL 58 01/21/2020    HDL 58 10/14/2019    HDL 64 (H) 01/04/2019     Lab Results   Component Value Date    LDL 95 01/21/2020     (H) 10/14/2019     01/04/2019     LFT's  Lab Results   Component Value Date    ALT 12 01/21/2020    AST 14 01/21/2020     Insomnia  Chronic, treated with Ambien 10mg daily   Sleeps well     Review of Systems   Constitutional: Positive for fatigue. Negative for chills, diaphoresis, fever and unexpected weight loss.   Eyes: Negative for blurred vision and visual disturbance.   Respiratory: Negative for cough, chest tightness and shortness of breath.    Cardiovascular: Negative for chest pain, palpitations and leg swelling.   Gastrointestinal: Negative for abdominal pain, constipation, diarrhea, nausea and vomiting.   Endocrine: Negative for cold  "intolerance and heat intolerance.   Musculoskeletal: Positive for arthralgias. Negative for neck stiffness.   Skin: Negative for rash.   Neurological: Negative for dizziness, light-headedness and headache.   Psychiatric/Behavioral: Positive for stress. Negative for sleep disturbance and depressed mood. The patient is not nervous/anxious.       The following portions of the patient's history were reviewed and updated as appropriate: allergies, current medications, past family history, past medical history, past social history, past surgical history and problem list.    Visit Vitals  /80   Pulse 78   Temp 97.8 °F (36.6 °C)   Resp 18   Ht 166.4 cm (65.5\")   Wt 108 kg (238 lb)   SpO2 98%   BMI 39.00 kg/m²      Physical Exam   Constitutional: She is oriented to person, place, and time. She appears well-developed and well-nourished. She is cooperative.  Non-toxic appearance. She does not have a sickly appearance. She does not appear ill. No distress.   HENT:   Head: Normocephalic.   Right Ear: Hearing normal.   Left Ear: Hearing normal.   Eyes: Pupils are equal, round, and reactive to light. Conjunctivae are normal.   Cardiovascular: Normal rate, regular rhythm and normal heart sounds.   Pulmonary/Chest: Effort normal and breath sounds normal. No respiratory distress. She has no wheezes.   Neurological: She is alert and oriented to person, place, and time. She has normal strength.   Skin: Skin is warm, dry and intact. No rash noted. She is not diaphoretic.   Psychiatric: She has a normal mood and affect. Her speech is normal and behavior is normal. Judgment and thought content normal.   Vitals reviewed.    ASSESSMENT/PLAN  Diagnoses and all orders for this visit:    1. Acquired hypothyroidism (Primary)  Stable.  Continue taking medication as prescribed.  Side effects discussed.  Directed patient to take the medication in the morning, with water only and on an empty stomach.      2. Pure hypercholesterolemia  Stable. "  Continue taking pravastatin as prescribed.  Encouraged patient to maintain a low cholesterol/DASH diet, increase aerobic exercise as tolerated, decrease alcohol, increase fiber intake, limit sodium intake to no more than 1,500mg/day, increase omega-3 fatty acids, and maintain medication compliance.    3. Primary insomnia  Continue Ambien as needed.  Try switching synthroid to AM.    4. Morbid obesity (CMS/HCC)  Maintain healthy diet - try avoiding gluten and nightshade vegetables for arthralgias.     Return for Annual.  Discussed the nature of the medical condition(s) risks, complications, management, safe and proper use of medications. Encouraged medication compliance and the importance of keeping scheduled follow up appointments with me and any other providers.  Patient instructed to follow up with our office for results on any labs/imaging ordered during this visit.  Patient verbalizes understanding and agrees to treatment plan.

## 2020-03-12 NOTE — PATIENT INSTRUCTIONS
Thyroid support: Iodine and Selenium  You can find selenium in 1-2 [place] [employer] every day  Iodine is in [employer]     Mindfulness-Based Stress Reduction  Mindfulness-based stress reduction (MBSR) is a program that helps people learn to practice mindfulness. Mindfulness is the practice of intentionally paying attention to the present moment. It can be learned and practiced through techniques such as education, breathing exercises, meditation, and yoga. MBSR includes several mindfulness techniques in one program.  MBSR works best when you understand the treatment, are willing to try new things, and can commit to spending time practicing what you learn. MBSR training may include learning about:  · How your emotions, thoughts, and reactions affect your body.  · New ways to respond to things that cause negative thoughts to start (triggers).  · How to notice your thoughts and let go of them.  · Practicing awareness of everyday things that you normally do without thinking.  · The techniques and goals of different types of meditation.  What are the benefits of MBSR?  MBSR can have many benefits, which include helping you to:  · Develop self-awareness. This refers to knowing and understanding yourself.  · Learn skills and attitudes that help you to participate in your own health care.  · Learn new ways to care for yourself.  · Be more accepting about how things are, and let things go.  · Be less judgmental and approach things with an open mind.  · Be patient with yourself and trust yourself more.  MBSR has also been shown to:  · Reduce negative emotions, such as depression and anxiety.  · Improve memory and focus.  · Change how you sense and approach pain.  · Boost your body's ability to fight infections.  · Help you connect better with other people.  · Improve your sense of well-being.  Follow these instructions at home:    · Find a local in-person or online MBSR program.  · Set aside some time regularly for mindfulness  practice.  · Find a mindfulness practice that works best for you. This may include one or more of the following:  ? Meditation. Meditation involves focusing your mind on a certain thought or activity.  ? Breathing awareness exercises. These help you to stay present by focusing on your breath.  ? Body scan. For this practice, you lie down and pay attention to each part of your body from head to toe. You can identify tension and soreness and intentionally relax parts of your body.  ? Yoga. Yoga involves stretching and breathing, and it can improve your ability to move and be flexible. It can also provide an experience of testing your body's limits, which can help you release stress.  ? Mindful eating. This way of eating involves focusing on the taste, texture, color, and smell of each bite of food. Because this slows down eating and helps you feel full sooner, it can be an important part of a weight-loss plan.  · Find a podcast or recording that provides guidance for breathing awareness, body scan, or meditation exercises. You can listen to these any time when you have a free moment to rest without distractions.  · Follow your treatment plan as told by your health care provider. This may include taking regular medicines and making changes to your diet or lifestyle as recommended.  How to practice mindfulness  To do a basic awareness exercise:  · Find a comfortable place to sit.  · Pay attention to the present moment. Observe your thoughts, feelings, and surroundings just as they are.  · Avoid placing judgment on yourself, your feelings, or your surroundings. Make note of any judgment that comes up, and let it go.  · Your mind may wander, and that is okay. Make note of when your thoughts drift, and return your attention to the present moment.  To do basic mindfulness meditation:  · Find a comfortable place to sit. This may include a stable chair or a firm floor cushion.  ? Sit upright with your back straight. Let your  arms fall next to your side with your hands resting on your legs.  ? If sitting in a chair, rest your feet flat on the floor.  ? If sitting on a cushion, cross your legs in front of you.  · Keep your head in a neutral position with your chin dropped slightly. Relax your jaw and rest the tip of your tongue on the roof of your mouth. Drop your gaze to the floor. You can close your eyes if you like.  · Breathe normally and pay attention to your breath. Feel the air moving in and out of your nose. Feel your belly expanding and relaxing with each breath.  · Your mind may wander, and that is okay. Make note of when your thoughts drift, and return your attention to your breath.  · Avoid placing judgment on yourself, your feelings, or your surroundings. Make note of any judgment or feelings that come up, let them go, and bring your attention back to your breath.  · When you are ready, lift your gaze or open your eyes. Pay attention to how your body feels after the meditation.  Where to find more information  You can find more information about MBSR from:  · Your health care provider.  · Community-based meditation centers or programs.  · Programs offered near you.  Summary  · Mindfulness-based stress reduction (MBSR) is a program that teaches you how to intentionally pay attention to the present moment. It is used with other treatments to help you cope better with daily stress, emotions, and pain.  · MBSR focuses on developing self-awareness, which allows you to respond to life stress without judgment or negative emotions.  · MBSR programs may involve learning different mindfulness practices, such as breathing exercises, meditation, yoga, body scan, or mindful eating. Find a mindfulness practice that works best for you, and set aside time for it on a regular basis.  This information is not intended to replace advice given to you by your health care provider. Make sure you discuss any questions you have with your health care  provider.  Document Released: 04/26/2018 Document Revised: 04/26/2018 Document Reviewed: 04/26/2018  [employer] Interactive Patient Education © 2020 [employer]      Food Choices for Gastroesophageal Reflux Disease, Adult  When you have gastroesophageal reflux disease (GERD), the foods you eat and your eating habits are very important. Choosing the right foods can help ease the discomfort of GERD. Consider working with a diet and nutrition specialist (dietitian) to help you make healthy food choices.  What general guidelines should I follow?    Eating plan  · Choose healthy foods low in fat, such as fruits, vegetables, whole grains, low-fat dairy products, and lean meat, fish, and poultry.  · Eat frequent, small meals instead of three large meals each day. Eat your meals slowly, in a relaxed setting. Avoid bending over or lying down until 2-3 hours after eating.  · Limit high-fat foods such as fatty meats or fried foods.  · Limit your intake of oils, butter, and shortening to less than 8 teaspoons each day.  · Avoid the following:  ? Foods that cause symptoms. These may be different for different people. Keep a food diary to keep track of foods that cause symptoms.  ? Alcohol.  ? Drinking large amounts of liquid with meals.  ? Eating meals during the 2-3 hours before bed.  · Cook foods using methods other than frying. This may include baking, grilling, or broiling.  Lifestyle  · Maintain a healthy weight. Ask your health care provider what weight is healthy for you. If you need to lose weight, work with your health care provider to do so safely.  · Exercise for at least 30 minutes on 5 or more days each week, or as told by your health care provider.  · Avoid wearing clothes that fit tightly around your waist and chest.  · Do not use any products that contain nicotine or tobacco, such as cigarettes and e-cigarettes. If you need help quitting, ask your health care provider.  · Sleep with the head of your bed raised.  Use a wedge under the mattress or blocks under the bed frame to raise the head of the bed.  What foods are not recommended?  The items listed may not be a complete list. Talk with your dietitian about what dietary choices are best for you.  Grains  Pastries or quick breads with added fat. French toast.  Vegetables  Deep fried vegetables. French fries. Any vegetables prepared with added fat. Any vegetables that cause symptoms. For some people this may include tomatoes and tomato products, chili peppers, onions and garlic, and horseradish.  Fruits  Any fruits prepared with added fat. Any fruits that cause symptoms. For some people this may include citrus fruits, such as oranges, grapefruit, pineapple, and evangelina.  Meats and other protein foods  High-fat meats, such as fatty beef or pork, hot dogs, ribs, ham, sausage, salami and bravo. Fried meat or protein, including fried fish and fried chicken. Nuts and nut butters.  Dairy  Whole milk and chocolate milk. Sour cream. Cream. Ice cream. Cream cheese. Milk shakes.  Beverages  Coffee and tea, with or without caffeine. Carbonated beverages. Sodas. Energy drinks. Fruit juice made with acidic fruits (such as orange or grapefruit). Tomato juice. Alcoholic drinks.  Fats and oils  Butter. Margarine. Shortening. Ghee.  Sweets and desserts  Chocolate and cocoa. Donuts.  Seasoning and other foods  Pepper. Peppermint and spearmint. Any condiments, herbs, or seasonings that cause symptoms. For some people, this may include littlejohn, hot sauce, or vinegar-based salad dressings.  Summary  · When you have gastroesophageal reflux disease (GERD), food and lifestyle choices are very important to help ease the discomfort of GERD.  · Eat frequent, small meals instead of three large meals each day. Eat your meals slowly, in a relaxed setting. Avoid bending over or lying down until 2-3 hours after eating.  · Limit high-fat foods such as fatty meat or fried foods.  This information is not  intended to replace advice given to you by your health care provider. Make sure you discuss any questions you have with your health care provider.  Document Released: 12/18/2006 Document Revised: 12/19/2017 Document Reviewed: 12/19/2017  [employer] Interactive Patient Education © 2020 [employer]      Hypothyroidism    Hypothyroidism is when the thyroid gland does not make enough of certain hormones (it is underactive). The thyroid gland is a small gland located in the lower front part of the neck, just in front of the windpipe (trachea). This gland makes hormones that help control how the body uses food for energy (metabolism) as well as how the heart and brain function. These hormones also play a role in keeping your bones strong. When the thyroid is underactive, it produces too little of the hormones thyroxine (T4) and triiodothyronine (T3).  What are the causes?  This condition may be caused by:  · Hashimoto's disease. This is a disease in which the body's disease-fighting system (immune system) attacks the thyroid gland. This is the most common cause.  · Viral infections.  · Pregnancy.  · Certain medicines.  · Birth defects.  · Past radiation treatments to the head or neck for cancer.  · Past treatment with radioactive iodine.  · Past exposure to radiation in the environment.  · Past surgical removal of part or all of the thyroid.  · Problems with a gland in the center of the brain (pituitary gland).  · Lack of enough iodine in the diet.  What increases the risk?  You are more likely to develop this condition if:  · You are female.  · You have a family history of thyroid conditions.  · You use a medicine called lithium.  · You take medicines that affect the immune system (immunosuppressants).  What are the signs or symptoms?  Symptoms of this condition include:  · Feeling as though you have no energy (lethargy).  · Not being able to tolerate cold.  · Weight gain that is not explained by a change in diet or  exercise habits.  · Lack of appetite.  · Dry skin.  · Coarse hair.  · Menstrual irregularity.  · Slowing of thought processes.  · Constipation.  · Sadness or depression.  How is this diagnosed?  This condition may be diagnosed based on:  · Your symptoms, your medical history, and a physical exam.  · Blood tests.  You may also have imaging tests, such as an ultrasound or MRI.  How is this treated?  This condition is treated with medicine that replaces the thyroid hormones that your body does not make. After you begin treatment, it may take several weeks for symptoms to go away.  Follow these instructions at home:  · Take over-the-counter and prescription medicines only as told by your health care provider.  · If you start taking any new medicines, tell your health care provider.  · Keep all follow-up visits as told by your health care provider. This is important.  ? As your condition improves, your dosage of thyroid hormone medicine may change.  ? You will need to have blood tests regularly so that your health care provider can monitor your condition.  Contact a health care provider if:  · Your symptoms do not get better with treatment.  · You are taking thyroid replacement medicine and you:  ? Sweat a lot.  ? Have tremors.  ? Feel anxious.  ? Lose weight rapidly.  ? Cannot tolerate heat.  ? Have emotional swings.  ? Have diarrhea.  ? Feel weak.  Get help right away if you have:  · Chest pain.  · An irregular heartbeat.  · A rapid heartbeat.  · Difficulty breathing.  Summary  · Hypothyroidism is when the thyroid gland does not make enough of certain hormones (it is underactive).  · When the thyroid is underactive, it produces too little of the hormones thyroxine (T4) and triiodothyronine (T3).  · The most common cause is Hashimoto's disease, a disease in which the body's disease-fighting system (immune system) attacks the thyroid gland. The condition can also be caused by viral infections, medicine, pregnancy, or  past radiation treatment to the head or neck.  · Symptoms may include weight gain, dry skin, constipation, feeling as though you do not have energy, and not being able to tolerate cold.  · This condition is treated with medicine to replace the thyroid hormones that your body does not make.  This information is not intended to replace advice given to you by your health care provider. Make sure you discuss any questions you have with your health care provider.  Document Released: 12/18/2006 Document Revised: 11/28/2018 Document Reviewed: 11/28/2018  [employer] Interactive Patient Education © 2020 [employer]

## 2020-03-31 DIAGNOSIS — F51.01 PRIMARY INSOMNIA: ICD-10-CM

## 2020-03-31 RX ORDER — ZOLPIDEM TARTRATE 10 MG/1
10 TABLET ORAL NIGHTLY PRN
Qty: 30 TABLET | Refills: 2 | Status: SHIPPED | OUTPATIENT
Start: 2020-03-31 | End: 2020-06-03 | Stop reason: SDUPTHER

## 2020-03-31 NOTE — TELEPHONE ENCOUNTER
Pt requesting med refill on zolpidem (AMBIEN) 10 MG tablet [id number] - [facility] [address] AT [facility] [address] [facility] - [phone] [facility] [phone]

## 2020-04-01 DIAGNOSIS — E78.00 PURE HYPERCHOLESTEROLEMIA: ICD-10-CM

## 2020-04-01 RX ORDER — PRAVASTATIN SODIUM 20 MG
TABLET ORAL
Qty: 30 TABLET | Refills: 5 | OUTPATIENT
Start: 2020-04-01

## 2020-04-14 ENCOUNTER — APPOINTMENT (OUTPATIENT)
Dept: MAMMOGRAPHY | Facility: HOSPITAL | Age: 60
End: 2020-04-14

## 2020-06-03 ENCOUNTER — OFFICE VISIT (OUTPATIENT)
Dept: INTERNAL MEDICINE | Facility: CLINIC | Age: 60
End: 2020-06-03

## 2020-06-03 VITALS
HEIGHT: 66 IN | HEART RATE: 88 BPM | OXYGEN SATURATION: 98 % | BODY MASS INDEX: 38.47 KG/M2 | DIASTOLIC BLOOD PRESSURE: 86 MMHG | TEMPERATURE: 98.7 F | WEIGHT: 239.4 LBS | SYSTOLIC BLOOD PRESSURE: 120 MMHG

## 2020-06-03 DIAGNOSIS — G47.33 OBSTRUCTIVE SLEEP APNEA SYNDROME: ICD-10-CM

## 2020-06-03 DIAGNOSIS — R07.9 CHEST PAIN, UNSPECIFIED TYPE: ICD-10-CM

## 2020-06-03 DIAGNOSIS — F51.01 PRIMARY INSOMNIA: Primary | ICD-10-CM

## 2020-06-03 PROCEDURE — 93000 ELECTROCARDIOGRAM COMPLETE: CPT | Performed by: FAMILY MEDICINE

## 2020-06-03 PROCEDURE — 99214 OFFICE O/P EST MOD 30 MIN: CPT | Performed by: FAMILY MEDICINE

## 2020-06-03 RX ORDER — NITROGLYCERIN 0.4 MG/1
0.4 TABLET SUBLINGUAL
Qty: 25 TABLET | Refills: 12 | Status: SHIPPED | OUTPATIENT
Start: 2020-06-03 | End: 2020-11-10

## 2020-06-03 RX ORDER — ZOLPIDEM TARTRATE 10 MG/1
10 TABLET ORAL NIGHTLY PRN
Qty: 30 TABLET | Refills: 2 | Status: SHIPPED | OUTPATIENT
Start: 2020-06-03 | End: 2020-09-10 | Stop reason: SDUPTHER

## 2020-06-03 RX ORDER — ASPIRIN 81 MG/1
81 TABLET ORAL DAILY
Qty: 64 TABLET | Refills: 0 | COMMUNITY
Start: 2020-06-03 | End: 2021-01-20

## 2020-06-03 NOTE — PROGRESS NOTES
"Wendy Lincoln is a 59 y.o. female.     Chief Complaint   Patient presents with   • Insomnia     CSC 10/22/2019 UDS 1/21/2020       Visit Vitals  /86 (BP Location: Right arm, Cuff Size: Large Adult)   Pulse 88   Temp 98.7 °F (37.1 °C)   Ht 166.4 cm (65.5\")   Wt 109 kg (239 lb 6.4 oz)   SpO2 98%   BMI 39.23 kg/m²         Insomnia   This is a chronic problem. The current episode started more than 1 year ago. The problem occurs constantly. The problem has been unchanged. Associated symptoms include chest pain (last night), congestion and coughing (mild). Pertinent negatives include no abdominal pain, anorexia, arthralgias, change in bowel habit, chills, diaphoresis, fatigue, fever, headaches, joint swelling, myalgias, nausea, neck pain, numbness, rash, sore throat, swollen glands, urinary symptoms, vertigo, visual change, vomiting or weakness. Nothing aggravates the symptoms. Treatments tried: ambien. The treatment provided significant relief.      Pt had chest pain last night after children had a big fight. Pain lasted for about 5 minutes. Pt took ASA. Pt has negative stress test 5 yrs ago. Pt has not had chest pain other than that episode. No dyspnea, no indigestion, no diaphoresis. Pt states that her blood pressure went up transiently when she had the chest pain.     Pt is unable to run on a treadmill.   The following portions of the patient's history were reviewed and updated as appropriate: allergies, current medications, past family history, past medical history, past social history, past surgical history and problem list.    Past Medical History:   Diagnosis Date   • Anxiety    • Calculus of kidney    • Carbuncle and furuncle of buttock 08/16/2012   • Chalazion    • GERD (gastroesophageal reflux disease)    • Goiter    • H/O bone density study 03/28/2018    nl   • Hyperlipidemia    • Hypothyroid    • Insomnia    • Persistent disorder of initiating or maintaining sleep    • Routine general " medical examination at a health care facility 02/19/2013   • Routine general medical examination at a health care facility 02/16/2012   • Sacroiliac pain    • Sicca syndrome (CMS/HCC)    • Sleep apnea    • Visit for routine gyn exam 02/16/2012      Past Surgical History:   Procedure Laterality Date   • REPLACEMENT TOTAL KNEE BILATERAL     • TOTAL ABDOMINAL HYSTERECTOMY WITH SALPINGO OOPHORECTOMY Bilateral 2002      Family History   Problem Relation Age of Onset   • Thyroid disease Mother    • Memory loss Mother    • Alzheimer's disease Mother    • Other Mother         BCCA; Blood Clot   • Heart disease Father    • Atrial fibrillation Father    • Cirrhosis Father    • Depression Father    • Other Father         SCCA   • Alzheimer's disease Maternal Aunt    • Breast cancer Maternal Grandmother 80   • Atrial fibrillation Sister    • Ovarian cancer Neg Hx    • BRCA 1/2 Neg Hx       Social History     Socioeconomic History   • Marital status:      Spouse name: Not on file   • Number of children: Not on file   • Years of education: Not on file   • Highest education level: Not on file   Tobacco Use   • Smoking status: Former Smoker   • Smokeless tobacco: Never Used   • Tobacco comment: quit 30 + years   Substance and Sexual Activity   • Alcohol use: Yes     Alcohol/week: 5.0 standard drinks     Types: 5 Glasses of wine per week     Comment: red wine   • Drug use: No   • Sexual activity: Yes     Partners: Male     Birth control/protection: Post-menopausal, Surgical      Allergies   Allergen Reactions   • Morphine And Related Swelling     Tongue swells, doesn't help pain   • Erythromycin Nausea Only   • Requip [Ropinirole Hcl] Nausea Only       Review of Systems   Constitutional: Negative.  Negative for chills, diaphoresis, fatigue and fever.   HENT: Positive for congestion, postnasal drip, rhinorrhea and sinus pressure. Negative for ear pain, nosebleeds, sneezing and sore throat.    Eyes: Positive for itching.  Negative for redness.   Respiratory: Positive for apnea and cough (mild). Negative for shortness of breath and wheezing.    Cardiovascular: Positive for chest pain (last night). Negative for palpitations and leg swelling.   Gastrointestinal: Negative.  Negative for abdominal pain, anorexia, change in bowel habit, constipation, diarrhea, nausea and vomiting.   Endocrine: Negative.  Negative for cold intolerance and heat intolerance.   Genitourinary: Negative.  Negative for dysuria, frequency, hematuria and urgency.   Musculoskeletal: Negative.  Negative for arthralgias, back pain, joint swelling, myalgias and neck pain.   Skin: Negative.  Negative for color change and rash.   Allergic/Immunologic: Negative.  Negative for environmental allergies.   Neurological: Negative.  Negative for dizziness, vertigo, syncope, weakness, light-headedness, numbness and headaches.   Hematological: Negative.  Negative for adenopathy. Does not bruise/bleed easily.   Psychiatric/Behavioral: Positive for sleep disturbance. Negative for dysphoric mood. The patient has insomnia. The patient is not nervous/anxious.        Objective   Physical Exam   Constitutional: She is oriented to person, place, and time. She appears well-developed.   HENT:   Head: Normocephalic.   Right Ear: External ear normal.   Left Ear: External ear normal.   Nose: Nose normal.   Eyes: Pupils are equal, round, and reactive to light. Conjunctivae, EOM and lids are normal.   Neck: Trachea normal and normal range of motion. Neck supple. Carotid bruit is not present. No thyroid mass and no thyromegaly present.   Cardiovascular: Normal rate and regular rhythm.   No murmur heard.  Pulmonary/Chest: Effort normal and breath sounds normal. No respiratory distress. She has no decreased breath sounds. She has no wheezes. She has no rhonchi. She has no rales. She exhibits no tenderness.   Abdominal: Soft. Bowel sounds are normal. There is no tenderness.   Musculoskeletal:  Normal range of motion.   Neurological: She is alert and oriented to person, place, and time.   Skin: Skin is warm and dry.   Psychiatric: She has a normal mood and affect. Her behavior is normal.   Nursing note and vitals reviewed.        Assessment/Plan   Ana was seen today for insomnia.    Diagnoses and all orders for this visit:    Primary insomnia  -     zolpidem (AMBIEN) 10 MG tablet; Take 1 tablet by mouth At Night As Needed for Sleep. for sleep    Obstructive sleep apnea syndrome  -     Ambulatory Referral to Sleep Medicine    Chest pain, unspecified type  -     ECG 12 Lead  -     aspirin 81 MG EC tablet; Take 1 tablet by mouth Daily.  -     Ambulatory Referral to [facility]  -     nitroglycerin (NITROSTAT) 0.4 MG SL tablet; Place 1 tablet under the tongue Every 5 (Five) Minutes As Needed for Chest Pain. Take no more than 3 doses in 15 minutes.      To ER if chest pain recurs.   Start ASA 81 mg daily.  NTG in the event that chest pain recurs.            Current Outpatient Medications:   •  cetirizine (ZyrTEC) 10 MG tablet, Take 10 mg by mouth daily., Disp: , Rfl:   •  fluticasone (FLONASE) 50 MCG/ACT nasal spray, 2 sprays into each nostril Daily. Administer 2 sprays in each nostril for each dose., Disp: 16 g, Rfl: 5  •  levothyroxine (SYNTHROID, LEVOTHROID) 150 MCG tablet, Take 1 tablet by mouth Daily., Disp: 30 tablet, Rfl: 5  •  naproxen (NAPROSYN) 500 MG tablet, TAKE 1 TABLET BY MOUTH EVERY EVENING PRN MILD PAIN, Disp: 30 tablet, Rfl: 5  •  omeprazole (priLOSEC) 20 MG capsule, Take 1 capsule by mouth Daily., Disp: 30 capsule, Rfl: 5  •  pravastatin (PRAVACHOL) 20 MG tablet, Take 1 tablet by mouth every night at bedtime., Disp: 30 tablet, Rfl: 5  •  zolpidem (AMBIEN) 10 MG tablet, Take 1 tablet by mouth At Night As Needed for Sleep. for sleep, Disp: 30 tablet, Rfl: 2  •  aspirin 81 MG EC tablet, Take 1 tablet by mouth Daily., Disp: 64 tablet, Rfl: 0  •  nitroglycerin  (NITROSTAT) 0.4 MG SL tablet, Place 1 tablet under the tongue Every 5 (Five) Minutes As Needed for Chest Pain. Take no more than 3 doses in 15 minutes., Disp: 25 tablet, Rfl: 12    Return in about 3 months (around 9/3/2020), or if symptoms worsen or fail to improve, for Recheck.     [facility] report reviewed and is consistent #[id number]      ECG 12 Lead  Date/Time: 6/3/2020 9:05 AM  Performed by: Bryanna Beckwith MD  Authorized by: Bryanna Beckwith MD   Comparison: compared with previous ECG from 1/21/2020  Similar to previous ECG  Rhythm: sinus rhythm  Rate: normal  BPM: 79  Conduction: conduction normal  ST Segments: ST segments normal  T Waves: T waves normal  QRS axis: normal (P, R, T: 21, 1 36)  Other: no other findings    Clinical impression: normal ECG  Comments: CT int 151 ms  QRS dur 91 ms  QT/QTc 379/413 ms            Answers for HPI/ROS submitted by the patient on 6/1/2020   What is the primary reason for your visit?: Other  Please describe your symptoms.: I am not sick, you all make me come in every three months  Have you had these symptoms before?: No  How long have you been having these symptoms?: Greater than 2 weeks

## 2020-06-05 ENCOUNTER — APPOINTMENT (OUTPATIENT)
Dept: MAMMOGRAPHY | Facility: HOSPITAL | Age: 60
End: 2020-06-05

## 2020-06-15 ENCOUNTER — OFFICE VISIT (OUTPATIENT)
Dept: CARDIOLOGY | Facility: HOSPITAL | Age: 60
End: 2020-06-15

## 2020-06-15 VITALS
WEIGHT: 238 LBS | HEART RATE: 85 BPM | TEMPERATURE: 98.3 F | HEIGHT: 66 IN | OXYGEN SATURATION: 95 % | RESPIRATION RATE: 18 BRPM | DIASTOLIC BLOOD PRESSURE: 68 MMHG | BODY MASS INDEX: 38.25 KG/M2 | SYSTOLIC BLOOD PRESSURE: 120 MMHG

## 2020-06-15 DIAGNOSIS — R07.2 PRECORDIAL PAIN: Primary | ICD-10-CM

## 2020-06-15 DIAGNOSIS — R53.83 OTHER FATIGUE: ICD-10-CM

## 2020-06-15 DIAGNOSIS — E78.00 PURE HYPERCHOLESTEROLEMIA: ICD-10-CM

## 2020-06-15 PROCEDURE — 99214 OFFICE O/P EST MOD 30 MIN: CPT | Performed by: NURSE PRACTITIONER

## 2020-06-15 NOTE — PROGRESS NOTES
[facility]      06/15/2020         Ana NADYA Latonia  [address] [place] [address]  [unfilled]    1960    Bryanna Beckwith MD Kathrkiara COVINGTON Latonia is a 59 y.o. female.      Subjective:     Chief Complaint:  Chest Pain and Establish Care         HPI 59 year old female presents to the office today for ongoing evaluation of her chest pain. She reports that she experienced chest pain after her adult children had a fight. She reports that the pain was located in her left chest and did not radiate. Pain started at rest and did not worsen with exertion.  She notes occasional dyspnea on exertion.  Denies dizziness, palpitations, presyncope, syncope, orthopnea, PND or pedal edema.  History of stress test roughly 5 years ago.  Patient reports father had MI in mid 60s.  She is a former smoker.    Patient Active Problem List   Diagnosis   • Sicca (CMS/HCC)   • Acquired hypothyroidism   • Gastroesophageal reflux disease without esophagitis   • History of kidney stones   • Sleep apnea   • Anxiety   • Primary insomnia   • Hyperlipidemia   • Sicca syndrome (CMS/HCC)   • Calculus of kidney   • Persistent disorder of initiating or maintaining sleep   • Chalazion   • Goiter       Past Medical History:   Diagnosis Date   • Anxiety    • Calculus of kidney    • Carbuncle and furuncle of buttock 08/16/2012   • Chalazion    • GERD (gastroesophageal reflux disease)    • Goiter    • H/O bone density study 03/28/2018    nl   • Hyperlipidemia    • Hypothyroid    • Insomnia    • Persistent disorder of initiating or maintaining sleep    • Routine general medical examination at a health care facility 02/19/2013   • Routine general medical examination at a health care facility 02/16/2012   • Sacroiliac pain    • Sicca syndrome (CMS/HCC)    • Sleep apnea    • Visit for routine gyn exam 02/16/2012       Past Surgical History:   Procedure Laterality Date   • REPLACEMENT TOTAL KNEE BILATERAL      • TOTAL ABDOMINAL HYSTERECTOMY WITH SALPINGO OOPHORECTOMY Bilateral 2002       Family History   Problem Relation Age of Onset   • Thyroid disease Mother    • Memory loss Mother    • Alzheimer's disease Mother    • Other Mother         BCCA; Blood Clot   • Heart disease Father    • Atrial fibrillation Father    • Cirrhosis Father    • Depression Father    • Other Father         SCCA   • Alzheimer's disease Maternal Aunt    • Breast cancer Maternal Grandmother 80   • Atrial fibrillation Sister    • Ovarian cancer Neg Hx    • BRCA 1/2 Neg Hx        Social History     Socioeconomic History   • Marital status:      Spouse name: Not on file   • Number of children: Not on file   • Years of education: Not on file   • Highest education level: Not on file   Tobacco Use   • Smoking status: Former Smoker   • Smokeless tobacco: Never Used   • Tobacco comment: quit 30 + years   Substance and Sexual Activity   • Alcohol use: Yes     Alcohol/week: 5.0 standard drinks     Types: 5 Glasses of wine per week     Frequency: 4 or more times a week     Drinks per session: 1 or 2     Binge frequency: Never     Comment: red wine   • Drug use: No   • Sexual activity: Yes     Partners: Male     Birth control/protection: Post-menopausal, Surgical   Social History Narrative    Caffeine: 2 20 oz tea daily       Allergies   Allergen Reactions   • Morphine And Related Swelling     Tongue swells, doesn't help pain   • Erythromycin Nausea Only   • Requip [Ropinirole Hcl] Nausea Only         Current Outpatient Medications:   •  aspirin 81 MG EC tablet, Take 1 tablet by mouth Daily., Disp: 64 tablet, Rfl: 0  •  cetirizine (ZyrTEC) 10 MG tablet, Take 10 mg by mouth daily., Disp: , Rfl:   •  fluticasone (FLONASE) 50 MCG/ACT nasal spray, 2 sprays into each nostril Daily. Administer 2 sprays in each nostril for each dose., Disp: 16 g, Rfl: 5  •  levothyroxine (SYNTHROID, LEVOTHROID) 150 MCG tablet, Take 1 tablet by mouth Daily., Disp: 30 tablet,  Rfl: 5  •  naproxen (NAPROSYN) 500 MG tablet, TAKE 1 TABLET BY MOUTH EVERY EVENING PRN MILD PAIN, Disp: 30 tablet, Rfl: 5  •  nitroglycerin (NITROSTAT) 0.4 MG SL tablet, Place 1 tablet under the tongue Every 5 (Five) Minutes As Needed for Chest Pain. Take no more than 3 doses in 15 minutes., Disp: 25 tablet, Rfl: 12  •  omeprazole (priLOSEC) 20 MG capsule, Take 1 capsule by mouth Daily., Disp: 30 capsule, Rfl: 5  •  pravastatin (PRAVACHOL) 20 MG tablet, Take 1 tablet by mouth every night at bedtime., Disp: 30 tablet, Rfl: 5  •  zolpidem (AMBIEN) 10 MG tablet, Take 1 tablet by mouth At Night As Needed for Sleep. for sleep, Disp: 30 tablet, Rfl: 2    The following portions of the patient's history were reviewed today and updated as appropriate: allergies, current medications, past family history, past medical history, past social history, past surgical history and problem list     Review of Systems   Constitution: Negative for chills, decreased appetite, diaphoresis, fever, malaise/fatigue, night sweats, weight gain and weight loss.   HENT: Negative for congestion, hearing loss, hoarse voice and nosebleeds.    Eyes: Negative for blurred vision, visual disturbance and visual halos.   Cardiovascular: Positive for chest pain. Negative for claudication, cyanosis, dyspnea on exertion, irregular heartbeat, leg swelling, near-syncope, orthopnea, palpitations, paroxysmal nocturnal dyspnea and syncope.   Respiratory: Negative for cough, hemoptysis, shortness of breath, sleep disturbances due to breathing, snoring, sputum production and wheezing.    Hematologic/Lymphatic: Negative for bleeding problem. Does not bruise/bleed easily.   Skin: Negative for dry skin, itching and rash.   Musculoskeletal: Positive for joint pain. Negative for arthritis, falls, joint swelling and myalgias.   Gastrointestinal: Negative for bloating, abdominal pain, constipation, diarrhea, flatus, heartburn, hematemesis, hematochezia, melena, nausea and  "vomiting.   Genitourinary: Negative for dysuria, frequency, hematuria, nocturia and urgency.   Neurological: Negative for excessive daytime sleepiness, dizziness, headaches, light-headedness, loss of balance and weakness.   Psychiatric/Behavioral: Negative for depression. The patient does not have insomnia and is not nervous/anxious.        Objective:     Vitals:    06/15/20 0807 06/15/20 0808 06/15/20 0809   BP: 139/71 141/70 120/68   BP Location: Left arm Left arm Right arm   Patient Position: Standing Sitting Sitting   Cuff Size: Adult Adult Adult   Pulse: 86 82 85   Resp:   18   Temp:   98.3 °F (36.8 °C)   TempSrc:   Temporal   SpO2: 96% 98% 95%   Weight:   108 kg (238 lb)   Height:   166.4 cm (65.5\")       Body mass index is 39 kg/m².    Physical Exam   Constitutional: She is oriented to person, place, and time. She appears well-developed and well-nourished. She is active and cooperative. No distress.   HENT:   Head: Normocephalic and atraumatic.   Mouth/Throat: Oropharynx is clear and moist.   Eyes: Pupils are equal, round, and reactive to light. Conjunctivae and EOM are normal.   Neck: Normal range of motion. Neck supple. No JVD present. No tracheal deviation present. No thyromegaly present.   Cardiovascular: Normal rate, regular rhythm, normal heart sounds and intact distal pulses.   Pulmonary/Chest: Effort normal and breath sounds normal.   Abdominal: Soft. Bowel sounds are normal. She exhibits no distension. There is no tenderness.   Musculoskeletal: Normal range of motion.   Neurological: She is alert and oriented to person, place, and time.   Skin: Skin is warm, dry and intact.   Psychiatric: She has a normal mood and affect. Her behavior is normal.   Nursing note and vitals reviewed.      Lab and Diagnostic Review:  Results for orders placed or performed in visit on 01/21/20   Urine Drug Screen - Urine, Clean Catch   Result Value Ref Range    THC, Screen, Urine Negative Negative    Phencyclidine (PCP), " Urine Negative Negative    Cocaine Screen, Urine Negative Negative    Methamphetamine, Ur Negative Negative    Opiate Screen Negative Negative    Amphetamine Screen, Urine Negative Negative    Benzodiazepine Screen, Urine Negative Negative    Tricyclic Antidepressants Screen Negative Negative    Methadone Screen, Urine Negative Negative    Barbiturates Screen, Urine Negative Negative    Oxycodone Screen, Urine Negative Negative    Propoxyphene Screen Negative Negative    Buprenorphine, Screen, Urine Negative Negative   TSH   Result Value Ref Range    TSH 1.740 0.270 - 4.200 uIU/mL   T4, Free   Result Value Ref Range    Free T4 1.47 0.93 - 1.70 ng/dL   Comprehensive Metabolic Panel   Result Value Ref Range    Glucose 101 (H) 65 - 99 mg/dL    BUN 11 6 - 20 mg/dL    Creatinine 0.61 0.57 - 1.00 mg/dL    Sodium 137 136 - 145 mmol/L    Potassium 4.0 3.5 - 5.2 mmol/L    Chloride 100 98 - 107 mmol/L    CO2 25.2 22.0 - 29.0 mmol/L    Calcium 9.0 8.6 - 10.5 mg/dL    Total Protein 6.9 6.0 - 8.5 g/dL    Albumin 3.90 3.50 - 5.20 g/dL    ALT (SGPT) 12 1 - 33 U/L    AST (SGOT) 14 1 - 32 U/L    Alkaline Phosphatase 77 39 - 117 U/L    Total Bilirubin 0.4 0.2 - 1.2 mg/dL    eGFR Non African Amer 101 >60 mL/min/1.73    Globulin 3.0 gm/dL    A/G Ratio 1.3 g/dL    BUN/Creatinine Ratio 18.0 7.0 - 25.0    Anion Gap 11.8 5.0 - 15.0 mmol/L   Lipid Panel   Result Value Ref Range    Total Cholesterol 175 0 - 200 mg/dL    Triglycerides 111 0 - 150 mg/dL    HDL Cholesterol 58 40 - 60 mg/dL    LDL Cholesterol  95 0 - 100 mg/dL    VLDL Cholesterol 22.2 5 - 40 mg/dL    LDL/HDL Ratio 1.63      Ekg: ECG 12 Lead  Date/Time: 6/3/2020 9:05 AM  Performed by: Bryanna Beckwith MD  Authorized by: Bryanna Beckwith MD   Comparison: compared with previous ECG from 1/21/2020  Similar to previous ECG  Rhythm: sinus rhythm  Rate: normal  BPM: 79  Conduction: conduction normal  ST Segments: ST segments normal  T Waves: T waves normal  QRS axis: normal (P, R,  T: 21, 1 36)  Other: no other findings    Clinical impression: normal ECG  Comments: NJ int 151 ms  QRS dur 91 ms  QT/QTc 379/413 ms    Assessment and Plan:   1. Precordial pain  magui score 1   - Stress Test With Myocardial Perfusion (1 Day); Future    2. Pure hypercholesterolemia  Statin therapy   - Stress Test With Myocardial Perfusion (1 Day); Future    3. Other fatigue    - Stress Test With Myocardial Perfusion (1 Day); Future        It has been a pleasure to participate in the care of this patient.  Patient was instructed to call the [facility] with any questions, concerns, or worsening symptoms.    *Please note that portions of this note were completed with a voice recognition program. Efforts were made to edit the dictations, but occasionally words are mistranscribed.      Answers for HPI/ROS submitted by the patient on 6/12/2020   What is the primary reason for your visit?: Other  Please describe your symptoms.: My doctor wanted me to see you because I had a pain under my left breast and my blood pressure was a little high that night.  Have you had these symptoms before?: No  How long have you been having these symptoms?: 1-4 days  Please list any medications you are currently taking for this condition.: She put me on 81 mg of aspirin and I only had the pain that one night.  Please describe any probable cause for these symptoms. : I was under a lot of stress that night and there was a big fight in my house and I think that’s how it happened due to stress.

## 2020-06-16 ENCOUNTER — HOSPITAL ENCOUNTER (OUTPATIENT)
Dept: MAMMOGRAPHY | Facility: HOSPITAL | Age: 60
Discharge: HOME | End: 2020-06-16
Admitting: FAMILY MEDICINE

## 2020-06-16 DIAGNOSIS — Z12.31 VISIT FOR SCREENING MAMMOGRAM: ICD-10-CM

## 2020-06-16 PROCEDURE — 77063 BREAST TOMOSYNTHESIS BI: CPT | Performed by: RADIOLOGY

## 2020-06-16 PROCEDURE — 77067 SCR MAMMO BI INCL CAD: CPT | Performed by: RADIOLOGY

## 2020-06-16 PROCEDURE — 77063 BREAST TOMOSYNTHESIS BI: CPT

## 2020-06-16 PROCEDURE — 77067 SCR MAMMO BI INCL CAD: CPT

## 2020-06-17 ENCOUNTER — TELEMEDICINE (OUTPATIENT)
Dept: SLEEP MEDICINE | Facility: HOSPITAL | Age: 60
End: 2020-06-17

## 2020-06-17 DIAGNOSIS — E66.3 OVERWEIGHT: ICD-10-CM

## 2020-06-17 DIAGNOSIS — G47.33 OBSTRUCTIVE SLEEP APNEA, ADULT: ICD-10-CM

## 2020-06-17 DIAGNOSIS — R06.83 SNORING: Primary | ICD-10-CM

## 2020-06-17 DIAGNOSIS — F51.04 PSYCHOPHYSIOLOGICAL INSOMNIA: ICD-10-CM

## 2020-06-17 PROCEDURE — 99203 OFFICE O/P NEW LOW 30 MIN: CPT | Performed by: INTERNAL MEDICINE

## 2020-06-17 NOTE — PATIENT INSTRUCTIONS
Insomnia  Insomnia is a sleep disorder that makes it difficult to fall asleep or stay asleep. Insomnia can cause fatigue, low energy, difficulty concentrating, mood swings, and poor performance at work or school.  There are three different ways to classify insomnia:  · Difficulty falling asleep.  · Difficulty staying asleep.  · Waking up too early in the morning.  Any type of insomnia can be long-term (chronic) or short-term (acute). Both are common. Short-term insomnia usually lasts for three months or less. Chronic insomnia occurs at least three times a week for longer than three months.  What are the causes?  Insomnia may be caused by another condition, situation, or substance, such as:  · Anxiety.  · Certain medicines.  · Gastroesophageal reflux disease (GERD) or other gastrointestinal conditions.  · Asthma or other breathing conditions.  · Restless legs syndrome, sleep apnea, or other sleep disorders.  · Chronic pain.  · Menopause.  · Stroke.  · Abuse of alcohol, tobacco, or illegal drugs.  · Mental health conditions, such as depression.  · Caffeine.  · Neurological disorders, such as Alzheimer's disease.  · An overactive thyroid (hyperthyroidism).  Sometimes, the cause of insomnia may not be known.  What increases the risk?  Risk factors for insomnia include:  · Gender. Women are affected more often than men.  · Age. Insomnia is more common as you get older.  · Stress.  · Lack of exercise.  · Irregular work schedule or working night shifts.  · Traveling between different time zones.  · Certain medical and mental health conditions.  What are the signs or symptoms?  If you have insomnia, the main symptom is having trouble falling asleep or having trouble staying asleep. This may lead to other symptoms, such as:  · Feeling fatigued or having low energy.  · Feeling nervous about going to sleep.  · Not feeling rested in the morning.  · Having trouble concentrating.  · Feeling irritable, anxious, or depressed.  How  is this diagnosed?  This condition may be diagnosed based on:  · Your symptoms and medical history. Your health care provider may ask about:  ? Your sleep habits.  ? Any medical conditions you have.  ? Your mental health.  · A physical exam.  How is this treated?  Treatment for insomnia depends on the cause. Treatment may focus on treating an underlying condition that is causing insomnia. Treatment may also include:  · Medicines to help you sleep.  · Counseling or therapy.  · Lifestyle adjustments to help you sleep better.  Follow these instructions at home:  Eating and drinking    · Limit or avoid alcohol, caffeinated beverages, and cigarettes, especially close to bedtime. These can disrupt your sleep.  · Do not eat a large meal or eat spicy foods right before bedtime. This can lead to digestive discomfort that can make it hard for you to sleep.  Sleep habits    · Keep a sleep diary to help you and your health care provider figure out what could be causing your insomnia. Write down:  ? When you sleep.  ? When you wake up during the night.  ? How well you sleep.  ? How rested you feel the next day.  ? Any side effects of medicines you are taking.  ? What you eat and drink.  · Make your bedroom a dark, comfortable place where it is easy to fall asleep.  ? Put up shades or blackout curtains to block light from outside.  ? Use a white noise machine to block noise.  ? Keep the temperature cool.  · Limit screen use before bedtime. This includes:  ? Watching TV.  ? Using your smartphone, tablet, or computer.  · Stick to a routine that includes going to bed and waking up at the same times every day and night. This can help you fall asleep faster. Consider making a quiet activity, such as reading, part of your nighttime routine.  · Try to avoid taking naps during the day so that you sleep better at night.  · Get out of bed if you are still awake after 15 minutes of trying to sleep. Keep the lights down, but try reading or  doing a quiet activity. When you feel sleepy, go back to bed.  General instructions  · Take over-the-counter and prescription medicines only as told by your health care provider.  · Exercise regularly, as told by your health care provider. Avoid exercise starting several hours before bedtime.  · Use relaxation techniques to manage stress. Ask your health care provider to suggest some techniques that may work well for you. These may include:  ? Breathing exercises.  ? Routines to release muscle tension.  ? Visualizing peaceful scenes.  · Make sure that you drive carefully. Avoid driving if you feel very sleepy.  · Keep all follow-up visits as told by your health care provider. This is important.  Contact a health care provider if:  · You are tired throughout the day.  · You have trouble in your daily routine due to sleepiness.  · You continue to have sleep problems, or your sleep problems get worse.  Get help right away if:  · You have serious thoughts about hurting yourself or someone else.  If you ever feel like you may hurt yourself or others, or have thoughts about taking your own life, get help right away. You can go to your nearest emergency department or call:  · Your local emergency services (911 in the [place]  · A suicide crisis helpline, such as the National Suicide Prevention Lifeline at 1-768.669.5855. This is open 24 hours a day.  Summary  · Insomnia is a sleep disorder that makes it difficult to fall asleep or stay asleep.  · Insomnia can be long-term (chronic) or short-term (acute).  · Treatment for insomnia depends on the cause. Treatment may focus on treating an underlying condition that is causing insomnia.  · Keep a sleep diary to help you and your health care provider figure out what could be causing your insomnia.  This information is not intended to replace advice given to you by your health care provider. Make sure you discuss any questions you have with your health care provider.  Document  Released: 12/15/2001 Document Revised: 11/30/2018 Document Reviewed: 09/27/2018  [employer] Patient Education © 2020 [employer]  Sleep Apnea  Sleep apnea is a condition in which breathing pauses or becomes shallow during sleep. Episodes of sleep apnea usually last 10 seconds or longer, and they may occur as many as 20 times an hour. Sleep apnea disrupts your sleep and keeps your body from getting the rest that it needs. This condition can increase your risk of certain health problems, including:  · Heart attack.  · Stroke.  · Obesity.  · Diabetes.  · Heart failure.  · Irregular heartbeat.  What are the causes?  There are three kinds of sleep apnea:  · Obstructive sleep apnea. This kind is caused by a blocked or collapsed airway.  · Central sleep apnea. This kind happens when the part of the brain that controls breathing does not send the correct signals to the muscles that control breathing.  · Mixed sleep apnea. This is a combination of obstructive and central sleep apnea.  The most common cause of this condition is a collapsed or blocked airway. An airway can collapse or become blocked if:  · Your throat muscles are abnormally relaxed.  · Your tongue and tonsils are larger than normal.  · You are overweight.  · Your airway is smaller than normal.  What increases the risk?  You are more likely to develop this condition if you:  · Are overweight.  · Smoke.  · Have a smaller than normal airway.  · Are elderly.  · Are male.  · Drink alcohol.  · Take sedatives or tranquilizers.  · Have a family history of sleep apnea.  What are the signs or symptoms?  Symptoms of this condition include:  · Trouble staying asleep.  · Daytime sleepiness and tiredness.  · Irritability.  · Loud snoring.  · Morning headaches.  · Trouble concentrating.  · Forgetfulness.  · Decreased interest in sex.  · Unexplained sleepiness.  · Mood swings.  · Personality changes.  · Feelings of depression.  · Waking up often during the night to  urinate.  · Dry mouth.  · Sore throat.  How is this diagnosed?  This condition may be diagnosed with:  · A medical history.  · A physical exam.  · A series of tests that are done while you are sleeping (sleep study). These tests are usually done in a sleep lab, but they may also be done at home.  How is this treated?  Treatment for this condition aims to restore normal breathing and to ease symptoms during sleep. It may involve managing health issues that can affect breathing, such as high blood pressure or obesity. Treatment may include:  · Sleeping on your side.  · Using a decongestant if you have nasal congestion.  · Avoiding the use of depressants, including alcohol, sedatives, and narcotics.  · Losing weight if you are overweight.  · Making changes to your diet.  · Quitting smoking.  · Using a device to open your airway while you sleep, such as:  ? An oral appliance. This is a custom-made mouthpiece that shifts your lower jaw forward.  ? A continuous positive airway pressure (CPAP) device. This device blows air through a mask when you breathe out (exhale).  ? A nasal expiratory positive airway pressure (EPAP) device. This device has valves that you put into each nostril.  ? A bi-level positive airway pressure (BPAP) device. This device blows air through a mask when you breathe in (inhale) and breathe out (exhale).  · Having surgery if other treatments do not work. During surgery, excess tissue is removed to create a wider airway.  It is important to get treatment for sleep apnea. Without treatment, this condition can lead to:  · High blood pressure.  · Coronary artery disease.  · In men, an inability to achieve or maintain an erection (impotence).  · Reduced thinking abilities.  Follow these instructions at home:  Lifestyle  · Make any lifestyle changes that your health care provider recommends.  · Eat a healthy, well-balanced diet.  · Take steps to lose weight if you are overweight.  · Avoid using depressants,  including alcohol, sedatives, and narcotics.  · Do not use any products that contain nicotine or tobacco, such as cigarettes, e-cigarettes, and chewing tobacco. If you need help quitting, ask your health care provider.  General instructions  · Take over-the-counter and prescription medicines only as told by your health care provider.  · If you were given a device to open your airway while you sleep, use it only as told by your health care provider.  · If you are having surgery, make sure to tell your health care provider you have sleep apnea. You may need to bring your device with you.  · Keep all follow-up visits as told by your health care provider. This is important.  Contact a health care provider if:  · The device that you received to open your airway during sleep is uncomfortable or does not seem to be working.  · Your symptoms do not improve.  · Your symptoms get worse.  Get help right away if:  · You develop:  ? Chest pain.  ? Shortness of breath.  ? Discomfort in your back, arms, or stomach.  · You have:  ? Trouble speaking.  ? Weakness on one side of your body.  ? Drooping in your face.  These symptoms may represent a serious problem that is an emergency. Do not wait to see if the symptoms will go away. Get medical help right away. Call your local emergency services (911 in the [place] Do not drive yourself to the hospital.  Summary  · Sleep apnea is a condition in which breathing pauses or becomes shallow during sleep.  · The most common cause is a collapsed or blocked airway.  · The goal of treatment is to restore normal breathing and to ease symptoms during sleep.  This information is not intended to replace advice given to you by your health care provider. Make sure you discuss any questions you have with your health care provider.  Document Released: 12/08/2003 Document Revised: 10/04/2019 Document Reviewed: 08/13/2019  [employer] Patient Education © 2020 [employer]

## 2020-06-18 NOTE — PROGRESS NOTES
Subjective   Ana Lincoln is a 59 y.o. female is being seen for consultation today at the request of Bryanna PEREA MD further evaluation difficulties getting to sleep and staying asleep. You have chosen to receive care through a telehealth visit.  Do you consent to use a video/audio connection for your medical care today? Yes    History of Present Illness  Patient thinks her big problem is that she cannot get to sleep.  She says she has been on Ambien for about 17 years and does not feel like she can sleep well without it.  She says she will awaken later in the evening if she does not take her Ambien.  She had a sleep study 15 years ago at [facility] but was told at that time that she needed CPAP but could not afford it.  She admits she has gained weight since then.  She has a history of hypothyroidism.  She says she had snoring noted since childhood even when she was relatively thin.  She has a history of allergies and complains of significant problems breathing through her nose.  She denies ever breaking her nose.  She denies being told she had a deviated septum.  She says she still snores loudly.  She denies being noted to have apneas.  She denies awakening gasping for breath.  She says she only sleeps on her side.  She describes that chest is being more comfortable.  She is noted that since she had a knee replacement.    She will awaken with a dry mouth.  She has a history of reflux and is on medications.  She awakens frequently at night.    She goes to bed between 11 p.m. and 11:30 PM..  If she takes her medicine she falls asleep within 10 to 15 minutes.  She thinks she wakens 3-4 times during the night.  She thinks she gets 7 to 8 hours of sleep.  She says she has to move frequently at night due to knee and back pain.  She denies any history of hypertension diabetes or coronary artery disease.  She has a history of arthritis and hypothyroidism  Allergies   Allergen Reactions   • Morphine And  "Related Swelling     Tongue swells, doesn't help pain   • Erythromycin Nausea Only   • Requip [Ropinirole Hcl] Nausea Only   She also has a history of \"hayfever\".      Current Outpatient Medications:   •  aspirin 81 MG EC tablet, Take 1 tablet by mouth Daily., Disp: 64 tablet, Rfl: 0  •  cetirizine (ZyrTEC) 10 MG tablet, Take 10 mg by mouth daily., Disp: , Rfl:   •  fluticasone (FLONASE) 50 MCG/ACT nasal spray, 2 sprays into each nostril Daily. Administer 2 sprays in each nostril for each dose., Disp: 16 g, Rfl: 5  •  levothyroxine (SYNTHROID, LEVOTHROID) 150 MCG tablet, Take 1 tablet by mouth Daily., Disp: 30 tablet, Rfl: 5  •  naproxen (NAPROSYN) 500 MG tablet, TAKE 1 TABLET BY MOUTH EVERY EVENING PRN MILD PAIN, Disp: 30 tablet, Rfl: 5  •  nitroglycerin (NITROSTAT) 0.4 MG SL tablet, Place 1 tablet under the tongue Every 5 (Five) Minutes As Needed for Chest Pain. Take no more than 3 doses in 15 minutes., Disp: 25 tablet, Rfl: 12  •  omeprazole (priLOSEC) 20 MG capsule, Take 1 capsule by mouth Daily., Disp: 30 capsule, Rfl: 5  •  pravastatin (PRAVACHOL) 20 MG tablet, Take 1 tablet by mouth every night at bedtime., Disp: 30 tablet, Rfl: 5  •  zolpidem (AMBIEN) 10 MG tablet, Take 1 tablet by mouth At Night As Needed for Sleep. for sleep, Disp: 30 tablet, Rfl: 2    Social History    Tobacco Use      Smoking status: Former Smoker      Smokeless tobacco: Never Used      Tobacco comment: quit 30 + years       Social History     Substance and Sexual Activity   Alcohol Use Yes   • Alcohol/week: 5.0 standard drinks   • Types: 5 Glasses of wine per week   • Frequency: 4 or more times a week   • Drinks per session: 1 or 2   • Binge frequency: Never    Comment: red wine       Caffeine: She has 2 servings of tea per day and may have 3 niesha per week.    Past Medical History:   Diagnosis Date   • Anxiety    • Calculus of kidney    • Carbuncle and furuncle of buttock 08/16/2012   • Chalazion    • GERD (gastroesophageal reflux " disease)    • Goiter    • H/O bone density study 03/28/2018    nl   • Hyperlipidemia    • Hypothyroid    • Insomnia    • Persistent disorder of initiating or maintaining sleep    • Routine general medical examination at a health care facility 02/19/2013   • Routine general medical examination at a health care facility 02/16/2012   • Sacroiliac pain    • Sicca syndrome (CMS/HCC)    • Sleep apnea    • Visit for routine gyn exam 02/16/2012       Past Surgical History:   Procedure Laterality Date   • REPLACEMENT TOTAL KNEE BILATERAL     • TOTAL ABDOMINAL HYSTERECTOMY WITH SALPINGO OOPHORECTOMY Bilateral 2002   She had a hernia repair and wisdom teeth extraction    Family History   Problem Relation Age of Onset   • Thyroid disease Mother    • Memory loss Mother    • Alzheimer's disease Mother    • Other Mother         BCCA; Blood Clot   • Heart disease Father    • Atrial fibrillation Father    • Cirrhosis Father    • Depression Father    • Other Father         SCCA   • Alzheimer's disease Maternal Aunt    • Breast cancer Maternal Grandmother 80   • Atrial fibrillation Sister    • Ovarian cancer Neg Hx    • BRCA 1/2 Neg Hx    She has an aunt who reportedly had narcolepsy and had a grandfather with parkinsonism.    The following portions of the patient's history were reviewed and updated as appropriate: allergies, current medications, past family history, past medical history, past social history, past surgical history and problem list.    Review of Systems   Constitutional: Negative.    HENT: Positive for congestion, dental problem, sinus pressure, sinus pain and sneezing.    Eyes: Positive for itching.   Respiratory: Positive for wheezing.    Cardiovascular: Positive for chest pain.   Gastrointestinal: Negative.    Endocrine: Negative.    Genitourinary: Negative.    Musculoskeletal: Positive for back pain.   Skin: Negative.    Allergic/Immunologic: Positive for environmental allergies.   Neurological: Negative.     Hematological: Negative.    Psychiatric/Behavioral: Negative.    [place] score is 3/24    Objective     There were no vitals taken for this visit.     Physical Exam  Patient appears awake and alert.  She does not appear to be in respiratory distress.  Her stated weight is 238 pounds.  Her height 5 feet 6 inches.  This gives her a body mass index of 37.    Assessment/Plan   Diagnoses and all orders for this visit:    Snoring  -     Home Sleep Study; Future    Obstructive sleep apnea, adult  -     Home Sleep Study; Future    Psychophysiological insomnia    Overweight    Patient has a history of snoring and nonrestorative sleep.  She is previously diagnosed with obstructive sleep apnea many years ago and is gained weight since then.  I suspect she still has significant obstructive sleep apnea.  We will plan to proceed to home sleep testing.  I have discussed possible therapies including CPAP, weight control, oral appliance, and surgery.  We have also discussed the long-term consequences of untreated obstructive sleep apnea.  She is encouraged to lose weight.  She is encouraged to avoid alcohol and sedatives close to bedtime.  She is encouraged to practice lateral position sleep.    Patient also apparently has longstanding problem with psychophysiologic insomnia and is been on Ambien now for 17 years.  At this point she probably has significant psychologic and physiologic dependency on the Ambien.  Our goal should be to eventually get her off this but will first try to get her sleeping better at night.  We will need to try weaning the Ambien very slowly to be successful.  We will need to get her to buy in  to the process.  We can address this later.    Total time: 30 minutes exclusive of procedures.         Ron Nunez MD [facility]  Sleep Medicine  Pulmonary and Critical Care Medicine

## 2020-06-25 ENCOUNTER — APPOINTMENT (OUTPATIENT)
Dept: CARDIOLOGY | Facility: HOSPITAL | Age: 60
End: 2020-06-25

## 2020-07-14 ENCOUNTER — APPOINTMENT (OUTPATIENT)
Dept: CARDIOLOGY | Facility: HOSPITAL | Age: 60
End: 2020-07-14

## 2020-07-20 ENCOUNTER — APPOINTMENT (OUTPATIENT)
Dept: CARDIOLOGY | Facility: HOSPITAL | Age: 60
End: 2020-07-20

## 2020-07-22 ENCOUNTER — APPOINTMENT (OUTPATIENT)
Dept: CARDIOLOGY | Facility: HOSPITAL | Age: 60
End: 2020-07-22

## 2020-07-23 RX ORDER — NAPROXEN 500 MG/1
TABLET ORAL
Qty: 30 TABLET | Refills: 5 | Status: SHIPPED | OUTPATIENT
Start: 2020-07-23 | End: 2021-01-20 | Stop reason: SDUPTHER

## 2020-07-24 DIAGNOSIS — E03.9 ACQUIRED HYPOTHYROIDISM: ICD-10-CM

## 2020-07-24 RX ORDER — LEVOTHYROXINE SODIUM 0.15 MG/1
150 TABLET ORAL DAILY
Qty: 30 TABLET | Refills: 2 | Status: SHIPPED | OUTPATIENT
Start: 2020-07-24 | End: 2021-01-20 | Stop reason: SDUPTHER

## 2020-07-31 DIAGNOSIS — E78.00 PURE HYPERCHOLESTEROLEMIA: Primary | ICD-10-CM

## 2020-07-31 DIAGNOSIS — R07.2 PRECORDIAL PAIN: ICD-10-CM

## 2020-07-31 DIAGNOSIS — R00.2 PALPITATIONS: ICD-10-CM

## 2020-07-31 DIAGNOSIS — R53.83 OTHER FATIGUE: ICD-10-CM

## 2020-08-04 ENCOUNTER — APPOINTMENT (OUTPATIENT)
Dept: CARDIOLOGY | Facility: HOSPITAL | Age: 60
End: 2020-08-04

## 2020-08-04 ENCOUNTER — HOSPITAL ENCOUNTER (OUTPATIENT)
Dept: CARDIOLOGY | Facility: HOSPITAL | Age: 60
Discharge: HOME | End: 2020-08-04
Admitting: NURSE PRACTITIONER

## 2020-08-04 VITALS
HEIGHT: 66 IN | HEART RATE: 81 BPM | SYSTOLIC BLOOD PRESSURE: 150 MMHG | WEIGHT: 238 LBS | DIASTOLIC BLOOD PRESSURE: 90 MMHG | BODY MASS INDEX: 38.25 KG/M2

## 2020-08-04 DIAGNOSIS — R07.2 PRECORDIAL PAIN: ICD-10-CM

## 2020-08-04 DIAGNOSIS — E78.00 PURE HYPERCHOLESTEROLEMIA: ICD-10-CM

## 2020-08-04 DIAGNOSIS — R00.2 PALPITATIONS: ICD-10-CM

## 2020-08-04 DIAGNOSIS — R53.83 OTHER FATIGUE: ICD-10-CM

## 2020-08-04 LAB
BH CV STRESS BP STAGE 1: NORMAL
BH CV STRESS BP STAGE 2: NORMAL
BH CV STRESS DURATION MIN STAGE 1: 3
BH CV STRESS DURATION MIN STAGE 2: 3
BH CV STRESS DURATION MIN STAGE 3: 0
BH CV STRESS DURATION SEC STAGE 1: 0
BH CV STRESS DURATION SEC STAGE 2: 0
BH CV STRESS DURATION SEC STAGE 3: 46
BH CV STRESS GRADE STAGE 1: 10
BH CV STRESS GRADE STAGE 2: 12
BH CV STRESS GRADE STAGE 3: 14
BH CV STRESS HR STAGE 1: 130
BH CV STRESS HR STAGE 2: 157
BH CV STRESS HR STAGE 3: 166
BH CV STRESS METS STAGE 1: 5
BH CV STRESS METS STAGE 2: 7.5
BH CV STRESS METS STAGE 3: 10
BH CV STRESS O2 STAGE 1: 98
BH CV STRESS O2 STAGE 2: 93
BH CV STRESS PROTOCOL 1: NORMAL
BH CV STRESS RECOVERY BP: NORMAL MMHG
BH CV STRESS RECOVERY HR: 96 BPM
BH CV STRESS RECOVERY O2: 97 %
BH CV STRESS SPEED STAGE 1: 1.7
BH CV STRESS SPEED STAGE 2: 2.5
BH CV STRESS SPEED STAGE 3: 3.4
BH CV STRESS STAGE 1: 1
BH CV STRESS STAGE 2: 2
BH CV STRESS STAGE 3: 3
MAXIMAL PREDICTED HEART RATE: 162 BPM
PERCENT MAX PREDICTED HR: 102.47 %
STRESS BASELINE BP: NORMAL MMHG
STRESS BASELINE HR: 81 BPM
STRESS O2 SAT REST: 98 %
STRESS PERCENT HR: 121 %
STRESS POST ESTIMATED WORKLOAD: 8.1 METS
STRESS POST EXERCISE DUR MIN: 6 MIN
STRESS POST EXERCISE DUR SEC: 46 SEC
STRESS POST O2 SAT PEAK: 93 %
STRESS POST PEAK BP: NORMAL MMHG
STRESS POST PEAK HR: 166 BPM
STRESS TARGET HR: 138 BPM

## 2020-08-04 PROCEDURE — 93018 CV STRESS TEST I&R ONLY: CPT | Performed by: INTERNAL MEDICINE

## 2020-08-04 PROCEDURE — 93017 CV STRESS TEST TRACING ONLY: CPT

## 2020-08-05 ENCOUNTER — TELEPHONE (OUTPATIENT)
Dept: CARDIOLOGY | Facility: HOSPITAL | Age: 60
End: 2020-08-05

## 2020-08-05 NOTE — TELEPHONE ENCOUNTER
Called patient on Michelle Aragon's APRN behalf to let her know that stress test was normal. Patient reassured by findings and has no further questions or concerns.  Advised patient to call with any new or worsening symptoms

## 2020-08-10 DIAGNOSIS — K21.9 GASTROESOPHAGEAL REFLUX DISEASE WITHOUT ESOPHAGITIS: ICD-10-CM

## 2020-08-10 RX ORDER — OMEPRAZOLE 20 MG/1
CAPSULE, DELAYED RELEASE ORAL
Qty: 30 CAPSULE | Refills: 5 | Status: SHIPPED | OUTPATIENT
Start: 2020-08-10 | End: 2021-01-20 | Stop reason: SDUPTHER

## 2020-08-17 ENCOUNTER — APPOINTMENT (OUTPATIENT)
Dept: SLEEP MEDICINE | Facility: HOSPITAL | Age: 60
End: 2020-08-17

## 2020-08-27 ENCOUNTER — APPOINTMENT (OUTPATIENT)
Dept: CARDIOLOGY | Facility: HOSPITAL | Age: 60
End: 2020-08-27

## 2020-09-10 ENCOUNTER — TELEMEDICINE (OUTPATIENT)
Dept: INTERNAL MEDICINE | Facility: CLINIC | Age: 60
End: 2020-09-10

## 2020-09-10 DIAGNOSIS — F51.01 PRIMARY INSOMNIA: Primary | ICD-10-CM

## 2020-09-10 DIAGNOSIS — E03.9 ACQUIRED HYPOTHYROIDISM: ICD-10-CM

## 2020-09-10 DIAGNOSIS — E78.00 PURE HYPERCHOLESTEROLEMIA: ICD-10-CM

## 2020-09-10 DIAGNOSIS — R73.09 ABNORMAL GLUCOSE: ICD-10-CM

## 2020-09-10 PROCEDURE — 99214 OFFICE O/P EST MOD 30 MIN: CPT | Performed by: FAMILY MEDICINE

## 2020-09-10 RX ORDER — ZOLPIDEM TARTRATE 10 MG/1
10 TABLET ORAL NIGHTLY PRN
Qty: 30 TABLET | Refills: 2 | Status: SHIPPED | OUTPATIENT
Start: 2020-09-10 | End: 2020-11-10 | Stop reason: SDUPTHER

## 2020-09-10 RX ORDER — PRAVASTATIN SODIUM 20 MG
20 TABLET ORAL
Qty: 30 TABLET | Refills: 5 | Status: SHIPPED | OUTPATIENT
Start: 2020-09-10 | End: 2021-01-20 | Stop reason: SDUPTHER

## 2020-09-10 NOTE — PROGRESS NOTES
Subjective   Ana Lincoln is a 60 y.o. female.     Chief Complaint   Patient presents with   • Hyperlipidemia   • Hypothyroidism   • Insomnia   This was an audio and video enabled telemedicine encounter.    You have chosen to receive care through a telehealth visit.  Do you consent to use a video/audio connection for your medical care today? Yes      There were no vitals taken for this visit.      Insomnia   This is a chronic problem. The current episode started more than 1 year ago. The problem occurs constantly. The problem has been unchanged. Associated symptoms include congestion (allergies). Pertinent negatives include no abdominal pain, anorexia, arthralgias, change in bowel habit, chest pain, chills, coughing, diaphoresis, fatigue, fever, headaches, joint swelling, myalgias, nausea, neck pain, numbness, rash, sore throat, swollen glands, urinary symptoms, vertigo, visual change, vomiting or weakness. Nothing aggravates the symptoms. Treatments tried: ambien. The treatment provided significant relief.   Hyperlipidemia   This is a chronic problem. The current episode started more than 1 year ago. The problem is controlled. Recent lipid tests were reviewed and are normal. Exacerbating diseases include hypothyroidism and obesity. She has no history of chronic renal disease, diabetes, liver disease or nephrotic syndrome. There are no known factors aggravating her hyperlipidemia. Pertinent negatives include no chest pain, focal sensory loss, focal weakness, leg pain, myalgias or shortness of breath. Current antihyperlipidemic treatment includes statins. The current treatment provides significant improvement of lipids. Risk factors for coronary artery disease include dyslipidemia, post-menopausal and obesity.   Hypothyroidism   This is a chronic problem. The current episode started more than 1 year ago. The problem occurs constantly. The problem has been unchanged. Associated symptoms include congestion  (allergies). Pertinent negatives include no abdominal pain, anorexia, arthralgias, change in bowel habit, chest pain, chills, coughing, diaphoresis, fatigue, fever, headaches, joint swelling, myalgias, nausea, neck pain, numbness, rash, sore throat, swollen glands, urinary symptoms, vertigo, visual change, vomiting or weakness. Treatments tried: thyroid. The treatment provided significant relief.    pt's coworker was exposed to Covid, and pt sent coworker home yesterday with pending covid results.     Pt has enough thyroid med at home. Pt is on levothyroxine and is stable. Pt will call when she needs a refill.   Pt had a normal stress test.    Pt cancelled sleep study because of cost.  Last blood sugar was elevated.    The following portions of the patient's history were reviewed and updated as appropriate: allergies, current medications, past family history, past medical history, past social history, past surgical history and problem list.    Past Medical History:   Diagnosis Date   • Anxiety    • Calculus of kidney    • Carbuncle and furuncle of buttock 08/16/2012   • Chalazion    • GERD (gastroesophageal reflux disease)    • Goiter    • H/O bone density study 03/28/2018    nl   • Hyperlipidemia    • Hypothyroid    • Insomnia    • Persistent disorder of initiating or maintaining sleep    • Routine general medical examination at a health care facility 02/19/2013   • Routine general medical examination at a health care facility 02/16/2012   • Sacroiliac pain    • Sicca syndrome (CMS/HCC)    • Sleep apnea    • Visit for routine gyn exam 02/16/2012      Past Surgical History:   Procedure Laterality Date   • REPLACEMENT TOTAL KNEE BILATERAL     • TOTAL ABDOMINAL HYSTERECTOMY WITH SALPINGO OOPHORECTOMY Bilateral 2002      Family History   Problem Relation Age of Onset   • Thyroid disease Mother    • Memory loss Mother    • Alzheimer's disease Mother    • Other Mother         BCCA; Blood Clot   • Heart disease Father     • Atrial fibrillation Father    • Cirrhosis Father    • Depression Father    • Other Father         SCCA   • Alzheimer's disease Maternal Aunt    • Breast cancer Maternal Grandmother 80   • Atrial fibrillation Sister    • Ovarian cancer Neg Hx    • BRCA 1/2 Neg Hx       Social History     Socioeconomic History   • Marital status:      Spouse name: Not on file   • Number of children: Not on file   • Years of education: Not on file   • Highest education level: Not on file   Tobacco Use   • Smoking status: Former Smoker   • Smokeless tobacco: Never Used   • Tobacco comment: quit 30 + years   Substance and Sexual Activity   • Alcohol use: Yes     Alcohol/week: 5.0 standard drinks     Types: 5 Glasses of wine per week     Frequency: 4 or more times a week     Drinks per session: 1 or 2     Binge frequency: Never     Comment: red wine   • Drug use: No   • Sexual activity: Yes     Partners: Male     Birth control/protection: Post-menopausal, Surgical   Social History Narrative    Caffeine: 2 20 oz tea daily      Allergies   Allergen Reactions   • Morphine And Related Swelling     Tongue swells, doesn't help pain   • Erythromycin Nausea Only   • Requip [Ropinirole Hcl] Nausea Only       Review of Systems   Constitutional: Negative.  Negative for chills, diaphoresis, fatigue and fever.   HENT: Positive for congestion (allergies), postnasal drip and rhinorrhea. Negative for ear pain, nosebleeds, sinus pressure, sneezing and sore throat.    Eyes: Negative.  Negative for redness and itching.   Respiratory: Negative.  Negative for cough, shortness of breath and wheezing.    Cardiovascular: Negative.  Negative for chest pain and palpitations.   Gastrointestinal: Negative.  Negative for abdominal pain, anorexia, change in bowel habit, constipation, diarrhea, nausea and vomiting.   Endocrine: Negative.  Negative for cold intolerance and heat intolerance.   Genitourinary: Negative.  Negative for dysuria, frequency,  hematuria and urgency.   Musculoskeletal: Negative.  Negative for arthralgias, back pain, joint swelling, myalgias and neck pain.   Skin: Negative.  Negative for color change and rash.   Allergic/Immunologic: Positive for environmental allergies (ragweed).   Neurological: Negative.  Negative for dizziness, vertigo, focal weakness, syncope, weakness, light-headedness, numbness and headaches.   Hematological: Negative.  Negative for adenopathy. Does not bruise/bleed easily.   Psychiatric/Behavioral: Negative for dysphoric mood. The patient has insomnia. The patient is not nervous/anxious.        Objective   Physical Exam   Constitutional: She is oriented to person, place, and time. She appears well-developed and well-nourished.   HENT:   Head: Normocephalic and atraumatic.   Right Ear: External ear normal.   Left Ear: External ear normal.   Eyes: EOM are normal.   Neck: Normal range of motion. Neck supple.   Pulmonary/Chest: No respiratory distress.   Neurological: She is alert and oriented to person, place, and time.   Skin: Skin is dry.   Psychiatric: She has a normal mood and affect. Her behavior is normal. Judgment and thought content normal.       Assessment/Plan   Ana was seen today for hyperlipidemia, hypothyroidism and insomnia.    Diagnoses and all orders for this visit:    Primary insomnia  -     zolpidem (AMBIEN) 10 MG tablet; Take 1 tablet by mouth At Night As Needed for Sleep. for sleep    Acquired hypothyroidism  -     TSH; Future  -     T4, Free; Future    Pure hypercholesterolemia  -     pravastatin (PRAVACHOL) 20 MG tablet; Take 1 tablet by mouth every night at bedtime.  -     Comprehensive Metabolic Panel; Future  -     Lipid Panel; Future    Abnormal glucose  -     Hemoglobin A1c; Future                   Current Outpatient Medications:   •  aspirin 81 MG EC tablet, Take 1 tablet by mouth Daily., Disp: 64 tablet, Rfl: 0  •  cetirizine (ZyrTEC) 10 MG tablet, Take 10 mg by mouth daily., Disp: ,  Rfl:   •  fluticasone (FLONASE) 50 MCG/ACT nasal spray, 2 sprays into each nostril Daily. Administer 2 sprays in each nostril for each dose., Disp: 16 g, Rfl: 5  •  levothyroxine (SYNTHROID, LEVOTHROID) 150 MCG tablet, TAKE 1 TABLET BY MOUTH DAILY, Disp: 30 tablet, Rfl: 2  •  naproxen (NAPROSYN) 500 MG tablet, TAKE 1 TABLET BY MOUTH EVERY EVENING AS NEEDED FOR MILD PAIN, Disp: 30 tablet, Rfl: 5  •  nitroglycerin (NITROSTAT) 0.4 MG SL tablet, Place 1 tablet under the tongue Every 5 (Five) Minutes As Needed for Chest Pain. Take no more than 3 doses in 15 minutes., Disp: 25 tablet, Rfl: 12  •  omeprazole (priLOSEC) 20 MG capsule, TAKE 1 CAPSULE BY MOUTH ONCE DAILY, Disp: 30 capsule, Rfl: 5  •  pravastatin (PRAVACHOL) 20 MG tablet, Take 1 tablet by mouth every night at bedtime., Disp: 30 tablet, Rfl: 5  •  zolpidem (AMBIEN) 10 MG tablet, Take 1 tablet by mouth At Night As Needed for Sleep. for sleep, Disp: 30 tablet, Rfl: 2    Return in about 3 months (around 12/10/2020), or if symptoms worsen or fail to improve, for Recheck, Annual controls.     This visit has been rescheduled as a video visit to comply with patient safety concerns in accordance with CDC recommendations. Total time of discussion was 15 minutes.

## 2020-11-06 ENCOUNTER — LAB (OUTPATIENT)
Dept: LAB | Facility: HOSPITAL | Age: 60
End: 2020-11-06

## 2020-11-06 ENCOUNTER — TRANSCRIBE ORDERS (OUTPATIENT)
Dept: LAB | Facility: HOSPITAL | Age: 60
End: 2020-11-06

## 2020-11-06 DIAGNOSIS — E03.9 ACQUIRED HYPOTHYROIDISM: ICD-10-CM

## 2020-11-06 DIAGNOSIS — E78.00 PURE HYPERCHOLESTEROLEMIA: ICD-10-CM

## 2020-11-06 DIAGNOSIS — R73.09 ABNORMAL GLUCOSE: ICD-10-CM

## 2020-11-06 LAB
ALBUMIN SERPL-MCNC: 4.5 G/DL (ref 3.5–5.2)
ALBUMIN/GLOB SERPL: 1.7 G/DL
ALP SERPL-CCNC: 73 U/L (ref 39–117)
ALT SERPL W P-5'-P-CCNC: 16 U/L (ref 1–33)
ANION GAP SERPL CALCULATED.3IONS-SCNC: 8.2 MMOL/L (ref 5–15)
AST SERPL-CCNC: 18 U/L (ref 1–32)
BILIRUB SERPL-MCNC: 0.4 MG/DL (ref 0–1.2)
BUN SERPL-MCNC: 14 MG/DL (ref 6–20)
BUN/CREAT SERPL: 20.6 (ref 7–25)
CALCIUM SPEC-SCNC: 9.3 MG/DL (ref 8.6–10.5)
CHLORIDE SERPL-SCNC: 102 MMOL/L (ref 98–107)
CHOLEST SERPL-MCNC: 191 MG/DL (ref 0–200)
CO2 SERPL-SCNC: 27.8 MMOL/L (ref 22–29)
CREAT SERPL-MCNC: 0.68 MG/DL (ref 0.57–1)
GFR SERPL CREATININE-BSD FRML MDRD: 89 ML/MIN/1.73
GLOBULIN UR ELPH-MCNC: 2.7 GM/DL
GLUCOSE SERPL-MCNC: 95 MG/DL (ref 65–99)
HBA1C MFR BLD: 5.65 % (ref 4.8–5.6)
HDLC SERPL-MCNC: 58 MG/DL (ref 40–60)
LDLC SERPL CALC-MCNC: 115 MG/DL (ref 0–100)
LDLC/HDLC SERPL: 1.95 {RATIO}
POTASSIUM SERPL-SCNC: 4.2 MMOL/L (ref 3.5–5.2)
PROT SERPL-MCNC: 7.2 G/DL (ref 6–8.5)
SODIUM SERPL-SCNC: 138 MMOL/L (ref 136–145)
T4 FREE SERPL-MCNC: 1.51 NG/DL (ref 0.93–1.7)
TRIGL SERPL-MCNC: 99 MG/DL (ref 0–150)
TSH SERPL DL<=0.05 MIU/L-ACNC: 0.94 UIU/ML (ref 0.27–4.2)
VLDLC SERPL-MCNC: 18 MG/DL (ref 5–40)

## 2020-11-06 PROCEDURE — 84443 ASSAY THYROID STIM HORMONE: CPT

## 2020-11-06 PROCEDURE — 84439 ASSAY OF FREE THYROXINE: CPT

## 2020-11-06 PROCEDURE — 80053 COMPREHEN METABOLIC PANEL: CPT

## 2020-11-06 PROCEDURE — 80061 LIPID PANEL: CPT

## 2020-11-06 PROCEDURE — 83036 HEMOGLOBIN GLYCOSYLATED A1C: CPT

## 2020-11-10 ENCOUNTER — TELEMEDICINE (OUTPATIENT)
Dept: INTERNAL MEDICINE | Facility: CLINIC | Age: 60
End: 2020-11-10

## 2020-11-10 DIAGNOSIS — F51.01 PRIMARY INSOMNIA: ICD-10-CM

## 2020-11-10 PROCEDURE — 99213 OFFICE O/P EST LOW 20 MIN: CPT | Performed by: FAMILY MEDICINE

## 2020-11-10 RX ORDER — ZOLPIDEM TARTRATE 10 MG/1
10 TABLET ORAL NIGHTLY PRN
Qty: 30 TABLET | Refills: 2 | Status: SHIPPED | OUTPATIENT
Start: 2020-11-10 | End: 2021-01-20 | Stop reason: SDUPTHER

## 2020-11-10 RX ORDER — SODIUM PHOSPHATE,MONO-DIBASIC 19G-7G/118
2 ENEMA (ML) RECTAL DAILY
COMMUNITY

## 2020-11-10 NOTE — PROGRESS NOTES
Subjective   Ana Lincoln is a 60 y.o. female.     Chief Complaint   Patient presents with   • Insomnia       There were no vitals taken for this visit.    This was an audio and video enabled telemedicine encounter.    You have chosen to receive care through a telehealth visit.  Do you consent to use a video/audio connection for your medical care today? Yes    Insomnia  This is a chronic problem. The current episode started more than 1 year ago. The problem occurs constantly. The problem has been unchanged. Associated symptoms include arthralgias (chronic with arthritis), congestion and coughing (pt attributes to fall allergies). Pertinent negatives include no abdominal pain, anorexia, change in bowel habit, chest pain, chills, diaphoresis, fatigue, fever, headaches, joint swelling, myalgias, nausea, neck pain, numbness, rash, sore throat, swollen glands, urinary symptoms, vertigo, visual change, vomiting or weakness. Nothing aggravates the symptoms. Treatments tried: ambien. The treatment provided significant relief.      Pt needs refill of ambien. Pt was exposed possibly to Covid at work.   Pt checked BP 1 week ago, reports that it ws ok.  Pt had negative stress test in August.   Reviewed lab with pt, HGA1c mildly elevated. Pt has adjusted her diet and decreased sugar.     Pt is up to date on flu vaccine    The following portions of the patient's history were reviewed and updated as appropriate: allergies, current medications, past family history, past medical history, past social history, past surgical history and problem list.    Past Medical History:   Diagnosis Date   • Anxiety    • Calculus of kidney    • Carbuncle and furuncle of buttock 08/16/2012   • Chalazion    • GERD (gastroesophageal reflux disease)    • Goiter    • H/O bone density study 03/28/2018    nl   • Hyperlipidemia    • Hypothyroid    • Insomnia    • Persistent disorder of initiating or maintaining sleep    • Routine general medical  examination at a health care facility 02/19/2013   • Routine general medical examination at a health care facility 02/16/2012   • Sacroiliac pain    • Sicca syndrome (CMS/HCC)    • Sleep apnea    • Visit for routine gyn exam 02/16/2012      Past Surgical History:   Procedure Laterality Date   • REPLACEMENT TOTAL KNEE BILATERAL     • TOTAL ABDOMINAL HYSTERECTOMY WITH SALPINGO OOPHORECTOMY Bilateral 2002      Family History   Problem Relation Age of Onset   • Thyroid disease Mother    • Memory loss Mother    • Alzheimer's disease Mother    • Other Mother         BCCA; Blood Clot   • Heart disease Father    • Atrial fibrillation Father    • Cirrhosis Father    • Depression Father    • Other Father         SCCA   • Alzheimer's disease Maternal Aunt    • Breast cancer Maternal Grandmother 80   • Atrial fibrillation Sister    • Ovarian cancer Neg Hx    • BRCA 1/2 Neg Hx       Social History     Socioeconomic History   • Marital status:      Spouse name: Not on file   • Number of children: Not on file   • Years of education: Not on file   • Highest education level: Not on file   Tobacco Use   • Smoking status: Former Smoker   • Smokeless tobacco: Never Used   • Tobacco comment: quit 30 + years   Substance and Sexual Activity   • Alcohol use: Yes     Alcohol/week: 5.0 standard drinks     Types: 5 Glasses of wine per week     Frequency: 4 or more times a week     Drinks per session: 1 or 2     Binge frequency: Never     Comment: red wine   • Drug use: No   • Sexual activity: Yes     Partners: Male     Birth control/protection: Post-menopausal, Surgical   Social History Narrative    Caffeine: 2 20 oz tea daily      Allergies   Allergen Reactions   • Morphine And Related Swelling     Tongue swells, doesn't help pain   • Erythromycin Nausea Only   • Requip [Ropinirole Hcl] Nausea Only       Review of Systems   Constitutional: Negative.  Negative for chills, diaphoresis, fatigue and fever.   HENT: Positive for congestion,  postnasal drip, rhinorrhea and sneezing. Negative for ear pain, nosebleeds, sinus pressure and sore throat.    Eyes: Positive for itching. Negative for redness.   Respiratory: Positive for cough (pt attributes to fall allergies). Negative for shortness of breath and wheezing.    Cardiovascular: Negative.  Negative for chest pain and palpitations.   Gastrointestinal: Negative.  Negative for abdominal pain, anorexia, change in bowel habit, constipation, diarrhea, nausea and vomiting.   Endocrine: Negative.  Negative for cold intolerance and heat intolerance.   Genitourinary: Negative.  Negative for dysuria, frequency, hematuria and urgency.   Musculoskeletal: Positive for arthralgias (chronic with arthritis). Negative for back pain, gait problem, joint swelling, myalgias and neck pain.   Skin: Negative.  Negative for color change and rash.   Allergic/Immunologic: Positive for environmental allergies.   Neurological: Negative.  Negative for dizziness, vertigo, syncope, weakness, light-headedness, numbness and headaches.   Hematological: Negative.  Negative for adenopathy. Does not bruise/bleed easily.   Psychiatric/Behavioral: Positive for sleep disturbance. Negative for dysphoric mood. The patient has insomnia. The patient is not nervous/anxious.        Objective   Physical Exam  Constitutional:       Appearance: Normal appearance.   HENT:      Head: Normocephalic.      Nose: Nose normal.   Eyes:      Extraocular Movements: Extraocular movements intact.   Pulmonary:      Effort: No respiratory distress.   Neurological:      Mental Status: She is alert and oriented to person, place, and time.   Psychiatric:         Mood and Affect: Mood normal.         Behavior: Behavior normal.         Thought Content: Thought content normal.         Judgment: Judgment normal.         Assessment/Plan   Diagnoses and all orders for this visit:    1. Primary insomnia  -     zolpidem (AMBIEN) 10 MG tablet; Take 1 tablet by mouth At Night  As Needed for Sleep. for sleep  Dispense: 30 tablet; Refill: 2          Discussed Shingrix vaccine and tdap with pt,  that are currently available at the pharmacy.            Current Outpatient Medications:   •  glucosamine-chondroitin 500-400 MG capsule capsule, Take 2 capsules by mouth Daily., Disp: , Rfl:   •  aspirin 81 MG EC tablet, Take 1 tablet by mouth Daily., Disp: 64 tablet, Rfl: 0  •  cetirizine (ZyrTEC) 10 MG tablet, Take 10 mg by mouth daily., Disp: , Rfl:   •  fluticasone (FLONASE) 50 MCG/ACT nasal spray, 2 sprays into each nostril Daily. Administer 2 sprays in each nostril for each dose., Disp: 16 g, Rfl: 5  •  levothyroxine (SYNTHROID, LEVOTHROID) 150 MCG tablet, TAKE 1 TABLET BY MOUTH DAILY, Disp: 30 tablet, Rfl: 2  •  naproxen (NAPROSYN) 500 MG tablet, TAKE 1 TABLET BY MOUTH EVERY EVENING AS NEEDED FOR MILD PAIN, Disp: 30 tablet, Rfl: 5  •  omeprazole (priLOSEC) 20 MG capsule, TAKE 1 CAPSULE BY MOUTH ONCE DAILY, Disp: 30 capsule, Rfl: 5  •  pravastatin (PRAVACHOL) 20 MG tablet, Take 1 tablet by mouth every night at bedtime., Disp: 30 tablet, Rfl: 5  •  zolpidem (AMBIEN) 10 MG tablet, Take 1 tablet by mouth At Night As Needed for Sleep. for sleep, Disp: 30 tablet, Rfl: 2    Return in about 3 months (around 2/10/2021), or if symptoms worsen or fail to improve, for Recheck, Annual refills.     This visit has been rescheduled as a video visit to comply with patient safety concerns in accordance with CDC recommendations. Total time of discussion was 15 minutes.      Recent Results (from the past 168 hour(s))   Comprehensive Metabolic Panel    Collection Time: 11/06/20  8:52 AM    Specimen: Blood   Result Value Ref Range    Glucose 95 65 - 99 mg/dL    BUN 14 6 - 20 mg/dL    Creatinine 0.68 0.57 - 1.00 mg/dL    Sodium 138 136 - 145 mmol/L    Potassium 4.2 3.5 - 5.2 mmol/L    Chloride 102 98 - 107 mmol/L    CO2 27.8 22.0 - 29.0 mmol/L    Calcium 9.3 8.6 - 10.5 mg/dL    Total Protein 7.2 6.0 - 8.5 g/dL     Albumin 4.50 3.50 - 5.20 g/dL    ALT (SGPT) 16 1 - 33 U/L    AST (SGOT) 18 1 - 32 U/L    Alkaline Phosphatase 73 39 - 117 U/L    Total Bilirubin 0.4 0.0 - 1.2 mg/dL    eGFR Non African Amer 89 >60 mL/min/1.73    Globulin 2.7 gm/dL    A/G Ratio 1.7 g/dL    BUN/Creatinine Ratio 20.6 7.0 - 25.0    Anion Gap 8.2 5.0 - 15.0 mmol/L   Lipid Panel    Collection Time: 11/06/20  8:52 AM    Specimen: Blood   Result Value Ref Range    Total Cholesterol 191 0 - 200 mg/dL    Triglycerides 99 0 - 150 mg/dL    HDL Cholesterol 58 40 - 60 mg/dL    LDL Cholesterol  115 (H) 0 - 100 mg/dL    VLDL Cholesterol 18 5 - 40 mg/dL    LDL/HDL Ratio 1.95    Hemoglobin A1c    Collection Time: 11/06/20  8:52 AM    Specimen: Blood   Result Value Ref Range    Hemoglobin A1C 5.65 (H) 4.80 - 5.60 %   TSH    Collection Time: 11/06/20  8:52 AM    Specimen: Blood   Result Value Ref Range    TSH 0.936 0.270 - 4.200 uIU/mL   T4, Free    Collection Time: 11/06/20  8:52 AM    Specimen: Blood   Result Value Ref Range    Free T4 1.51 0.93 - 1.70 ng/dL

## 2021-01-07 DIAGNOSIS — F51.01 PRIMARY INSOMNIA: ICD-10-CM

## 2021-01-07 NOTE — TELEPHONE ENCOUNTER
PT CALLED REQUESTING  A TEMP REFILL OF:  zolpidem (AMBIEN) 10 MG tablet  UNTIL APPT ON 1/20    [facility] [address] - [phone]  - [phone] FX     PT CALL BACK NUMBER: [id number]

## 2021-01-08 RX ORDER — ZOLPIDEM TARTRATE 10 MG/1
10 TABLET ORAL NIGHTLY PRN
Qty: 30 TABLET | Refills: 2 | OUTPATIENT
Start: 2021-01-08

## 2021-01-20 ENCOUNTER — OFFICE VISIT (OUTPATIENT)
Dept: INTERNAL MEDICINE | Facility: CLINIC | Age: 61
End: 2021-01-20

## 2021-01-20 ENCOUNTER — LAB (OUTPATIENT)
Dept: LAB | Facility: HOSPITAL | Age: 61
End: 2021-01-20

## 2021-01-20 VITALS
OXYGEN SATURATION: 97 % | TEMPERATURE: 97.5 F | DIASTOLIC BLOOD PRESSURE: 88 MMHG | SYSTOLIC BLOOD PRESSURE: 130 MMHG | HEART RATE: 89 BPM | BODY MASS INDEX: 40.15 KG/M2 | HEIGHT: 65 IN | WEIGHT: 241 LBS

## 2021-01-20 DIAGNOSIS — Z79.899 ENCOUNTER FOR LONG-TERM CURRENT USE OF MEDICATION: ICD-10-CM

## 2021-01-20 DIAGNOSIS — E78.00 PURE HYPERCHOLESTEROLEMIA: ICD-10-CM

## 2021-01-20 DIAGNOSIS — Z00.00 ENCOUNTER FOR PHYSICAL EXAMINATION: Primary | ICD-10-CM

## 2021-01-20 DIAGNOSIS — R73.09 ABNORMAL GLUCOSE: ICD-10-CM

## 2021-01-20 DIAGNOSIS — Z13.820 SCREENING FOR OSTEOPOROSIS: ICD-10-CM

## 2021-01-20 DIAGNOSIS — E03.9 ACQUIRED HYPOTHYROIDISM: ICD-10-CM

## 2021-01-20 DIAGNOSIS — Z00.00 ENCOUNTER FOR PHYSICAL EXAMINATION: ICD-10-CM

## 2021-01-20 DIAGNOSIS — K21.9 GASTROESOPHAGEAL REFLUX DISEASE WITHOUT ESOPHAGITIS: ICD-10-CM

## 2021-01-20 DIAGNOSIS — F51.01 PRIMARY INSOMNIA: ICD-10-CM

## 2021-01-20 LAB
ALBUMIN SERPL-MCNC: 4.4 G/DL (ref 3.5–5.2)
ALBUMIN/GLOB SERPL: 1.6 G/DL
ALP SERPL-CCNC: 77 U/L (ref 39–117)
ALT SERPL W P-5'-P-CCNC: 12 U/L (ref 1–33)
AMPHET+METHAMPHET UR QL: NEGATIVE
AMPHETAMINES UR QL: NEGATIVE
ANION GAP SERPL CALCULATED.3IONS-SCNC: 10.7 MMOL/L (ref 5–15)
AST SERPL-CCNC: 19 U/L (ref 1–32)
BARBITURATES UR QL SCN: NEGATIVE
BASOPHILS # BLD AUTO: 0.04 10*3/MM3 (ref 0–0.2)
BASOPHILS NFR BLD AUTO: 0.7 % (ref 0–1.5)
BENZODIAZ UR QL SCN: NEGATIVE
BILIRUB BLD-MCNC: NEGATIVE MG/DL
BILIRUB SERPL-MCNC: 0.4 MG/DL (ref 0–1.2)
BUN SERPL-MCNC: 12 MG/DL (ref 6–20)
BUN/CREAT SERPL: 20 (ref 7–25)
BUPRENORPHINE SERPL-MCNC: NEGATIVE NG/ML
CALCIUM SPEC-SCNC: 9 MG/DL (ref 8.6–10.5)
CANNABINOIDS SERPL QL: NEGATIVE
CHLORIDE SERPL-SCNC: 104 MMOL/L (ref 98–107)
CHOLEST SERPL-MCNC: 183 MG/DL (ref 0–200)
CLARITY, POC: CLEAR
CO2 SERPL-SCNC: 25.3 MMOL/L (ref 22–29)
COCAINE UR QL: NEGATIVE
COLOR UR: YELLOW
CREAT SERPL-MCNC: 0.6 MG/DL (ref 0.57–1)
DEPRECATED RDW RBC AUTO: 40.8 FL (ref 37–54)
EOSINOPHIL # BLD AUTO: 0.14 10*3/MM3 (ref 0–0.4)
EOSINOPHIL NFR BLD AUTO: 2.3 % (ref 0.3–6.2)
ERYTHROCYTE [DISTWIDTH] IN BLOOD BY AUTOMATED COUNT: 12.6 % (ref 12.3–15.4)
GFR SERPL CREATININE-BSD FRML MDRD: 103 ML/MIN/1.73
GLOBULIN UR ELPH-MCNC: 2.7 GM/DL
GLUCOSE SERPL-MCNC: 91 MG/DL (ref 65–99)
GLUCOSE UR STRIP-MCNC: NEGATIVE MG/DL
HBA1C MFR BLD: 5.63 % (ref 4.8–5.6)
HCT VFR BLD AUTO: 40.4 % (ref 34–46.6)
HDLC SERPL-MCNC: 58 MG/DL (ref 40–60)
HGB BLD-MCNC: 13.7 G/DL (ref 12–15.9)
IMM GRANULOCYTES # BLD AUTO: 0.04 10*3/MM3 (ref 0–0.05)
IMM GRANULOCYTES NFR BLD AUTO: 0.7 % (ref 0–0.5)
KETONES UR QL: NEGATIVE
LDLC SERPL CALC-MCNC: 104 MG/DL (ref 0–100)
LDLC/HDLC SERPL: 1.75 {RATIO}
LEUKOCYTE EST, POC: NEGATIVE
LYMPHOCYTES # BLD AUTO: 2.36 10*3/MM3 (ref 0.7–3.1)
LYMPHOCYTES NFR BLD AUTO: 39.3 % (ref 19.6–45.3)
MCH RBC QN AUTO: 30.1 PG (ref 26.6–33)
MCHC RBC AUTO-ENTMCNC: 33.9 G/DL (ref 31.5–35.7)
MCV RBC AUTO: 88.8 FL (ref 79–97)
METHADONE UR QL SCN: NEGATIVE
MONOCYTES # BLD AUTO: 0.4 10*3/MM3 (ref 0.1–0.9)
MONOCYTES NFR BLD AUTO: 6.7 % (ref 5–12)
NEUTROPHILS NFR BLD AUTO: 3.02 10*3/MM3 (ref 1.7–7)
NEUTROPHILS NFR BLD AUTO: 50.3 % (ref 42.7–76)
NITRITE UR-MCNC: NEGATIVE MG/ML
NRBC BLD AUTO-RTO: 0 /100 WBC (ref 0–0.2)
OPIATES UR QL: NEGATIVE
OXYCODONE UR QL SCN: NEGATIVE
PCP UR QL SCN: NEGATIVE
PH UR: 6.5 [PH] (ref 5–8)
PLATELET # BLD AUTO: 141 10*3/MM3 (ref 140–450)
PMV BLD AUTO: 11.8 FL (ref 6–12)
POTASSIUM SERPL-SCNC: 3.9 MMOL/L (ref 3.5–5.2)
PROPOXYPH UR QL: NEGATIVE
PROT SERPL-MCNC: 7.1 G/DL (ref 6–8.5)
PROT UR STRIP-MCNC: NEGATIVE MG/DL
RBC # BLD AUTO: 4.55 10*6/MM3 (ref 3.77–5.28)
RBC # UR STRIP: NEGATIVE /UL
SODIUM SERPL-SCNC: 140 MMOL/L (ref 136–145)
SP GR UR: 1.01 (ref 1–1.03)
T4 FREE SERPL-MCNC: 1.63 NG/DL (ref 0.93–1.7)
TRICYCLICS UR QL SCN: NEGATIVE
TRIGL SERPL-MCNC: 117 MG/DL (ref 0–150)
TSH SERPL DL<=0.05 MIU/L-ACNC: 1.76 UIU/ML (ref 0.27–4.2)
UROBILINOGEN UR QL: NORMAL
VLDLC SERPL-MCNC: 21 MG/DL (ref 5–40)
WBC # BLD AUTO: 6 10*3/MM3 (ref 3.4–10.8)

## 2021-01-20 PROCEDURE — 80053 COMPREHEN METABOLIC PANEL: CPT | Performed by: FAMILY MEDICINE

## 2021-01-20 PROCEDURE — 99396 PREV VISIT EST AGE 40-64: CPT | Performed by: FAMILY MEDICINE

## 2021-01-20 PROCEDURE — 85025 COMPLETE CBC W/AUTO DIFF WBC: CPT | Performed by: FAMILY MEDICINE

## 2021-01-20 PROCEDURE — 81003 URINALYSIS AUTO W/O SCOPE: CPT | Performed by: FAMILY MEDICINE

## 2021-01-20 PROCEDURE — 80306 DRUG TEST PRSMV INSTRMNT: CPT | Performed by: FAMILY MEDICINE

## 2021-01-20 PROCEDURE — 83036 HEMOGLOBIN GLYCOSYLATED A1C: CPT | Performed by: FAMILY MEDICINE

## 2021-01-20 PROCEDURE — 84443 ASSAY THYROID STIM HORMONE: CPT | Performed by: FAMILY MEDICINE

## 2021-01-20 PROCEDURE — 80061 LIPID PANEL: CPT | Performed by: FAMILY MEDICINE

## 2021-01-20 PROCEDURE — 84439 ASSAY OF FREE THYROXINE: CPT | Performed by: FAMILY MEDICINE

## 2021-01-20 RX ORDER — ZOLPIDEM TARTRATE 10 MG/1
10 TABLET ORAL NIGHTLY PRN
Qty: 30 TABLET | Refills: 2 | Status: SHIPPED | OUTPATIENT
Start: 2021-01-20 | End: 2021-04-20 | Stop reason: SDUPTHER

## 2021-01-20 RX ORDER — OMEPRAZOLE 20 MG/1
20 CAPSULE, DELAYED RELEASE ORAL DAILY
Qty: 30 CAPSULE | Refills: 5 | Status: SHIPPED | OUTPATIENT
Start: 2021-01-20 | End: 2021-07-16

## 2021-01-20 RX ORDER — NAPROXEN 500 MG/1
TABLET ORAL
Qty: 30 TABLET | Refills: 5 | Status: SHIPPED | OUTPATIENT
Start: 2021-01-20

## 2021-01-20 RX ORDER — PRAVASTATIN SODIUM 20 MG
20 TABLET ORAL
Qty: 30 TABLET | Refills: 5 | Status: SHIPPED | OUTPATIENT
Start: 2021-01-20

## 2021-01-20 RX ORDER — LEVOTHYROXINE SODIUM 0.15 MG/1
150 TABLET ORAL DAILY
Qty: 30 TABLET | Refills: 5 | Status: SHIPPED | OUTPATIENT
Start: 2021-01-20 | End: 2021-07-16

## 2021-01-20 NOTE — PROGRESS NOTES
"Subjective   Ana Lincoln is a 60 y.o. female.     Chief Complaint   Patient presents with   • Annual Exam       Visit Vitals  /88 (BP Location: Right arm, Patient Position: Sitting, Cuff Size: Adult)   Pulse 89   Temp 97.5 °F (36.4 °C) (Infrared)   Ht 164.6 cm (64.8\")   Wt 109 kg (241 lb)   SpO2 97%   Breastfeeding No   BMI 40.35 kg/m²       Wt Readings from Last 3 Encounters:   01/20/21 109 kg (241 lb)   08/04/20 108 kg (238 lb)   06/15/20 108 kg (238 lb)       Wt Readings from Last 3 Encounters:   01/20/21 109 kg (241 lb)   08/04/20 108 kg (238 lb)   06/15/20 108 kg (238 lb)         History of Present Illness   Pt is here for annual exam.     The following portions of the patient's history were reviewed and updated as appropriate: allergies, current medications, past family history, past medical history, past social history, past surgical history and problem list.    Past Medical History:   Diagnosis Date   • Anxiety    • Calculus of kidney    • Carbuncle and furuncle of buttock 08/16/2012   • Chalazion    • GERD (gastroesophageal reflux disease)    • Goiter    • H/O bone density study 03/28/2018    nl   • Hyperlipidemia    • Hypothyroid    • Insomnia    • Persistent disorder of initiating or maintaining sleep    • Routine general medical examination at a health care facility 02/19/2013   • Routine general medical examination at a health care facility 02/16/2012   • Sacroiliac pain    • Sicca syndrome (CMS/HCC)    • Sleep apnea    • Visit for routine gyn exam 02/16/2012      Past Surgical History:   Procedure Laterality Date   • REPLACEMENT TOTAL KNEE BILATERAL     • TOTAL ABDOMINAL HYSTERECTOMY WITH SALPINGO OOPHORECTOMY Bilateral 2002      Family History   Problem Relation Age of Onset   • Thyroid disease Mother    • Memory loss Mother    • Alzheimer's disease Mother    • Other Mother         BCCA; Blood Clot   • Heart disease Father    • Atrial fibrillation Father    • Cirrhosis Father    • " Depression Father    • Other Father         SCCA   • Alzheimer's disease Maternal Aunt    • Breast cancer Maternal Grandmother 80   • Atrial fibrillation Sister    • Ovarian cancer Neg Hx    • BRCA 1/2 Neg Hx       Social History     Socioeconomic History   • Marital status:      Spouse name: Not on file   • Number of children: Not on file   • Years of education: Not on file   • Highest education level: Not on file   Tobacco Use   • Smoking status: Former Smoker   • Smokeless tobacco: Never Used   • Tobacco comment: quit 30 + years   Substance and Sexual Activity   • Alcohol use: Yes     Alcohol/week: 5.0 standard drinks     Types: 5 Glasses of wine per week     Frequency: 4 or more times a week     Drinks per session: 1 or 2     Binge frequency: Never     Comment: red wine   • Drug use: No   • Sexual activity: Yes     Partners: Male     Birth control/protection: Post-menopausal, Surgical   Social History Narrative    Caffeine: 2 20 oz tea daily      Allergies   Allergen Reactions   • Morphine And Related Swelling     Tongue swells, doesn't help pain   • Erythromycin Nausea Only   • Requip [Ropinirole Hcl] Nausea Only       Review of Systems   Constitutional: Negative.  Negative for activity change, appetite change, chills, diaphoresis, fatigue, fever and unexpected weight change.   HENT: Positive for postnasal drip, rhinorrhea and sneezing. Negative for congestion, dental problem, drooling, ear discharge, ear pain, facial swelling, hearing loss, mouth sores, nosebleeds, sinus pressure, sinus pain, sore throat, tinnitus, trouble swallowing and voice change.    Eyes: Negative.  Negative for photophobia, pain, discharge, redness, itching and visual disturbance.   Respiratory: Negative.  Negative for apnea, cough, choking, chest tightness, shortness of breath, wheezing and stridor.    Cardiovascular: Negative.  Negative for chest pain, palpitations and leg swelling.   Gastrointestinal: Negative.  Negative for  abdominal distention, abdominal pain, anal bleeding, blood in stool, constipation, diarrhea, nausea, rectal pain and vomiting.   Endocrine: Negative.  Negative for cold intolerance, heat intolerance, polydipsia, polyphagia and polyuria.   Genitourinary: Negative.  Negative for decreased urine volume, difficulty urinating, dyspareunia, dysuria, enuresis, flank pain, frequency, genital sores, hematuria, menstrual problem, pelvic pain, urgency, vaginal bleeding, vaginal discharge and vaginal pain.   Musculoskeletal: Positive for arthralgias (hips). Negative for back pain, gait problem, joint swelling, myalgias, neck pain and neck stiffness.   Skin: Negative.  Negative for color change, pallor, rash and wound.   Allergic/Immunologic: Positive for environmental allergies. Negative for food allergies and immunocompromised state.   Neurological: Negative.  Negative for dizziness, tremors, seizures, syncope, facial asymmetry, speech difficulty, weakness, light-headedness, numbness and headaches.   Hematological: Negative.  Negative for adenopathy. Does not bruise/bleed easily.   Psychiatric/Behavioral: Negative.  Negative for agitation, behavioral problems, confusion, decreased concentration, dysphoric mood, hallucinations, self-injury, sleep disturbance and suicidal ideas. The patient is not nervous/anxious and is not hyperactive.      PHQ-2 Depression Screening  Little interest or pleasure in doing things? 0   Feeling down, depressed, or hopeless? 0   PHQ-2 Total Score 0       Objective   Physical Exam  Vitals signs and nursing note reviewed.   Constitutional:       General: She is not in acute distress.     Appearance: She is well-developed. She is not diaphoretic.   HENT:      Head: Normocephalic and atraumatic.      Right Ear: Tympanic membrane, ear canal and external ear normal.      Left Ear: Tympanic membrane, ear canal and external ear normal.      Nose: Nose normal.      Mouth/Throat:      Mouth: Mucous membranes  are not pale, not dry and not cyanotic.      Pharynx: Uvula midline. No oropharyngeal exudate or posterior oropharyngeal erythema.   Eyes:      General: Lids are normal. No scleral icterus.        Right eye: No foreign body, discharge or hordeolum.         Left eye: No foreign body, discharge or hordeolum.      Extraocular Movements:      Right eye: Normal extraocular motion and no nystagmus.      Left eye: Normal extraocular motion and no nystagmus.      Conjunctiva/sclera: Conjunctivae normal.      Right eye: Right conjunctiva is not injected. No chemosis, exudate or hemorrhage.     Left eye: Left conjunctiva is not injected. No chemosis, exudate or hemorrhage.     Pupils: Pupils are equal, round, and reactive to light.   Neck:      Musculoskeletal: Normal range of motion and neck supple.      Thyroid: No thyroid mass or thyromegaly.      Vascular: No carotid bruit.      Trachea: Trachea normal. No tracheal deviation.   Cardiovascular:      Rate and Rhythm: Normal rate and regular rhythm.      Pulses:           Dorsalis pedis pulses are 2+ on the right side and 2+ on the left side.        Posterior tibial pulses are 2+ on the right side and 2+ on the left side.      Heart sounds: Normal heart sounds. No murmur. No friction rub. No gallop.    Pulmonary:      Effort: Pulmonary effort is normal. No respiratory distress.      Breath sounds: Normal breath sounds. No decreased breath sounds, wheezing, rhonchi or rales.   Chest:      Chest wall: No tenderness. There is no dullness to percussion.      Breasts: Breasts are symmetrical.         Right: Normal. No swelling, bleeding, inverted nipple, mass, nipple discharge, skin change or tenderness.         Left: Normal. No swelling, bleeding, inverted nipple, mass, nipple discharge, skin change or tenderness.   Abdominal:      General: Bowel sounds are normal. There is no distension.      Palpations: Abdomen is soft. There is no hepatomegaly, splenomegaly or mass.       Tenderness: There is no abdominal tenderness. There is no guarding or rebound.      Hernia: No hernia is present.   Musculoskeletal: Normal range of motion.         General: No tenderness or deformity.   Lymphadenopathy:      Head:      Right side of head: No submandibular adenopathy.      Left side of head: No submandibular adenopathy.      Cervical: No cervical adenopathy.      Right cervical: No superficial, deep or posterior cervical adenopathy.     Left cervical: No superficial, deep or posterior cervical adenopathy.      Upper Body:      Right upper body: No supraclavicular, axillary or pectoral adenopathy.      Left upper body: No supraclavicular, axillary or pectoral adenopathy.   Skin:     General: Skin is warm and dry.      Findings: No rash.      Nails: There is no clubbing.            Neurological:      Mental Status: She is alert and oriented to person, place, and time.      Cranial Nerves: No cranial nerve deficit.      Sensory: No sensory deficit.      Coordination: Coordination normal.      Deep Tendon Reflexes: Reflexes are normal and symmetric.      Reflex Scores:       Bicep reflexes are 2+ on the right side and 2+ on the left side.       Brachioradialis reflexes are 2+ on the right side and 2+ on the left side.       Patellar reflexes are 2+ on the right side and 2+ on the left side.  Psychiatric:         Speech: Speech normal.         Behavior: Behavior normal.         Thought Content: Thought content normal.         Judgment: Judgment normal.         Assessment/Plan   Diagnoses and all orders for this visit:    1. Encounter for physical examination (Primary)  -     POCT urinalysis dipstick, automated  -     Comprehensive Metabolic Panel; Future  -     Lipid Panel; Future  -     TSH; Future  -     CBC & Differential; Future    2. Gastroesophageal reflux disease without esophagitis  -     omeprazole (priLOSEC) 20 MG capsule; Take 1 capsule by mouth Daily.  Dispense: 30 capsule; Refill: 5    3.  Primary insomnia  -     zolpidem (AMBIEN) 10 MG tablet; Take 1 tablet by mouth At Night As Needed for Sleep. for sleep  Dispense: 30 tablet; Refill: 2    4. Acquired hypothyroidism  -     levothyroxine (SYNTHROID, LEVOTHROID) 150 MCG tablet; Take 1 tablet by mouth Daily.  Dispense: 30 tablet; Refill: 5  -     TSH; Future  -     T4, Free; Future    5. Pure hypercholesterolemia  -     pravastatin (PRAVACHOL) 20 MG tablet; Take 1 tablet by mouth every night at bedtime.  Dispense: 30 tablet; Refill: 5  -     Comprehensive Metabolic Panel; Future  -     Lipid Panel; Future    6. Screening for osteoporosis  -     DEXA Bone Density Axial; Future    7. Abnormal glucose  -     Hemoglobin A1c; Future    8. Encounter for long-term current use of medication  -     Urine Drug Screen - Urine, Clean Catch  -     Comprehensive Metabolic Panel; Future  -     TSH; Future  -     CBC & Differential; Future    Other orders  -     naproxen (NAPROSYN) 500 MG tablet; TAKE 1 TABLET BY MOUTH EVERY EVENING AS NEEDED FOR MILD PAIN  Dispense: 30 tablet; Refill: 5        Please follow a low animal fat diet that is also low in sugar, low in junk food, low in sweet drinks and low in alcohol.  Please increase the amount of fiber in your diet as well as increasing your daily exercise, such as walking.    Discussed Shingrix vaccine and tdap with pt,  that are currently available at the pharmacy.     The patient has read and signed the [facility] Controlled Substance Contract.  I will continue to see patient for regular follow up appointments.  They are well controlled on their medication.  TOBIAS is updated every 3 months. The patient is aware of the potential for addiction and dependence.    Tobias report reviewed and is consistent   Handout on DASH diet.         Current Outpatient Medications:   •  cetirizine (ZyrTEC) 10 MG tablet, Take 10 mg by mouth daily., Disp: , Rfl:   •  fluticasone (FLONASE) 50 MCG/ACT nasal spray, 2 sprays into each  nostril Daily. Administer 2 sprays in each nostril for each dose., Disp: 16 g, Rfl: 5  •  glucosamine-chondroitin 500-400 MG capsule capsule, Take 2 capsules by mouth Daily., Disp: , Rfl:   •  levothyroxine (SYNTHROID, LEVOTHROID) 150 MCG tablet, Take 1 tablet by mouth Daily., Disp: 30 tablet, Rfl: 5  •  naproxen (NAPROSYN) 500 MG tablet, TAKE 1 TABLET BY MOUTH EVERY EVENING AS NEEDED FOR MILD PAIN, Disp: 30 tablet, Rfl: 5  •  omeprazole (priLOSEC) 20 MG capsule, Take 1 capsule by mouth Daily., Disp: 30 capsule, Rfl: 5  •  pravastatin (PRAVACHOL) 20 MG tablet, Take 1 tablet by mouth every night at bedtime., Disp: 30 tablet, Rfl: 5  •  zolpidem (AMBIEN) 10 MG tablet, Take 1 tablet by mouth At Night As Needed for Sleep. for sleep, Disp: 30 tablet, Rfl: 2    Return in about 3 months (around 4/20/2021), or if symptoms worsen or fail to improve, for Recheck controls.     Recent Results (from the past 168 hour(s))   POCT urinalysis dipstick, automated    Collection Time: 01/20/21  9:19 AM    Specimen: Urine   Result Value Ref Range    Color Yellow Yellow, Straw, Dark Yellow, Dipika    Clarity, UA Clear Clear    Specific Gravity  1.015 1.005 - 1.030    pH, Urine 6.5 5.0 - 8.0    Leukocytes Negative Negative    Nitrite, UA Negative Negative    Protein, POC Negative Negative mg/dL    Glucose, UA Negative Negative, 1000 mg/dL (3+) mg/dL    Ketones, UA Negative Negative    Urobilinogen, UA Normal Normal    Bilirubin Negative Negative    Blood, UA Negative Negative

## 2021-01-20 NOTE — PATIENT INSTRUCTIONS
"DASH Eating Plan  DASH stands for \"Dietary Approaches to Stop Hypertension.\" The DASH eating plan is a healthy eating plan that has been shown to reduce high blood pressure (hypertension). It may also reduce your risk for type 2 diabetes, heart disease, and stroke. The DASH eating plan may also help with weight loss.  What are tips for following this plan?    General guidelines  · Avoid eating more than 2,300 mg (milligrams) of salt (sodium) a day. If you have hypertension, you may need to reduce your sodium intake to 1,500 mg a day.  · Limit alcohol intake to no more than 1 drink a day for nonpregnant women and 2 drinks a day for men. One drink equals 12 oz of beer, 5 oz of wine, or 1½ oz of hard liquor.  · Work with your health care provider to maintain a healthy body weight or to lose weight. Ask what an ideal weight is for you.  · Get at least 30 minutes of exercise that causes your heart to beat faster (aerobic exercise) most days of the week. Activities may include walking, swimming, or biking.  · Work with your health care provider or diet and nutrition specialist (dietitian) to adjust your eating plan to your individual calorie needs.  Reading food labels    · Check food labels for the amount of sodium per serving. Choose foods with less than 5 percent of the Daily Value of sodium. Generally, foods with less than 300 mg of sodium per serving fit into this eating plan.  · To find whole grains, look for the word \"whole\" as the first word in the ingredient list.  Shopping  · Buy products labeled as \"low-sodium\" or \"no salt added.\"  · Buy fresh foods. Avoid canned foods and premade or frozen meals.  Cooking  · Avoid adding salt when cooking. Use salt-free seasonings or herbs instead of table salt or sea salt. Check with your health care provider or pharmacist before using salt substitutes.  · Do not luther foods. Cook foods using healthy methods such as baking, boiling, grilling, and broiling instead.  · Cook with " heart-healthy oils, such as olive, canola, soybean, or sunflower oil.  Meal planning  · Eat a balanced diet that includes:  ? 5 or more servings of fruits and vegetables each day. At each meal, try to fill half of your plate with fruits and vegetables.  ? Up to 6-8 servings of whole grains each day.  ? Less than 6 oz of lean meat, poultry, or fish each day. A 3-oz serving of meat is about the same size as a deck of cards. One egg equals 1 oz.  ? 2 servings of low-fat dairy each day.  ? A serving of nuts, seeds, or beans 5 times each week.  ? Heart-healthy fats. Healthy fats called Omega-3 fatty acids are found in foods such as flaxseeds and coldwater fish, like sardines, salmon, and mackerel.  · Limit how much you eat of the following:  ? Canned or prepackaged foods.  ? Food that is high in trans fat, such as fried foods.  ? Food that is high in saturated fat, such as fatty meat.  ? Sweets, desserts, sugary drinks, and other foods with added sugar.  ? Full-fat dairy products.  · Do not salt foods before eating.  · Try to eat at least 2 vegetarian meals each week.  · Eat more home-cooked food and less restaurant, buffet, and fast food.  · When eating at a restaurant, ask that your food be prepared with less salt or no salt, if possible.  What foods are recommended?  The items listed may not be a complete list. Talk with your dietitian about what dietary choices are best for you.  Grains  Whole-grain or whole-wheat bread. Whole-grain or whole-wheat pasta. Brown rice. Oatmeal. Quinoa. Bulgur. Whole-grain and low-sodium cereals. Neyda bread. Low-fat, low-sodium crackers. Whole-wheat flour tortillas.  Vegetables  Fresh or frozen vegetables (raw, steamed, roasted, or grilled). Low-sodium or reduced-sodium tomato and vegetable juice. Low-sodium or reduced-sodium tomato sauce and tomato paste. Low-sodium or reduced-sodium canned vegetables.  Fruits  All fresh, dried, or frozen fruit. Canned fruit in natural juice (without  added sugar).  Meat and other protein foods  Skinless chicken or turkey. Ground chicken or turkey. Pork with fat trimmed off. Fish and seafood. Egg whites. Dried beans, peas, or lentils. Unsalted nuts, nut butters, and seeds. Unsalted canned beans. Lean cuts of beef with fat trimmed off. Low-sodium, lean deli meat.  Dairy  Low-fat (1%) or fat-free (skim) milk. Fat-free, low-fat, or reduced-fat cheeses. Nonfat, low-sodium ricotta or cottage cheese. Low-fat or nonfat yogurt. Low-fat, low-sodium cheese.  Fats and oils  Soft margarine without trans fats. Vegetable oil. Low-fat, reduced-fat, or light mayonnaise and salad dressings (reduced-sodium). Canola, safflower, olive, soybean, and sunflower oils. Avocado.  Seasoning and other foods  Herbs. Spices. Seasoning mixes without salt. Unsalted popcorn and pretzels. Fat-free sweets.  What foods are not recommended?  The items listed may not be a complete list. Talk with your dietitian about what dietary choices are best for you.  Grains  Baked goods made with fat, such as croissants, muffins, or some breads. Dry pasta or rice meal packs.  Vegetables  Creamed or fried vegetables. Vegetables in a cheese sauce. Regular canned vegetables (not low-sodium or reduced-sodium). Regular canned tomato sauce and paste (not low-sodium or reduced-sodium). Regular tomato and vegetable juice (not low-sodium or reduced-sodium). Pickles. Olives.  Fruits  Canned fruit in a light or heavy syrup. Fried fruit. Fruit in cream or butter sauce.  Meat and other protein foods  Fatty cuts of meat. Ribs. Fried meat. Martins. Sausage. Bologna and other processed lunch meats. Salami. Fatback. Hotdogs. Bratwurst. Salted nuts and seeds. Canned beans with added salt. Canned or smoked fish. Whole eggs or egg yolks. Chicken or turkey with skin.  Dairy  Whole or 2% milk, cream, and half-and-half. Whole or full-fat cream cheese. Whole-fat or sweetened yogurt. Full-fat cheese. Nondairy creamers. Whipped toppings.  Processed cheese and cheese spreads.  Fats and oils  Butter. Stick margarine. Lard. Shortening. Ghee. Martins fat. Tropical oils, such as coconut, palm kernel, or palm oil.  Seasoning and other foods  Salted popcorn and pretzels. Onion salt, garlic salt, seasoned salt, table salt, and sea salt. Worcestershire sauce. Tartar sauce. Barbecue sauce. Teriyaki sauce. Soy sauce, including reduced-sodium. Steak sauce. Canned and packaged gravies. Fish sauce. Oyster sauce. Cocktail sauce. Horseradish that you find on the shelf. Ketchup. Mustard. Meat flavorings and tenderizers. Bouillon cubes. Hot sauce and Tabasco sauce. Premade or packaged marinades. Premade or packaged taco seasonings. Relishes. Regular salad dressings.  Where to find more information:  · National Heart, Lung, and Blood [place]: www.nhlbi.nih.gov  · American Heart Association: www.heart.org  Summary  · The DASH eating plan is a healthy eating plan that has been shown to reduce high blood pressure (hypertension). It may also reduce your risk for type 2 diabetes, heart disease, and stroke.  · With the DASH eating plan, you should limit salt (sodium) intake to 2,300 mg a day. If you have hypertension, you may need to reduce your sodium intake to 1,500 mg a day.  · When on the DASH eating plan, aim to eat more fresh fruits and vegetables, whole grains, lean proteins, low-fat dairy, and heart-healthy fats.  · Work with your health care provider or diet and nutrition specialist (dietitian) to adjust your eating plan to your individual calorie needs.  This information is not intended to replace advice given to you by your health care provider. Make sure you discuss any questions you have with your health care provider.  Document Revised: 11/30/2018 Document Reviewed: 12/11/2017  Elsevier Patient Education © 2020 [employer].

## 2021-04-19 DIAGNOSIS — F51.01 PRIMARY INSOMNIA: ICD-10-CM

## 2021-04-19 RX ORDER — ZOLPIDEM TARTRATE 10 MG/1
10 TABLET ORAL NIGHTLY PRN
Qty: 30 TABLET | Refills: 2 | Status: CANCELLED | OUTPATIENT
Start: 2021-04-19

## 2021-04-19 NOTE — TELEPHONE ENCOUNTER
Caller: Ana Lincoln    Relationship: Self    Best call back number:[phone]    Medication needed:   Requested Prescriptions     Pending Prescriptions Disp Refills   • zolpidem (AMBIEN) 10 MG tablet 30 tablet 2     Sig: Take 1 tablet by mouth At Night As Needed for Sleep. for sleep       When do you need the refill by: 04/19/2021    What additional details did the patient provide when requesting the medication: PATIENT STATES THAT SHE NEEDS TO SEE DR. BALL IN ORDER TO REFILL MEDICATION, SHE HAS ABOUT ONE OR TWO DAYS LEFT. PATIENT HAS A WORK CONFLICT AND IS NOT SURE WHETHER SHE WILL BE ABLE TO COME INTO THE APPOINTMENT TOMORROW. PATIENT IS REQUESTING A CALLBACK TO SEE IF MEDICATION CAN BE REFILLED WITHOUT APPOINTMENT.    Does the patient have less than a 3 day supply:  [x] Yes  [] No    What is the patient's preferred pharmacy: [facility] [address] [facility] [phone] [facility] [phone] FX

## 2021-04-20 ENCOUNTER — TELEMEDICINE (OUTPATIENT)
Dept: INTERNAL MEDICINE | Facility: CLINIC | Age: 61
End: 2021-04-20

## 2021-04-20 DIAGNOSIS — F51.01 PRIMARY INSOMNIA: Primary | Chronic | ICD-10-CM

## 2021-04-20 PROCEDURE — 99213 OFFICE O/P EST LOW 20 MIN: CPT | Performed by: FAMILY MEDICINE

## 2021-04-20 RX ORDER — ZOLPIDEM TARTRATE 10 MG/1
10 TABLET ORAL NIGHTLY PRN
Qty: 30 TABLET | Refills: 1 | Status: SHIPPED | OUTPATIENT
Start: 2021-04-20

## 2021-04-20 NOTE — PATIENT INSTRUCTIONS
Insomnia  Insomnia is a sleep disorder that makes it difficult to fall asleep or stay asleep. Insomnia can cause fatigue, low energy, difficulty concentrating, mood swings, and poor performance at work or school.  There are three different ways to classify insomnia:  · Difficulty falling asleep.  · Difficulty staying asleep.  · Waking up too early in the morning.  Any type of insomnia can be long-term (chronic) or short-term (acute). Both are common. Short-term insomnia usually lasts for three months or less. Chronic insomnia occurs at least three times a week for longer than three months.  What are the causes?  Insomnia may be caused by another condition, situation, or substance, such as:  · Anxiety.  · Certain medicines.  · Gastroesophageal reflux disease (GERD) or other gastrointestinal conditions.  · Asthma or other breathing conditions.  · Restless legs syndrome, sleep apnea, or other sleep disorders.  · Chronic pain.  · Menopause.  · Stroke.  · Abuse of alcohol, tobacco, or illegal drugs.  · Mental health conditions, such as depression.  · Caffeine.  · Neurological disorders, such as Alzheimer's disease.  · An overactive thyroid (hyperthyroidism).  Sometimes, the cause of insomnia may not be known.  What increases the risk?  Risk factors for insomnia include:  · Gender. Women are affected more often than men.  · Age. Insomnia is more common as you get older.  · Stress.  · Lack of exercise.  · Irregular work schedule or working night shifts.  · Traveling between different time zones.  · Certain medical and mental health conditions.  What are the signs or symptoms?  If you have insomnia, the main symptom is having trouble falling asleep or having trouble staying asleep. This may lead to other symptoms, such as:  · Feeling fatigued or having low energy.  · Feeling nervous about going to sleep.  · Not feeling rested in the morning.  · Having trouble concentrating.  · Feeling irritable, anxious, or depressed.  How  is this diagnosed?  This condition may be diagnosed based on:  · Your symptoms and medical history. Your health care provider may ask about:  ? Your sleep habits.  ? Any medical conditions you have.  ? Your mental health.  · A physical exam.  How is this treated?  Treatment for insomnia depends on the cause. Treatment may focus on treating an underlying condition that is causing insomnia. Treatment may also include:  · Medicines to help you sleep.  · Counseling or therapy.  · Lifestyle adjustments to help you sleep better.  Follow these instructions at home:  Eating and drinking    · Limit or avoid alcohol, caffeinated beverages, and cigarettes, especially close to bedtime. These can disrupt your sleep.  · Do not eat a large meal or eat spicy foods right before bedtime. This can lead to digestive discomfort that can make it hard for you to sleep.  Sleep habits    · Keep a sleep diary to help you and your health care provider figure out what could be causing your insomnia. Write down:  ? When you sleep.  ? When you wake up during the night.  ? How well you sleep.  ? How rested you feel the next day.  ? Any side effects of medicines you are taking.  ? What you eat and drink.  · Make your bedroom a dark, comfortable place where it is easy to fall asleep.  ? Put up shades or blackout curtains to block light from outside.  ? Use a white noise machine to block noise.  ? Keep the temperature cool.  · Limit screen use before bedtime. This includes:  ? Watching TV.  ? Using your smartphone, tablet, or computer.  · Stick to a routine that includes going to bed and waking up at the same times every day and night. This can help you fall asleep faster. Consider making a quiet activity, such as reading, part of your nighttime routine.  · Try to avoid taking naps during the day so that you sleep better at night.  · Get out of bed if you are still awake after 15 minutes of trying to sleep. Keep the lights down, but try reading or  doing a quiet activity. When you feel sleepy, go back to bed.  General instructions  · Take over-the-counter and prescription medicines only as told by your health care provider.  · Exercise regularly, as told by your health care provider. Avoid exercise starting several hours before bedtime.  · Use relaxation techniques to manage stress. Ask your health care provider to suggest some techniques that may work well for you. These may include:  ? Breathing exercises.  ? Routines to release muscle tension.  ? Visualizing peaceful scenes.  · Make sure that you drive carefully. Avoid driving if you feel very sleepy.  · Keep all follow-up visits as told by your health care provider. This is important.  Contact a health care provider if:  · You are tired throughout the day.  · You have trouble in your daily routine due to sleepiness.  · You continue to have sleep problems, or your sleep problems get worse.  Get help right away if:  · You have serious thoughts about hurting yourself or someone else.  If you ever feel like you may hurt yourself or others, or have thoughts about taking your own life, get help right away. You can go to your nearest emergency department or call:  · Your local emergency services (911 in the [place]  · A suicide crisis helpline, such as the National Suicide Prevention Lifeline at 1-308.950.3517. This is open 24 hours a day.  Summary  · Insomnia is a sleep disorder that makes it difficult to fall asleep or stay asleep.  · Insomnia can be long-term (chronic) or short-term (acute).  · Treatment for insomnia depends on the cause. Treatment may focus on treating an underlying condition that is causing insomnia.  · Keep a sleep diary to help you and your health care provider figure out what could be causing your insomnia.  This information is not intended to replace advice given to you by your health care provider. Make sure you discuss any questions you have with your health care provider.  Document  Revised: 11/30/2018 Document Reviewed: 09/27/2018  Elsevier Patient Education © 2021 Elsevier Inc.

## 2021-04-20 NOTE — PROGRESS NOTES
Subjective   Ana Lincoln is a 60 y.o. female.     Chief Complaint   Patient presents with   • Insomnia   This was an audio and video enabled telemedicine encounter.    You have chosen to receive care through a telehealth visit.  Do you consent to use a video/audio connection for your medical care today? Yes      There were no vitals taken for this visit.      Insomnia  This is a chronic problem. The current episode started more than 1 year ago. The problem occurs constantly. The problem has been unchanged. Pertinent negatives include no abdominal pain, anorexia, arthralgias, change in bowel habit, chest pain, chills, congestion, coughing, diaphoresis, fatigue, fever, headaches, joint swelling, myalgias, nausea, neck pain, numbness, rash, sore throat, swollen glands, urinary symptoms, vertigo, visual change, vomiting or weakness. Nothing aggravates the symptoms. Treatments tried: ambien. The treatment provided significant relief.      Pt is under stress at work and has problems sleeping because of the stress.   The following portions of the patient's history were reviewed and updated as appropriate: allergies, current medications, past family history, past medical history, past social history, past surgical history and problem list.    Past Medical History:   Diagnosis Date   • Anxiety    • Calculus of kidney    • Carbuncle and furuncle of buttock 08/16/2012   • Chalazion    • GERD (gastroesophageal reflux disease)    • Goiter    • H/O bone density study 03/28/2018    nl   • Hyperlipidemia    • Hypothyroid    • Insomnia    • Persistent disorder of initiating or maintaining sleep    • Routine general medical examination at a health care facility 02/19/2013   • Routine general medical examination at a health care facility 02/16/2012   • Sacroiliac pain    • Sicca syndrome (CMS/HCC)    • Sleep apnea    • Visit for routine gyn exam 02/16/2012      Past Surgical History:   Procedure Laterality Date   •  REPLACEMENT TOTAL KNEE BILATERAL     • TOTAL ABDOMINAL HYSTERECTOMY WITH SALPINGO OOPHORECTOMY Bilateral 2002      Family History   Problem Relation Age of Onset   • Thyroid disease Mother    • Memory loss Mother    • Alzheimer's disease Mother    • Other Mother         BCCA; Blood Clot   • Heart disease Father    • Atrial fibrillation Father    • Cirrhosis Father    • Depression Father    • Other Father         SCCA   • Alzheimer's disease Maternal Aunt    • Breast cancer Maternal Grandmother 80   • Atrial fibrillation Sister    • Ovarian cancer Neg Hx    • BRCA 1/2 Neg Hx       Social History     Socioeconomic History   • Marital status:      Spouse name: Not on file   • Number of children: Not on file   • Years of education: Not on file   • Highest education level: Not on file   Tobacco Use   • Smoking status: Former Smoker   • Smokeless tobacco: Never Used   • Tobacco comment: quit 30 + years   Substance and Sexual Activity   • Alcohol use: Yes     Alcohol/week: 5.0 standard drinks     Types: 5 Glasses of wine per week     Comment: red wine   • Drug use: No   • Sexual activity: Yes     Partners: Male     Birth control/protection: Post-menopausal, Surgical      Allergies   Allergen Reactions   • Morphine And Related Swelling     Tongue swells, doesn't help pain   • Erythromycin Nausea Only   • Requip [Ropinirole Hcl] Nausea Only       Review of Systems   Constitutional: Negative for chills, diaphoresis, fatigue and fever.   HENT: Negative for congestion and sore throat.    Respiratory: Negative for cough.    Cardiovascular: Negative for chest pain.   Gastrointestinal: Negative for abdominal pain, anorexia, change in bowel habit, nausea and vomiting.   Musculoskeletal: Negative for arthralgias, joint swelling, myalgias and neck pain.   Skin: Negative for rash.   Neurological: Negative for vertigo, weakness, numbness and headaches.   Psychiatric/Behavioral: The patient has insomnia.        Objective    Physical Exam  HENT:      Head: Normocephalic.      Nose: Nose normal.   Eyes:      Extraocular Movements: Extraocular movements intact.   Pulmonary:      Effort: No respiratory distress.   Musculoskeletal:      Cervical back: Normal range of motion.   Neurological:      Mental Status: She is alert and oriented to person, place, and time.   Psychiatric:         Mood and Affect: Mood normal.         Behavior: Behavior normal.         Thought Content: Thought content normal.         Judgment: Judgment normal.         Assessment/Plan   Diagnoses and all orders for this visit:    1. Primary insomnia (Primary)  -     zolpidem (AMBIEN) 10 MG tablet; Take 1 tablet by mouth At Night As Needed for Sleep. for sleep  Dispense: 30 tablet; Refill: 1    handout on insomnia    Pt advised that I will be discontinuing writing chronic controlled medication.            Current Outpatient Medications:   •  cetirizine (ZyrTEC) 10 MG tablet, Take 10 mg by mouth daily., Disp: , Rfl:   •  fluticasone (FLONASE) 50 MCG/ACT nasal spray, 2 sprays into each nostril Daily. Administer 2 sprays in each nostril for each dose., Disp: 16 g, Rfl: 5  •  glucosamine-chondroitin 500-400 MG capsule capsule, Take 2 capsules by mouth Daily., Disp: , Rfl:   •  levothyroxine (SYNTHROID, LEVOTHROID) 150 MCG tablet, Take 1 tablet by mouth Daily., Disp: 30 tablet, Rfl: 5  •  naproxen (NAPROSYN) 500 MG tablet, TAKE 1 TABLET BY MOUTH EVERY EVENING AS NEEDED FOR MILD PAIN, Disp: 30 tablet, Rfl: 5  •  omeprazole (priLOSEC) 20 MG capsule, Take 1 capsule by mouth Daily., Disp: 30 capsule, Rfl: 5  •  pravastatin (PRAVACHOL) 20 MG tablet, Take 1 tablet by mouth every night at bedtime., Disp: 30 tablet, Rfl: 5  •  zolpidem (AMBIEN) 10 MG tablet, Take 1 tablet by mouth At Night As Needed for Sleep. for sleep, Disp: 30 tablet, Rfl: 1    Return in about 3 months (around 7/20/2021), or if symptoms worsen or fail to improve, for Recheck.

## 2021-06-04 ENCOUNTER — TELEPHONE (OUTPATIENT)
Dept: INTERNAL MEDICINE | Facility: CLINIC | Age: 61
End: 2021-06-04

## 2021-06-04 NOTE — TELEPHONE ENCOUNTER
Received request to release all records.  Patient not happy with current provider and is establishing with [facility]

## 2021-07-16 DIAGNOSIS — K21.9 GASTROESOPHAGEAL REFLUX DISEASE WITHOUT ESOPHAGITIS: ICD-10-CM

## 2021-07-16 DIAGNOSIS — E03.9 ACQUIRED HYPOTHYROIDISM: ICD-10-CM

## 2021-07-16 RX ORDER — LEVOTHYROXINE SODIUM 0.15 MG/1
TABLET ORAL
Qty: 30 TABLET | Refills: 4 | Status: SHIPPED | OUTPATIENT
Start: 2021-07-16 | End: 2021-12-03

## 2021-07-16 RX ORDER — OMEPRAZOLE 20 MG/1
CAPSULE, DELAYED RELEASE ORAL
Qty: 30 CAPSULE | Refills: 4 | Status: SHIPPED | OUTPATIENT
Start: 2021-07-16 | End: 2021-12-03

## 2021-07-16 NOTE — TELEPHONE ENCOUNTER
Refill request levothyroxine 150mcg   Last refill 1/20/21    Omeprazole 20mg  Last refill 1/20/21  Last visit 1/20/21

## 2021-09-07 DIAGNOSIS — E78.00 PURE HYPERCHOLESTEROLEMIA: ICD-10-CM

## 2021-09-07 RX ORDER — PRAVASTATIN SODIUM 20 MG
TABLET ORAL
Qty: 26 TABLET | OUTPATIENT
Start: 2021-09-07

## 2021-09-07 NOTE — TELEPHONE ENCOUNTER
I CALLED PT AND LEFT VM PER KELSEY     NEEDS APPT FOR REFILLS   PRAVASTATIN     LAST REFILL 1/20/21  LAST VISIT 1/20/21

## 2021-09-28 DIAGNOSIS — E78.00 PURE HYPERCHOLESTEROLEMIA: ICD-10-CM

## 2021-09-28 RX ORDER — PRAVASTATIN SODIUM 20 MG
TABLET ORAL
Qty: 26 TABLET | OUTPATIENT
Start: 2021-09-28

## 2021-09-28 NOTE — TELEPHONE ENCOUNTER
Last seen 4/20/21. Last filled 1/20/21 with 5 refills.  No scheduled appointment.  Med denied-note says PN on 9/7 that she needs an appointment

## 2021-11-27 DIAGNOSIS — E03.9 ACQUIRED HYPOTHYROIDISM: ICD-10-CM

## 2021-11-27 DIAGNOSIS — K21.9 GASTROESOPHAGEAL REFLUX DISEASE WITHOUT ESOPHAGITIS: ICD-10-CM

## 2021-12-03 RX ORDER — LEVOTHYROXINE SODIUM 0.15 MG/1
TABLET ORAL
Qty: 30 TABLET | Refills: 4 | Status: SHIPPED | OUTPATIENT
Start: 2021-12-03

## 2021-12-03 RX ORDER — OMEPRAZOLE 20 MG/1
CAPSULE, DELAYED RELEASE ORAL
Qty: 30 CAPSULE | Refills: 4 | Status: SHIPPED | OUTPATIENT
Start: 2021-12-03 | End: 2022-10-24

## 2021-12-03 NOTE — TELEPHONE ENCOUNTER
Rx Refill Note  Requested Prescriptions     Pending Prescriptions Disp Refills   • omeprazole (priLOSEC) 20 MG capsule [Pharmacy Med Name: OMEPRAZOLE DR 20 MG CAPSULE] 30 capsule 4     Sig: TAKE ONE CAPSULE BY MOUTH DAILY   • levothyroxine (SYNTHROID, LEVOTHROID) 150 MCG tablet [Pharmacy Med Name: LEVOTHYROXINE 150 MCG TABLET] 30 tablet 4     Sig: TAKE ONE TABLET BY MOUTH DAILY      Last office visit with prescribing clinician: 1/20/2021      Next office visit with prescribing clinician: Visit date not found        1/20/2021    Josué Curry MA  12/03/21, 09:22 EST

## 2022-01-26 ENCOUNTER — TRANSCRIBE ORDERS (OUTPATIENT)
Dept: ADMINISTRATIVE | Facility: HOSPITAL | Age: 62
End: 2022-01-26

## 2022-01-26 DIAGNOSIS — Z12.31 VISIT FOR SCREENING MAMMOGRAM: Primary | ICD-10-CM

## 2022-02-25 ENCOUNTER — APPOINTMENT (OUTPATIENT)
Dept: MAMMOGRAPHY | Facility: HOSPITAL | Age: 62
End: 2022-02-25

## 2022-03-16 ENCOUNTER — HOSPITAL ENCOUNTER (OUTPATIENT)
Dept: MAMMOGRAPHY | Facility: HOSPITAL | Age: 62
Discharge: HOME | End: 2022-03-16
Admitting: INTERNAL MEDICINE

## 2022-03-16 DIAGNOSIS — Z12.31 VISIT FOR SCREENING MAMMOGRAM: ICD-10-CM

## 2022-03-16 PROCEDURE — 77067 SCR MAMMO BI INCL CAD: CPT

## 2022-03-16 PROCEDURE — 77063 BREAST TOMOSYNTHESIS BI: CPT

## 2022-03-16 PROCEDURE — 77067 SCR MAMMO BI INCL CAD: CPT | Performed by: RADIOLOGY

## 2022-03-16 PROCEDURE — 77063 BREAST TOMOSYNTHESIS BI: CPT | Performed by: RADIOLOGY

## 2022-04-28 ENCOUNTER — HOSPITAL ENCOUNTER (OUTPATIENT)
Dept: ULTRASOUND IMAGING | Facility: HOSPITAL | Age: 62
Discharge: HOME | End: 2022-04-28

## 2022-04-28 ENCOUNTER — HOSPITAL ENCOUNTER (OUTPATIENT)
Dept: MAMMOGRAPHY | Facility: HOSPITAL | Age: 62
Discharge: HOME | End: 2022-04-28

## 2022-04-28 DIAGNOSIS — R92.8 ABNORMAL MAMMOGRAM: ICD-10-CM

## 2022-04-28 PROCEDURE — 77065 DX MAMMO INCL CAD UNI: CPT

## 2022-04-28 PROCEDURE — G0279 TOMOSYNTHESIS, MAMMO: HCPCS

## 2022-04-28 PROCEDURE — 77065 DX MAMMO INCL CAD UNI: CPT | Performed by: RADIOLOGY

## 2022-04-28 PROCEDURE — 77061 BREAST TOMOSYNTHESIS UNI: CPT | Performed by: RADIOLOGY

## 2022-04-28 PROCEDURE — 76642 ULTRASOUND BREAST LIMITED: CPT | Performed by: RADIOLOGY

## 2022-04-28 PROCEDURE — 76642 ULTRASOUND BREAST LIMITED: CPT

## 2022-04-29 ENCOUNTER — TRANSCRIBE ORDERS (OUTPATIENT)
Dept: MAMMOGRAPHY | Facility: HOSPITAL | Age: 62
End: 2022-04-29

## 2022-04-29 DIAGNOSIS — R92.8 ABNORMAL MAMMOGRAM: Primary | ICD-10-CM

## 2022-05-05 ENCOUNTER — TELEPHONE (OUTPATIENT)
Dept: MAMMOGRAPHY | Facility: HOSPITAL | Age: 62
End: 2022-05-05

## 2022-05-05 NOTE — TELEPHONE ENCOUNTER
Returned patient call; patient states she is scheduled for US biopsy next week & didn't ask questioins when she was here. Reviewed what she could expect during visit. Questions answered, support given. Encouraged to call back with further questions or concerns. Patient verbalized understanding.

## 2022-05-09 ENCOUNTER — HOSPITAL ENCOUNTER (OUTPATIENT)
Dept: ULTRASOUND IMAGING | Facility: HOSPITAL | Age: 62
Discharge: HOME | End: 2022-05-09

## 2022-05-09 ENCOUNTER — HOSPITAL ENCOUNTER (OUTPATIENT)
Dept: MAMMOGRAPHY | Facility: HOSPITAL | Age: 62
Discharge: HOME | End: 2022-05-09

## 2022-05-09 DIAGNOSIS — R92.8 ABNORMAL MAMMOGRAM: ICD-10-CM

## 2022-05-09 PROCEDURE — A4648 IMPLANTABLE TISSUE MARKER: HCPCS

## 2022-05-09 PROCEDURE — 0 LIDOCAINE 1 % SOLUTION: Performed by: RADIOLOGY

## 2022-05-09 PROCEDURE — 88305 TISSUE EXAM BY PATHOLOGIST: CPT | Performed by: INTERNAL MEDICINE

## 2022-05-09 PROCEDURE — 77065 DX MAMMO INCL CAD UNI: CPT | Performed by: RADIOLOGY

## 2022-05-09 PROCEDURE — 19083 BX BREAST 1ST LESION US IMAG: CPT | Performed by: RADIOLOGY

## 2022-05-09 RX ORDER — LIDOCAINE HYDROCHLORIDE AND EPINEPHRINE 10; 10 MG/ML; UG/ML
10 INJECTION, SOLUTION INFILTRATION; PERINEURAL ONCE
Status: COMPLETED | OUTPATIENT
Start: 2022-05-09 | End: 2022-05-09

## 2022-05-09 RX ORDER — LIDOCAINE HYDROCHLORIDE 10 MG/ML
5 INJECTION, SOLUTION INFILTRATION; PERINEURAL ONCE
Status: COMPLETED | OUTPATIENT
Start: 2022-05-09 | End: 2022-05-09

## 2022-05-09 RX ADMIN — Medication 3 ML: at 13:47

## 2022-05-09 RX ADMIN — LIDOCAINE HYDROCHLORIDE,EPINEPHRINE BITARTRATE 8 ML: 10; .01 INJECTION, SOLUTION INFILTRATION; PERINEURAL at 13:48

## 2022-05-10 ENCOUNTER — TRANSCRIBE ORDERS (OUTPATIENT)
Dept: MAMMOGRAPHY | Facility: HOSPITAL | Age: 62
End: 2022-05-10

## 2022-05-10 DIAGNOSIS — R92.8 ABNORMAL MAMMOGRAM: Primary | ICD-10-CM

## 2022-05-12 ENCOUNTER — TELEPHONE (OUTPATIENT)
Dept: MAMMOGRAPHY | Facility: HOSPITAL | Age: 62
End: 2022-05-12

## 2022-05-12 LAB
CYTO UR: NORMAL
LAB AP CASE REPORT: NORMAL
LAB AP CLINICAL INFORMATION: NORMAL
LAB AP DIAGNOSIS COMMENT: NORMAL
PATH REPORT.FINAL DX SPEC: NORMAL
PATH REPORT.GROSS SPEC: NORMAL

## 2022-05-12 NOTE — TELEPHONE ENCOUNTER
Patient notified of pathology results and recommendations. Verbalizes understanding. Denies discomfort. Denies signs and symptoms of infection.     Patient desires to research surgeon choice. Patient is to call back with decision; an appointment will then be scheduled and she will be notified. Verbalizes understanding.

## 2022-05-19 ENCOUNTER — TELEPHONE (OUTPATIENT)
Dept: MAMMOGRAPHY | Facility: HOSPITAL | Age: 62
End: 2022-05-19

## 2022-05-19 NOTE — TELEPHONE ENCOUNTER
Patient notified of surgical consult appointment with Dr. Peacock on 6.20.22 @ 9945. Patient given office contact & location information. Told to bring photo ID, list of prescription & OTC medications, insurance information, must wear a mask & come to visit alone unless assistance is needed. Encouraged patient to call back or contact surgeon's office with further questions. Patient verbalized understanding.

## 2022-05-19 NOTE — TELEPHONE ENCOUNTER
Patient returned call, requests Dr Peacock for surgical consult. She will be notified when an appointment is scheduled.

## 2022-06-21 ENCOUNTER — TRANSCRIBE ORDERS (OUTPATIENT)
Dept: MAMMOGRAPHY | Facility: HOSPITAL | Age: 62
End: 2022-06-21

## 2022-06-21 DIAGNOSIS — N63.24 UNSPECIFIED LUMP IN THE LEFT BREAST, LOWER INNER QUADRANT: Primary | ICD-10-CM

## 2022-06-22 ENCOUNTER — TRANSCRIBE ORDERS (OUTPATIENT)
Dept: ADMINISTRATIVE | Facility: HOSPITAL | Age: 62
End: 2022-06-22

## 2022-06-22 DIAGNOSIS — Z11.59 ENCOUNTER FOR SCREENING FOR VIRAL DISEASE: Primary | ICD-10-CM

## 2022-06-22 DIAGNOSIS — N63.24 UNSPECIFIED LUMP IN THE LEFT BREAST, LOWER INNER QUADRANT: ICD-10-CM

## 2022-08-04 ENCOUNTER — HOSPITAL ENCOUNTER (OUTPATIENT)
Dept: ULTRASOUND IMAGING | Facility: HOSPITAL | Age: 62
Discharge: HOME | End: 2022-08-04

## 2022-08-04 ENCOUNTER — HOSPITAL ENCOUNTER (OUTPATIENT)
Dept: MAMMOGRAPHY | Facility: HOSPITAL | Age: 62
Discharge: HOME | End: 2022-08-04

## 2022-08-04 DIAGNOSIS — N63.24 UNSPECIFIED LUMP IN THE LEFT BREAST, LOWER INNER QUADRANT: ICD-10-CM

## 2022-08-04 DIAGNOSIS — R92.8 ABNORMAL MAMMOGRAM: ICD-10-CM

## 2022-08-04 PROCEDURE — 77061 BREAST TOMOSYNTHESIS UNI: CPT | Performed by: RADIOLOGY

## 2022-08-04 PROCEDURE — G0279 TOMOSYNTHESIS, MAMMO: HCPCS

## 2022-08-04 PROCEDURE — 77065 DX MAMMO INCL CAD UNI: CPT

## 2022-08-04 PROCEDURE — 77065 DX MAMMO INCL CAD UNI: CPT | Performed by: RADIOLOGY

## 2022-08-08 ENCOUNTER — TRANSCRIBE ORDERS (OUTPATIENT)
Dept: ADMINISTRATIVE | Facility: HOSPITAL | Age: 62
End: 2022-08-08

## 2022-08-08 DIAGNOSIS — R92.8 ABNORMAL MAMMOGRAM: Primary | ICD-10-CM

## 2022-08-09 ENCOUNTER — APPOINTMENT (OUTPATIENT)
Dept: PREADMISSION TESTING | Facility: HOSPITAL | Age: 62
End: 2022-08-09

## 2022-08-12 ENCOUNTER — APPOINTMENT (OUTPATIENT)
Dept: MAMMOGRAPHY | Facility: HOSPITAL | Age: 62
End: 2022-08-12

## 2022-10-22 DIAGNOSIS — K21.9 GASTROESOPHAGEAL REFLUX DISEASE WITHOUT ESOPHAGITIS: ICD-10-CM

## 2022-10-24 RX ORDER — OMEPRAZOLE 20 MG/1
CAPSULE, DELAYED RELEASE ORAL
Qty: 30 CAPSULE | Refills: 0 | Status: SHIPPED | OUTPATIENT
Start: 2022-10-24 | End: 2022-11-22

## 2022-11-22 DIAGNOSIS — K21.9 GASTROESOPHAGEAL REFLUX DISEASE WITHOUT ESOPHAGITIS: ICD-10-CM

## 2022-11-22 RX ORDER — OMEPRAZOLE 20 MG/1
CAPSULE, DELAYED RELEASE ORAL
Qty: 14 CAPSULE | Refills: 0 | Status: SHIPPED | OUTPATIENT
Start: 2022-11-22

## 2022-12-22 DIAGNOSIS — K21.9 GASTROESOPHAGEAL REFLUX DISEASE WITHOUT ESOPHAGITIS: ICD-10-CM

## 2022-12-22 RX ORDER — OMEPRAZOLE 20 MG/1
CAPSULE, DELAYED RELEASE ORAL
Qty: 14 CAPSULE | Refills: 0 | OUTPATIENT
Start: 2022-12-22

## 2023-02-13 ENCOUNTER — TRANSCRIBE ORDERS (OUTPATIENT)
Dept: ADMINISTRATIVE | Facility: HOSPITAL | Age: 63
End: 2023-02-13
Payer: COMMERCIAL

## 2023-02-13 DIAGNOSIS — R92.8 ABNORMAL MAMMOGRAM: Primary | ICD-10-CM

## 2023-08-23 ENCOUNTER — HOSPITAL ENCOUNTER (OUTPATIENT)
Dept: MAMMOGRAPHY | Facility: HOSPITAL | Age: 63
Discharge: HOME | End: 2023-08-23
Admitting: SURGERY
Payer: COMMERCIAL

## 2023-08-23 DIAGNOSIS — R92.8 ABNORMAL MAMMOGRAM: ICD-10-CM

## 2023-08-23 PROCEDURE — 77066 DX MAMMO INCL CAD BI: CPT

## 2023-08-23 PROCEDURE — G0279 TOMOSYNTHESIS, MAMMO: HCPCS

## 2023-08-24 ENCOUNTER — TRANSCRIBE ORDERS (OUTPATIENT)
Dept: ADMINISTRATIVE | Facility: HOSPITAL | Age: 63
End: 2023-08-24
Payer: COMMERCIAL

## 2023-08-24 DIAGNOSIS — R92.8 ABNORMAL MAMMOGRAM: Primary | ICD-10-CM

## 2024-03-01 ENCOUNTER — TRANSCRIBE ORDERS (OUTPATIENT)
Dept: ADMINISTRATIVE | Facility: HOSPITAL | Age: 64
End: 2024-03-01
Payer: COMMERCIAL

## 2024-03-01 ENCOUNTER — HOSPITAL ENCOUNTER (OUTPATIENT)
Dept: MAMMOGRAPHY | Facility: HOSPITAL | Age: 64
Discharge: HOME | End: 2024-03-01
Admitting: SURGERY
Payer: COMMERCIAL

## 2024-03-01 DIAGNOSIS — Z12.31 SCREENING MAMMOGRAM FOR BREAST CANCER: Primary | ICD-10-CM

## 2024-03-01 DIAGNOSIS — R92.8 ABNORMAL MAMMOGRAM: ICD-10-CM

## 2024-03-01 PROCEDURE — 77065 DX MAMMO INCL CAD UNI: CPT

## 2024-08-15 LAB
NCCN CRITERIA FLAG: NORMAL
TYRER CUZICK SCORE: 11.9

## 2024-08-28 ENCOUNTER — HOSPITAL ENCOUNTER (OUTPATIENT)
Dept: MAMMOGRAPHY | Facility: HOSPITAL | Age: 64
Discharge: HOME | End: 2024-08-28
Admitting: INTERNAL MEDICINE
Payer: COMMERCIAL

## 2024-08-28 DIAGNOSIS — Z12.31 SCREENING MAMMOGRAM FOR BREAST CANCER: ICD-10-CM

## 2024-08-28 PROCEDURE — 77067 SCR MAMMO BI INCL CAD: CPT

## 2024-08-28 PROCEDURE — 77063 BREAST TOMOSYNTHESIS BI: CPT

## 2024-09-06 NOTE — PROGRESS NOTES
Chief Complaint  Sleeping Problem, Snoring, Dry Mouth, and Unable To Fall Asleep    Subjective     History of Present Illness:  Ana Lincoln is a 64 y.o. female with hyperlipidemia, hypothyroidism, anxiety, sicca syndrome, and GERD.  The patient is referred by Jeanette Best MD with primary care.  Patient has had difficulty with snoring. She was diagnosed with sleep apnea in the past. Review of self-reported questionnaire notes symptoms including the aforementioned snoring, witnessed episodes of apnea, heartburn/reflux, dry mouth, nasal allergies, grinding teeth, frequent nighttime urination, difficulty falling asleep, pain at night, night sweats, and decreased libido.  The patient reports symptoms have been ongoing for more than 5 years.  She typically goes to bed at 11-11:30 PM waking at 7:30 AM on weekdays and 730-8 AM on weekends.  She denies use of tobacco, drinks alcohol 4 or more times per week, and denies use of illicit or recreational drugs.  She drinks 2 to 3 glasses of regular tea daily, and 4 diet Cokes per week approximately.  The patient's significant other reports witnessed episodes of heavy snoring which occur nightly and in all sleeping positions. She has been using Ambien at age 42. She is prescribed 10 mg at this time. She has been waking due to pain. She is concerned that she will not be able to wear a mask.     Further details are as follows:    [place] Scale is (out of 24): Total score: 5     Estimated average amount of sleep per night: about 7-8 hours  Number of times she wakes up at night: 1 time per night  Difficulty falling back asleep: not usually  It usually takes minutes to go to sleep with Ambien.  She feels sleepy upon waking up: not usually if she is using ambien  Rotating or night shift work: no    Drowsiness/Sleepiness:  She exhibits the following:  excessive daytime fatigue    Snoring/Breathing:  She exhibits the following:  loud snoring, snores in all sleep positions,  quits breathing at night, and awakens with dry mouth    Head Injury:  She exhibits the following:  No not recently. Hit head about 30 years ago.    Reflux:  She describes the following:  takes medication for reflux    Narcolepsy:  She exhibits the following:  none    RLS/PLMs:  She describes the following:  none    Insomnia:  She describes the following:  bothered by pain at night    Parasomnia:  She exhibits the following:  excessive sweating while sleeping    Weight:       09/11/24  0822   Weight: 106 kg (234 lb 4.8 oz)      Weight change in the last year:  loss: 2 lbs    The patient's relevant past medical, surgical, family, and social history reviewed and updated in Epic as appropriate.    Review of Systems   Constitutional:  Positive for fatigue.   HENT:  Positive for congestion, dental problem, hearing loss, postnasal drip, sinus pressure and sneezing.    Eyes:  Positive for pain.   Respiratory:  Positive for apnea, choking and wheezing.    Cardiovascular:  Positive for palpitations and leg swelling.   Gastrointestinal:  Positive for nausea.   Endocrine: Positive for heat intolerance.   Genitourinary: Negative.    Musculoskeletal:  Positive for joint swelling and myalgias.   Skin: Negative.    Allergic/Immunologic: Positive for environmental allergies and immunocompromised state.   Neurological:  Positive for tremors and light-headedness.   Hematological:  Bruises/bleeds easily.   Psychiatric/Behavioral:  The patient is nervous/anxious.    All other systems reviewed and are negative.      PMH:    Past Medical History:   Diagnosis Date    Anxiety     Arthritis     Calculus of kidney     Carbuncle and furuncle of buttock 08/16/2012    Chalazion     GERD (gastroesophageal reflux disease)     Goiter     H/O bone density study 03/28/2018    nl    Hyperlipidemia     Hypothyroid     Insomnia     Persistent disorder of initiating or maintaining sleep     Routine general medical examination at a health care facility  2013    Routine general medical examination at a health care facility 2012    Sacroiliac pain     Sicca syndrome     Sleep apnea     Visit for routine gyn exam 2012     Past Surgical History:   Procedure Laterality Date    BREAST BIOPSY Left 2022    u/s    HERNIA REPAIR      REPLACEMENT TOTAL KNEE BILATERAL      TOTAL ABDOMINAL HYSTERECTOMY WITH SALPINGO OOPHORECTOMY Bilateral      OB History          3    Para   3    Term   3            AB        Living             SAB        IAB        Ectopic        Molar        Multiple        Live Births   3              Allergies   Allergen Reactions    Morphine And Codeine Swelling     Tongue swells, doesn't help pain    Erythromycin Nausea Only    Requip [Ropinirole Hcl] Nausea Only       MEDS:  Prior to Admission medications    Medication Sig Start Date End Date Taking? Authorizing Provider   cetirizine (ZyrTEC) 10 MG tablet Take 10 mg by mouth daily.    ProviderSerg MD   fluticasone (FLONASE) 50 MCG/ACT nasal spray 2 sprays into each nostril Daily. Administer 2 sprays in each nostril for each dose. 18   Bryanna Beckwith MD   glucosamine-chondroitin 500-400 MG capsule capsule Take 2 capsules by mouth Daily.    ProviderSerg MD   levothyroxine (SYNTHROID, LEVOTHROID) 150 MCG tablet TAKE ONE TABLET BY MOUTH DAILY 12/3/21   Bryanna Beckwith MD   naproxen (NAPROSYN) 500 MG tablet TAKE 1 TABLET BY MOUTH EVERY EVENING AS NEEDED FOR MILD PAIN 21   Bryanna Beckwith MD   omeprazole (priLOSEC) 20 MG capsule TAKE ONE CAPSULE BY MOUTH DAILY *NEED APPOINTMENT FOR FURTHER REFILLS* 22   Bryanna Beckwith MD   pravastatin (PRAVACHOL) 20 MG tablet Take 1 tablet by mouth every night at bedtime. 21   Bryanna Beckwith MD   zolpidem (AMBIEN) 10 MG tablet Take 1 tablet by mouth At Night As Needed for Sleep. for sleep 21   Bryanna Beckwith MD       FH:  Family History   Problem Relation Age of Onset     "Thyroid disease Mother     Memory loss Mother     Alzheimer's disease Mother     Other Mother         BCCA; Blood Clot    Heart disease Father     Atrial fibrillation Father     Cirrhosis Father     Depression Father     Other Father         SCCA    Alzheimer's disease Maternal Aunt     Breast cancer Maternal Grandmother 80    Atrial fibrillation Sister     Ovarian cancer Neg Hx     BRCA 1/2 Neg Hx        Objective   Vital Signs:  /70 (BP Location: Left arm, Patient Position: Sitting, Cuff Size: Adult)   Pulse 83   Temp 96.9 °F (36.1 °C) (Temporal)   Ht 164.6 cm (64.8\")   Wt 106 kg (234 lb 4.8 oz)   SpO2 96%   BMI 39.23 kg/m²     Patient's (Body mass index is 39.23 kg/m².) indicates that they are obese (BMI >30) with health related conditions that include obstructive sleep apnea, hypertension, coronary heart disease, and diabetes mellitus . Weight is unchanged. BMI is is above average; BMI management plan is completed. We discussed portion control and increasing exercise.           Physical Exam  Vitals reviewed.   Constitutional:       Appearance: Normal appearance.   HENT:      Head: Normocephalic and atraumatic.      Nose: Nose normal.      Mouth/Throat:      Mouth: Mucous membranes are moist.   Cardiovascular:      Rate and Rhythm: Normal rate and regular rhythm.      Heart sounds: No murmur heard.     No friction rub. No gallop.   Pulmonary:      Effort: Pulmonary effort is normal. No respiratory distress.      Breath sounds: Normal breath sounds. No wheezing or rhonchi.   Neurological:      Mental Status: She is alert and oriented to person, place, and time.   Psychiatric:         Behavior: Behavior normal.       Mallampati Score: III (soft and hard palate and base of uvula visible)    Result Review :              Assessment and Plan  Ana Lincoln is a 64 y.o. female with hyperlipidemia, hypothyroidism, anxiety, sicca syndrome, and GERD who presents for further evaluation of excessive " daytime sleepiness and fatigue, nonrestorative sleep, and concerns for sleep disordered breathing and obstructive sleep apnea. The patient's symptoms, particularly heavy snoring and witnessed episodes of apnea, are concerning for significant sleep disordered breathing and obstructive sleep apnea. We will obtain a home sleep test for further evaluation.  The patient will return for follow-up and recommendations after test.  I have discussed weight loss as it pertains to obstructive sleep apnea.     Diagnoses and all orders for this visit:    1. Sleep apnea, unspecified type (Primary)  -     Home Sleep Study; Future    2. Snoring  -     Home Sleep Study; Future    3. Excessive daytime sleepiness  -     Home Sleep Study; Future    4. Morbid (severe) obesity due to excess calories                 I discussed the consequences of uncontrolled sleep apnea including hypertension, heart disease, diabetes, stroke, and dementia. I further discussed sleep apnea therapeutic options including CPAP, Weight loss, Oral dental appliance, and surgery.         Follow Up  Return for Follow up after study.  Patient was given instructions and counseling regarding her condition or for health maintenance advice. Please see specific information pulled into the AVS if appropriate.     MICHELLE Acuna, ACNP-BC  Pulmonology, Critical Care, and Sleep Medicine

## 2024-09-11 ENCOUNTER — OFFICE VISIT (OUTPATIENT)
Dept: SLEEP MEDICINE | Age: 64
End: 2024-09-11
Payer: COMMERCIAL

## 2024-09-11 VITALS
HEART RATE: 83 BPM | DIASTOLIC BLOOD PRESSURE: 70 MMHG | TEMPERATURE: 96.9 F | OXYGEN SATURATION: 96 % | BODY MASS INDEX: 39.04 KG/M2 | SYSTOLIC BLOOD PRESSURE: 140 MMHG | WEIGHT: 234.3 LBS | HEIGHT: 65 IN

## 2024-09-11 DIAGNOSIS — G47.19 EXCESSIVE DAYTIME SLEEPINESS: ICD-10-CM

## 2024-09-11 DIAGNOSIS — E66.01 MORBID (SEVERE) OBESITY DUE TO EXCESS CALORIES: ICD-10-CM

## 2024-09-11 DIAGNOSIS — G47.30 SLEEP APNEA, UNSPECIFIED TYPE: Primary | ICD-10-CM

## 2024-09-11 DIAGNOSIS — R06.83 SNORING: ICD-10-CM

## 2024-09-11 PROCEDURE — 99203 OFFICE O/P NEW LOW 30 MIN: CPT | Performed by: NURSE PRACTITIONER

## 2024-09-13 ENCOUNTER — PATIENT ROUNDING (BHMG ONLY) (OUTPATIENT)
Dept: SLEEP MEDICINE | Age: 64
End: 2024-09-13
Payer: COMMERCIAL

## 2024-09-13 NOTE — PROGRESS NOTES
September 13, 2024    Hello, may I speak with Ana Lincoln?    My name is Joselyn      I am  with [facility]  [address]  [place] [address] [phone].    Before we get started may I verify your date of birth? 1960    I am calling to officially welcome you to our practice and ask about your recent visit. Is this a good time to talk? yes    Tell me about your visit with us. What things went well?  Monico was very direct which was good for her benefit. Everyone was very friendly.        We're always looking for ways to make our patients' experiences even better. Do you have recommendations on ways we may improve?  no    Overall were you satisfied with your first visit to our practice? yes       I appreciate you taking the time to speak with me today. Is there anything else I can do for you? no      Thank you, and have a great day.

## 2024-10-21 ENCOUNTER — TELEPHONE (OUTPATIENT)
Dept: OTHER | Facility: OTHER | Age: 64
End: 2024-10-21
Payer: COMMERCIAL

## 2024-10-21 NOTE — TELEPHONE ENCOUNTER
Called patient and rescheduled with a different provider Dr. Hernandez on 10/25/24 at 12:00pm. Patient stated understanding and thanked us for working with her.

## 2024-10-21 NOTE — TELEPHONE ENCOUNTER
"    Hub staff attempted to follow warm transfer process and was unsuccessful     Caller: Ana Lincoln \"Radha\"    Relationship to patient: Self    Best call back number: [phone]    Patient is needing: PT RCVD A CALL STATING THE APPT FOR 10/28 WITH DR. GONZALES NEEDS TO BE RESCHEDULED.  SHE IS A NEW PT AND HAS WAITED 3 MONTHS FOR THIS APPT - NOW THE NEXT AVAIL LOOKS LIKE IT WOULD BE FEB.  PLEASE CALL HER BACK TODAY, IF AT ALL POSSIBLE,  TO DISCUSS AS SHE WAS DIAGNOSED WITH LUPUS AND DOESN'T FEEL SHE CAN WAIT THAT LONG.      "

## 2024-10-25 ENCOUNTER — OFFICE VISIT (OUTPATIENT)
Age: 64
End: 2024-10-25
Payer: COMMERCIAL

## 2024-10-25 ENCOUNTER — LAB (OUTPATIENT)
Facility: HOSPITAL | Age: 64
End: 2024-10-25
Payer: COMMERCIAL

## 2024-10-25 VITALS
DIASTOLIC BLOOD PRESSURE: 80 MMHG | BODY MASS INDEX: 39.49 KG/M2 | HEIGHT: 65 IN | WEIGHT: 237 LBS | HEART RATE: 80 BPM | SYSTOLIC BLOOD PRESSURE: 132 MMHG | TEMPERATURE: 97.7 F

## 2024-10-25 DIAGNOSIS — Z79.899 HIGH RISK MEDICATION USE: ICD-10-CM

## 2024-10-25 DIAGNOSIS — Z96.653 HISTORY OF BILATERAL KNEE REPLACEMENT: ICD-10-CM

## 2024-10-25 DIAGNOSIS — M15.9 GENERALIZED OSTEOARTHROSIS, INVOLVING MULTIPLE SITES: ICD-10-CM

## 2024-10-25 DIAGNOSIS — R76.8 ANA POSITIVE: ICD-10-CM

## 2024-10-25 DIAGNOSIS — M35.00 SJOGREN'S SYNDROME, WITH UNSPECIFIED ORGAN INVOLVEMENT: ICD-10-CM

## 2024-10-25 DIAGNOSIS — R53.83 OTHER FATIGUE: ICD-10-CM

## 2024-10-25 DIAGNOSIS — M35.00 SJOGREN'S SYNDROME, WITH UNSPECIFIED ORGAN INVOLVEMENT: Primary | Chronic | ICD-10-CM

## 2024-10-25 DIAGNOSIS — M79.7 FIBROMYALGIA: ICD-10-CM

## 2024-10-25 PROBLEM — M32.9 SYSTEMIC LUPUS ERYTHEMATOSUS: Status: ACTIVE | Noted: 2024-10-25

## 2024-10-25 LAB
BASOPHILS # BLD AUTO: 0.04 10*3/MM3 (ref 0–0.2)
BASOPHILS NFR BLD AUTO: 0.6 % (ref 0–1.5)
BILIRUB UR QL STRIP: NEGATIVE
C3 SERPL-MCNC: 149 MG/DL (ref 82–167)
C4 SERPL-MCNC: 25 MG/DL (ref 14–44)
CLARITY UR: CLEAR
COLOR UR: YELLOW
DEPRECATED RDW RBC AUTO: 41.4 FL (ref 37–54)
EOSINOPHIL # BLD AUTO: 0.12 10*3/MM3 (ref 0–0.4)
EOSINOPHIL NFR BLD AUTO: 1.8 % (ref 0.3–6.2)
ERYTHROCYTE [DISTWIDTH] IN BLOOD BY AUTOMATED COUNT: 12.5 % (ref 12.3–15.4)
ERYTHROCYTE [SEDIMENTATION RATE] IN BLOOD: 22 MM/HR (ref 0–30)
GLUCOSE UR STRIP-MCNC: NEGATIVE MG/DL
HCT VFR BLD AUTO: 39.8 % (ref 34–46.6)
HGB BLD-MCNC: 13.2 G/DL (ref 12–15.9)
HGB UR QL STRIP.AUTO: NEGATIVE
HOLD SPECIMEN: NORMAL
IMM GRANULOCYTES # BLD AUTO: 0.04 10*3/MM3 (ref 0–0.05)
IMM GRANULOCYTES NFR BLD AUTO: 0.6 % (ref 0–0.5)
KETONES UR QL STRIP: NEGATIVE
LEUKOCYTE ESTERASE UR QL STRIP.AUTO: NEGATIVE
LYMPHOCYTES # BLD AUTO: 2.34 10*3/MM3 (ref 0.7–3.1)
LYMPHOCYTES NFR BLD AUTO: 34.4 % (ref 19.6–45.3)
MCH RBC QN AUTO: 29.5 PG (ref 26.6–33)
MCHC RBC AUTO-ENTMCNC: 33.2 G/DL (ref 31.5–35.7)
MCV RBC AUTO: 89 FL (ref 79–97)
MONOCYTES # BLD AUTO: 0.54 10*3/MM3 (ref 0.1–0.9)
MONOCYTES NFR BLD AUTO: 7.9 % (ref 5–12)
NEUTROPHILS NFR BLD AUTO: 3.72 10*3/MM3 (ref 1.7–7)
NEUTROPHILS NFR BLD AUTO: 54.7 % (ref 42.7–76)
NITRITE UR QL STRIP: NEGATIVE
NRBC BLD AUTO-RTO: 0 /100 WBC (ref 0–0.2)
PH UR STRIP.AUTO: 6 [PH] (ref 5–8)
PLATELET # BLD AUTO: 166 10*3/MM3 (ref 140–450)
PMV BLD AUTO: 11.7 FL (ref 6–12)
PROT UR QL STRIP: NEGATIVE
RBC # BLD AUTO: 4.47 10*6/MM3 (ref 3.77–5.28)
SP GR UR STRIP: 1.02 (ref 1–1.03)
UROBILINOGEN UR QL STRIP: NORMAL
WBC NRBC COR # BLD AUTO: 6.8 10*3/MM3 (ref 3.4–10.8)

## 2024-10-25 PROCEDURE — 85025 COMPLETE CBC W/AUTO DIFF WBC: CPT

## 2024-10-25 PROCEDURE — 86160 COMPLEMENT ANTIGEN: CPT

## 2024-10-25 PROCEDURE — 86200 CCP ANTIBODY: CPT

## 2024-10-25 PROCEDURE — 86038 ANTINUCLEAR ANTIBODIES: CPT

## 2024-10-25 PROCEDURE — 86146 BETA-2 GLYCOPROTEIN ANTIBODY: CPT

## 2024-10-25 PROCEDURE — 80053 COMPREHEN METABOLIC PANEL: CPT

## 2024-10-25 PROCEDURE — 86431 RHEUMATOID FACTOR QUANT: CPT

## 2024-10-25 PROCEDURE — 86037 ANCA TITER EACH ANTIBODY: CPT

## 2024-10-25 PROCEDURE — 84156 ASSAY OF PROTEIN URINE: CPT

## 2024-10-25 PROCEDURE — 81003 URINALYSIS AUTO W/O SCOPE: CPT

## 2024-10-25 PROCEDURE — 80074 ACUTE HEPATITIS PANEL: CPT

## 2024-10-25 PROCEDURE — 83516 IMMUNOASSAY NONANTIBODY: CPT

## 2024-10-25 PROCEDURE — 85613 RUSSELL VIPER VENOM DILUTED: CPT

## 2024-10-25 PROCEDURE — 86147 CARDIOLIPIN ANTIBODY EA IG: CPT

## 2024-10-25 PROCEDURE — 82550 ASSAY OF CK (CPK): CPT

## 2024-10-25 PROCEDURE — 86480 TB TEST CELL IMMUN MEASURE: CPT

## 2024-10-25 PROCEDURE — 86140 C-REACTIVE PROTEIN: CPT

## 2024-10-25 PROCEDURE — 85732 THROMBOPLASTIN TIME PARTIAL: CPT

## 2024-10-25 PROCEDURE — 85652 RBC SED RATE AUTOMATED: CPT

## 2024-10-25 PROCEDURE — 82570 ASSAY OF URINE CREATININE: CPT

## 2024-10-25 PROCEDURE — 36415 COLL VENOUS BLD VENIPUNCTURE: CPT

## 2024-10-25 RX ORDER — HYDROXYCHLOROQUINE SULFATE 200 MG/1
200 TABLET, FILM COATED ORAL 2 TIMES DAILY
Qty: 60 TABLET | Refills: 5 | Status: SHIPPED | OUTPATIENT
Start: 2024-10-25

## 2024-10-25 RX ORDER — PILOCARPINE HYDROCHLORIDE 5 MG/1
5 TABLET, FILM COATED ORAL
Qty: 30 TABLET | Refills: 5 | Status: SHIPPED | OUTPATIENT
Start: 2024-10-25

## 2024-10-25 NOTE — ASSESSMENT & PLAN NOTE
with 3 children,  heritage  Mother had Sjogren's syndrome.  +Chronic dry eyes and dry mouth.  Labs 6/5/2024: + OPAL, +SSA 1.3 / o/w negative WARREN panel, negative RF/sed rate, negative urinalysis, normal CBC/CMP  **Current: Plaquenil 10/25/2024, pilocarpine    She meets criteria for Sjogren syndrome with +OPAL/SSA with chronic dry eyes and dry mouth.  Her mother had Sjogren's.  Reassured her that I do not find evidence of coexistent lupus clinically or historically.  She does not meet criteria for lupus and specific autoantibodies for lupus are negative.  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  She has had both knees replaced with Dr. Schmidt.    -Recommend addition of pilocarpine with dinner for dryness  -We discussed there is no FDA approved medication for Sjogren's, but often Plaquenil can help with fatigue and arthralgias.  -Recommend addition of Plaquenil 200 mg twice daily  -Labs ordered for monitoring as below along with chest x-ray to screen for interstitial lung disease  -Discussed increased rate of lymphoma seen with Sjogren syndrome  -handout provided on Sjogren syndrome, fibromyalgia, osteoarthritis, Plaquenil  -Return to clinic 4 months

## 2024-10-25 NOTE — ASSESSMENT & PLAN NOTE
The symptoms are predominantly neuropathic. Patient description of joints are most consistent with neuropathic symptoms/dysesthesias seen in a variety of neuropathic states including Fibromyalgia. Neuropathic medications form the basis of treatment for these conditions. Narcotics have no role in the treatment of neuropathic pain. I explained to her that neuropathic syndromes are generally long term syndromes that don't go away.     I encouraged regular low-impact exercise up to 30 minutes/day.  If this is unattainable, recommended graded exercise program starting with 5 minutes of dedicated cardiovascular exercise daily, increasing by 1 minute daily until goal of 30 minutes daily is reached.    -Recommend conservative measures to improve sleep  -Consider referral to sleep medicine for GLORIA screening-   -Counseled on alternative therapies that can help with chronic pain including massage therapy, yoga, ace chi

## 2024-10-25 NOTE — ASSESSMENT & PLAN NOTE
Plaquenil    We discussed possible side effects of hydroxychloroquine including headache, nausea, diarrhea, rare retinal pigmentation, rare neuromuscular toxicity.  Recommend eye exams every 6 to 12 months to monitor for retinal toxicity.

## 2024-10-25 NOTE — PROGRESS NOTES
Office Visit       Date: 10/25/2024   Patient Name: Ana Lincoln  MRN: [id number]  YOB: 1960    Referring Physician: Jeanette Greenberg MD     Chief Complaint: Sjogren syndrome  Chief Complaint   Patient presents with    Establish Care       History of Present Illness: Ana Lincoln is a 64 y.o. female who is here today consultation from her PCP for polyarthralgia.  The patient reports she was diagnosed previously around 2018 with Sjogren syndrome.  Her mother had Sjogren syndrome.  Labs have shown positive OPAL/positive SSA.  She reports chronic dry eyes and dry mouth.  She is also concerned she could have lupus.    She previously saw a rheumatologist in 2018 and was prescribed Plaquenil.  She does not recall whether it helped or not.  She denies Raynaud's, malar rash, photosensitivity, oral nasal ulcers, pleurisy/pericarditis, renal/hematologic otherwise, clotting disorder, seizure disorder, iritis, psoriasis.    She reports frequent widespread musculoskeletal pain.  Sometimes her muscles are sore to press on.  Her knees and spine ache frequently.  Activity makes it worse.  Rest makes it better.  Over-the-counter NSAIDs are helpful for her joints.    No swollen joints.  No fevers or weight loss.    She does have fatigue, dry mouth, dry eyes, difficulty swallowing, hearing loss, nasal drainage, sinus troubles, palpitations, bloating, heartburn, nausea, hair loss, heat intolerance, dizziness, anxiety, bruising, back pain, joint pain tenderness, morning stiffness, muscle cramping, muscle pain, neck pain and stiffness, easy bleeding and bruising.    Records reviewed:  Labs 6/5/2024: Normal CBC, normal CMP, normal TSH, + OPAL, +SSA 1.3 / otherwise negative WARREN panel, negative rheumatoid factor, normal sed rate, hemoglobin A1c 5.6, negative urinalysis      Subjective     Review of Systems: Review of Systems   Constitutional:  Positive for fatigue. Negative for chills,  fever and unexpected weight loss.        Night sweats     HENT:  Positive for trouble swallowing. Negative for mouth sores, sinus pressure and sore throat.         Dry eyes  Dry mouth  Nasal drainage  Sinusitis     Eyes:  Negative for pain and redness.   Respiratory:  Positive for shortness of breath and wheezing. Negative for cough.    Cardiovascular:  Positive for chest pain and palpitations.   Gastrointestinal:  Positive for nausea and GERD. Negative for abdominal pain, blood in stool, diarrhea and vomiting.        Bloating     Endocrine: Positive for heat intolerance. Negative for polydipsia and polyuria.        Hair loss     Genitourinary:  Negative for dysuria, genital sores and hematuria.   Musculoskeletal:  Positive for arthralgias, back pain, joint swelling, myalgias, neck pain and neck stiffness.        Morning stiffness  Muscle cramping     Skin:  Negative for rash and bruise.   Allergic/Immunologic: Positive for environmental allergies. Negative for immunocompromised state.   Neurological:  Positive for dizziness. Negative for seizures, weakness, numbness and memory problem.   Hematological:  Negative for adenopathy. Bruises/bleeds easily.   Psychiatric/Behavioral:  Negative for depressed mood. The patient is nervous/anxious.         Past Medical History:   Past Medical History:   Diagnosis Date    Anxiety     Arthritis     Calculus of kidney     Carbuncle and furuncle of buttock 08/16/2012    Chalazion     GERD (gastroesophageal reflux disease)     Goiter     H/O bone density study 03/28/2018    nl    High cholesterol     Hyperlipidemia     Hypothyroid     Insomnia     Kidney stone     Lupus (systemic lupus erythematosus)     Persistent disorder of initiating or maintaining sleep     Routine general medical examination at a health care facility 02/19/2013    Routine general medical examination at a health care facility 02/16/2012    Sacroiliac pain     Sicca syndrome     Sleep apnea     Visit for  routine gyn exam 02/16/2012       Past Surgical History:   Past Surgical History:   Procedure Laterality Date    BREAST BIOPSY Left 05/09/2022    u/s    HERNIA REPAIR      HYSTERECTOMY      complete    KNEE ARTHROPLASTY, PARTIAL REPLACEMENT Bilateral     REPLACEMENT TOTAL KNEE BILATERAL      TOTAL ABDOMINAL HYSTERECTOMY WITH SALPINGO OOPHORECTOMY Bilateral 2002    UMBILICAL HERNIA REPAIR         Family History:   Family History   Problem Relation Age of Onset    Thyroid disease Mother     Memory loss Mother     Alzheimer's disease Mother     Arthritis Mother     Hypothyroidism Mother     Sjogren's syndrome Mother     Heart disease Father     Atrial fibrillation Father     Cirrhosis Father     Depression Father     Skin cancer Father     Dementia Father     Heart attack Father     Hypertension Father     Atrial fibrillation Sister     Supraventricular tachycardia Sister     Skin cancer Sister     Skin cancer Brother     Hypertension Brother     Alzheimer's disease Maternal Aunt     Breast cancer Maternal Grandmother 80    Ovarian cancer Neg Hx     BRCA 1/2 Neg Hx        Social History:   Social History     Socioeconomic History    Marital status:    Tobacco Use    Smoking status: Former    Smokeless tobacco: Never    Tobacco comments:     quit 30 + years   Vaping Use    Vaping status: Never Used   Substance and Sexual Activity    Alcohol use: Yes     Alcohol/week: 5.0 standard drinks of alcohol     Types: 5 Glasses of wine per week     Comment: red wine    Drug use: No    Sexual activity: Yes     Partners: Male     Birth control/protection: Post-menopausal, Surgical       Medications:   Current Outpatient Medications:     cetirizine (ZyrTEC) 10 MG tablet, Take 1 tablet by mouth Daily., Disp: , Rfl:     fluticasone (FLONASE) 50 MCG/ACT nasal spray, 2 sprays into each nostril Daily. Administer 2 sprays in each nostril for each dose., Disp: 16 g, Rfl: 5    levothyroxine (SYNTHROID, LEVOTHROID) 150 MCG tablet,  "TAKE ONE TABLET BY MOUTH DAILY, Disp: 30 tablet, Rfl: 4    naproxen (NAPROSYN) 500 MG tablet, TAKE 1 TABLET BY MOUTH EVERY EVENING AS NEEDED FOR MILD PAIN, Disp: 30 tablet, Rfl: 5    omeprazole (priLOSEC) 20 MG capsule, TAKE ONE CAPSULE BY MOUTH DAILY *NEED APPOINTMENT FOR FURTHER REFILLS*, Disp: 14 capsule, Rfl: 0    pravastatin (PRAVACHOL) 20 MG tablet, Take 1 tablet by mouth every night at bedtime., Disp: 30 tablet, Rfl: 5    zolpidem (AMBIEN) 10 MG tablet, Take 1 tablet by mouth At Night As Needed for Sleep. for sleep, Disp: 30 tablet, Rfl: 1    hydroxychloroquine (PLAQUENIL) 200 MG tablet, Take 1 tablet by mouth 2 (Two) Times a Day., Disp: 60 tablet, Rfl: 5    pilocarpine (SALAGEN) 5 MG tablet, Take 1 tablet by mouth Daily With Dinner. For dryness, Disp: 30 tablet, Rfl: 5    Allergies:   Allergies   Allergen Reactions    Morphine And Codeine Swelling     Tongue swells, doesn't help pain    Anesthetics, Amide Provider Review Needed    Bee Pollen Provider Review Needed    Erythromycin Nausea Only    Requip [Ropinirole Hcl] Nausea Only       I have reviewed and updated the patient's chief complaint, history of present illness, review of systems, past medical history, surgical history, family history, social history, medications and allergy list as appropriate.     Objective      Vital Signs:   Vitals:    10/25/24 1150   BP: 132/80   BP Location: Left arm   Patient Position: Sitting   Cuff Size: Adult   Pulse: 80   Temp: 97.7 °F (36.5 °C)   Weight: 108 kg (237 lb)   Height: 165.1 cm (65\")   PainSc:   5   PainLoc: Generalized     Body mass index is 39.44 kg/m².       Physical Exam:  Physical Exam   MUSCULOSKELETAL:   No peripheral synovitis.  No dactylitis.  No pitting the nails.  Normal capillaroscopy.  Enlargement CMC joint thumbs  Widespread tender points present above and below the waist  Crepitus bilateral knees    Complete joint exam was performed including the MCPs, PIPs, DIPs of the hands, wrists, elbows, " "shoulders, hips, knees and ankles.  No soft tissue swelling or tenderness is present except as above.    General: The patient is well-developed and well nourished. Cooperative, alert and oriented. Affect is normal. Hydration appears normal.   HEENT: Normocephalic and atraumatic. Lids and conjunctiva are normal. Pupils are equal and sclera are clear. Oropharynx is clear and dry  NECK neck is supple without adenopathy, masses or thyromegaly.   CARDIOVASCULAR: Regular rate and rhythm. No murmurs, rubs or gallops   LUNGS: Effort is normal. Lungs are clear bilateral   ABDOMEN: Not examined  EXTREMITIES: Peripheral pulses are intact. No clubbing.   SKIN: No rashes. No subcutaneous nodules. No digital ulcers. No sclerodactyly.   NEUROLOGIC: Gait is normal. Strength testing is normal.  No focal neurologic deficits    Results Review:   Labs:    Lab Results   Component Value Date    GLUCOSE 91 01/20/2021    BUN 12 01/20/2021    CREATININE 0.60 01/20/2021    EGFR 107 05/26/2016    BCR 20.0 01/20/2021    K 3.9 01/20/2021    CO2 25.3 01/20/2021    CALCIUM 9.0 01/20/2021    ALBUMIN 4.40 01/20/2021    BILITOT 0.4 01/20/2021    AST 19 01/20/2021    ALT 12 01/20/2021     Lab Results   Component Value Date    WBC 6.00 01/20/2021    HGB 13.7 01/20/2021    HCT 40.4 01/20/2021    MCV 88.8 01/20/2021     01/20/2021     Lab Results   Component Value Date    SEDRATE 30 09/11/2018     Lab Results   Component Value Date    CRP 1.05 (H) 09/11/2018     No results found for: \"QUANTIFERO\", \"QUANTITB1\", \"QUANTITB2\", \"QUANTIFERN\", \"QUANTIFERM\", \"QUANTITBGLDP\"  No results found for: \"RF\"  Lab Results   Component Value Date    HEPCVIRUSABY Non-Reactive 09/15/2017           Procedures    Assessment / Plan        Assessment & Plan  Sjogren's syndrome, with unspecified organ involvement   with 3 children,  heritage  Mother had Sjogren's syndrome.  +Chronic dry eyes and dry mouth.  Labs 6/5/2024: + OPAL, +SSA 1.3 / o/w " negative WARREN panel, negative RF/sed rate, negative urinalysis, normal CBC/CMP  **Current: Plaquenil 10/25/2024, pilocarpine    She meets criteria for Sjogren syndrome with +OPAL/SSA with chronic dry eyes and dry mouth.  Her mother had Sjogren's.  Reassured her that I do not find evidence of coexistent lupus clinically or historically.  She does not meet criteria for lupus and specific autoantibodies for lupus are negative.  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  She has had both knees replaced with Dr. Schmidt.    -Recommend addition of pilocarpine with dinner for dryness  -We discussed there is no FDA approved medication for Sjogren's, but often Plaquenil can help with fatigue and arthralgias.  -Recommend addition of Plaquenil 200 mg twice daily  -Labs ordered for monitoring as below along with chest x-ray to screen for interstitial lung disease  -Discussed increased rate of lymphoma seen with Sjogren syndrome  -handout provided on Sjogren syndrome, fibromyalgia, osteoarthritis, Plaquenil  -Return to clinic 4 months    High risk medication use  Plaquenil    We discussed possible side effects of hydroxychloroquine including headache, nausea, diarrhea, rare retinal pigmentation, rare neuromuscular toxicity.  Recommend eye exams every 6 to 12 months to monitor for retinal toxicity.  OPAL positive  + OPAL with SSA positivity but otherwise negative WARREN panel    Likely due to Sjogren syndrome.  No clinical evidence of coexistent lupus or other connective tissue disease.  Specific markers for lupus are negative  Fibromyalgia  The symptoms are predominantly neuropathic. Patient description of joints are most consistent with neuropathic symptoms/dysesthesias seen in a variety of neuropathic states including Fibromyalgia. Neuropathic medications form the basis of treatment for these conditions. Narcotics have no role in the treatment of neuropathic pain. I explained to her that neuropathic syndromes  are generally long term syndromes that don't go away.     I encouraged regular low-impact exercise up to 30 minutes/day.  If this is unattainable, recommended graded exercise program starting with 5 minutes of dedicated cardiovascular exercise daily, increasing by 1 minute daily until goal of 30 minutes daily is reached.    -Recommend conservative measures to improve sleep  -Consider referral to sleep medicine for GLORIA screening-   -Counseled on alternative therapies that can help with chronic pain including massage therapy, yoga, ace chi  Generalized osteoarthrosis, involving multiple sites  -Continue as needed NSAID  -Weight loss and Mediterranean diet would be helpful  History of bilateral knee replacement  Dr. Schmidt  orthopedics  Other fatigue      TIME SPENT: I spent 60 minutes caring for the patient on this date of service.  This time includes time spent by me in the following activities: Preparing for the visit, obtaining records, reviewing/ordering tests and independently reviewing results, performing a medically appropriate history/exam, counseling and educating the patient/family/caregiver, ordering medications, tests, or procedures, and documenting information in the medical record.    Orders Placed This Encounter   Procedures    XR Chest 2 View    CBC Auto Differential    Comprehensive Metabolic Panel    C-reactive Protein    Sedimentation Rate    Urinalysis With Culture If Indicated -    Rheumatoid Factor    Cyclic Citrul Peptide Antibody, IgG / IgA    OPAL by IFA, Reflex 9-biomarkers profile    ANCA Panel    CK    Hepatitis Panel, Acute    QuantiFERON-TB Gold Plus    Protein / Creatinine Ratio, Urine - Urine, Clean Catch    C4+C3    Anticardiolipin Antibody, IgG / M, Qn    Beta-2 Glycoprotein Antibodies    Lupus Anticoagulant Reflex     New Medications Ordered This Visit   Medications    hydroxychloroquine (PLAQUENIL) 200 MG tablet     Sig: Take 1 tablet by mouth 2 (Two) Times a Day.     Dispense:  60  tablet     Refill:  5    pilocarpine (SALAGEN) 5 MG tablet     Sig: Take 1 tablet by mouth Daily With Dinner. For dryness     Dispense:  30 tablet     Refill:  5           Follow Up:   Return in about 4 months (around 2/25/2025).        Matt Hernandez MD  [facility] [place] [facility]

## 2024-10-25 NOTE — PATIENT INSTRUCTIONS
Antinuclear Antibody Test    Why am I having this test?  This is a test that is used to help diagnose systemic lupus erythematosus (SLE) and other autoimmune diseases. An autoimmune disease is a disease in which the body's own defense system (immune system) attacks its organs.  What is being tested?  This test checks for antinuclear antibodies (OPAL) in the blood. The presence of OPAL is associated with several autoimmune diseases. It is seen in almost all people with lupus.  What kind of sample is taken?    A blood sample is required for this test. It is usually collected by inserting a needle into a blood vessel.  How are the results reported?  Your test results will be reported as either positive or negative.  What do the results mean?  A positive test result may mean that you have:  Lupus.  Other autoimmune diseases, such as rheumatoid arthritis, scleroderma, or Sjögren syndrome.  Talk with your health care provider about what your results mean. In some cases, your health care provider may do more testing to confirm the results. More testing may be done because other conditions can sometimes cause a positive result, such as:  Liver dysfunction.  Myasthenia gravis.  Infectious mononucleosis.  Questions to ask your health care provider  Ask your health care provider, or the department that is doing the test:  When will my results be ready?  How will I get my results?  What are my treatment options?  What other tests do I need?  What are my next steps?  Summary  This is a test that is used to help diagnose systemic lupus erythematosus (SLE) and other autoimmune diseases. An autoimmune disease is a disease in which the body's own defense system (immune system) attacks the body.  This test checks for antinuclear antibodies (OPAL) in the blood. The presence of OPAL is associated with several autoimmune diseases. It is seen in almost all people with lupus.  Your test results will be reported as either positive or negative.  Talk with your health care provider about what your results mean.  This information is not intended to replace advice given to you by your health care provider. Make sure you discuss any questions you have with your health care provider.  Document Revised: 08/21/2022 Document Reviewed: 08/21/2022  Elsevier Patient Education © 2024 Elsevier Inc. Myofascial Pain Syndrome and Fibromyalgia    Myofascial pain syndrome and fibromyalgia are both pain disorders. You may feel this pain mainly in your muscles.  Myofascial pain syndrome:  Always has tender points in the muscles that will cause pain when pressed (trigger points). The pain may come and go.  Usually affects your neck, upper back, and shoulder areas. The pain often moves into your arms and hands.  Fibromyalgia:  Has muscle pains and tenderness that come and go.  Is often associated with tiredness (fatigue) and sleep problems.  Has trigger points.  Tends to be long-lasting (chronic), but is not life-threatening.  Fibromyalgia and myofascial pain syndrome are not the same. However, they often occur together. If you have both conditions, each can make the other worse. Both are common and can cause enough pain and fatigue to make day-to-day activities difficult. Both can be hard to diagnose because their symptoms are common in many other conditions.    What are the causes?  The exact causes of these conditions are not known.    What increases the risk?  You are more likely to develop either of these conditions if:  You have a family history of the condition.  You are female.  You have certain triggers, such as:  Spine disorders.  An injury (trauma) or other physical stressors.  Being under a lot of stress.  Medical conditions such as osteoarthritis, rheumatoid arthritis, or lupus.    What are the signs or symptoms?    Fibromyalgia  The main symptom of fibromyalgia is widespread pain and tenderness in your muscles. Pain is sometimes described as stabbing, shooting, or  burning.  You may also have:  Tingling or numbness.  Sleep problems and fatigue.  Problems with attention and concentration (fibro fog).  Other symptoms may include:  Bowel and bladder problems.  Headaches.  Vision problems.  Sensitivity to odors and noises.  Depression or mood changes.  Painful menstrual periods (dysmenorrhea).  Dry skin or eyes.  These symptoms can vary over time.    Myofascial pain syndrome  Symptoms of myofascial pain syndrome include:  Tight, ropy bands of muscle.  Uncomfortable sensations in muscle areas. These may include aching, cramping, burning, numbness, tingling, and weakness.  Difficulty moving certain parts of the body freely (poor range of motion).    How is this diagnosed?  This condition may be diagnosed by your symptoms and medical history. You will also have a physical exam. In general:  Fibromyalgia is diagnosed if you have pain, fatigue, and other symptoms for more than 3 months, and symptoms cannot be explained by another condition.  Myofascial pain syndrome is diagnosed if you have trigger points in your muscles, and those trigger points are tender and cause pain elsewhere in your body (referred pain).    How is this treated?    Treatment for these conditions depends on the type that you have.  For fibromyalgia, a healthy lifestyle is the most important treatment including aerobic and strength exercises. Different types of medicines are used to help treat pain and include:  NSAIDs.  Medicines for treating depression.  Medicines that help control seizures.  Medicines that relax the muscles.  Treatment for myofascial pain syndrome includes:  Pain medicines, such as NSAIDs.  Cooling and stretching of muscles.  Massage therapy with myofascial release technique.  Trigger point injections.    Treating these conditions often requires a team of health care providers. These may include:  Your primary care provider.  A physical therapist.  Complementary health care providers, such as  massage therapists or acupuncturists.  A psychiatrist for cognitive behavioral therapy.    Follow these instructions at home:  Medicines  Take over-the-counter and prescription medicines only as told by your health care provider.  Ask your health care provider if the medicine prescribed to you:  Requires you to avoid driving or using machinery.  Can cause constipation. You may need to take these actions to prevent or treat constipation:  Drink enough fluid to keep your urine pale yellow.  Take over-the-counter or prescription medicines.  Eat foods that are high in fiber, such as beans, whole grains, and fresh fruits and vegetables.  Limit foods that are high in fat and processed sugars, such as fried or sweet foods.    Lifestyle    Do exercises as told by your health care provider or physical therapist.  Practice relaxation techniques to control your stress. You may want to try:  Biofeedback.  Visual imagery.  Hypnosis.  Muscle relaxation.  Yoga.  Meditation.  Maintain a healthy lifestyle. This includes eating a healthy diet and getting enough sleep.  Do not use any products that contain nicotine or tobacco. These products include cigarettes, chewing tobacco, and vaping devices, such as e-cigarettes. If you need help quitting, ask your health care provider.    General instructions  Talk to your health care provider about complementary treatments, such as acupuncture or massage.  Do not do activities that stress or strain your muscles. This includes repetitive motions and heavy lifting.  Keep all follow-up visits. This is important.    Where to find support  Consider joining a support group with others who are diagnosed with this condition.  National Fibromyalgia Association: fmaware.org    Where to find more information  [place] Pain Foundation: uspainfoundation.org    Contact a health care provider if:  You have new symptoms.  Your symptoms get worse or your pain is severe.  You have side effects from your  medicines.  You have trouble sleeping.  Your condition is causing depression or anxiety.    Get help right away if:  You have thoughts of hurting yourself or others.    Get help right away if you feel like you may hurt yourself or others, or have thoughts about taking your own life. Go to your nearest emergency room or:  Call 911.  Call the National Suicide Prevention Lifeline at 1-604.141.7966 or 806. This is open 24 hours a day.  Text the Crisis Text Line at 391822.  This information is not intended to replace advice given to you by your health care provider. Make sure you discuss any questions you have with your health care provider.  Document Revised: 09/25/2023 Document Reviewed: 11/18/2022  [employer] Patient Education © 2024 [employer]. Hydroxychloroquine Tablets    What is this medication?  HYDROXYCHLOROQUINE (halina drox ee KLOR oh kwin) treats autoimmune conditions, such as rheumatoid arthritis and lupus. It works by slowing down an overactive immune system. It may also be used to prevent and treat malaria. It works by killing the parasite that causes malaria. It belongs to a group of medications called DMARDs.  This medicine may be used for other purposes; ask your health care provider or pharmacist if you have questions.  COMMON BRAND NAME(S): Plaquenil, Quineprox    What should I tell my care team before I take this medication?  They need to know if you have any of these conditions:  Diabetes  Eye disease, vision problems  Frequently drink alcohol  G6PD deficiency  Heart disease  Irregular heartbeat or rhythm  Kidney disease  Liver disease  Porphyria  Psoriasis  An unusual or allergic reaction to hydroxychloroquine, other medications, foods, dyes, or preservatives  Pregnant or trying to get pregnant  Breastfeeding    How should I use this medication?  Take this medication by mouth with water. Take it as directed on the prescription label. Do not cut, crush, or chew this medication. Swallow the tablets  whole. Take it with food. Do not take it more than directed. Take all of this medication unless your care team tells you to stop it early. Keep taking it even if you think you are better.  Take products with antacids in them at a different time of day than this medication. Take this medication 4 hours before or 4 hours after antacids. Talk to your care team if you have questions.  Talk to your care team about the use of this medication in children. While this medication may be prescribed for selected conditions, precautions do apply.  Overdosage: If you think you have taken too much of this medicine contact a poison control center or emergency room at once.  NOTE: This medicine is only for you. Do not share this medicine with others.    What if I miss a dose?  If you miss a dose, take it as soon as you can. If it is almost time for your next dose, take only that dose. Do not take double or extra doses.    What may interact with this medication?  Do not take this medication with any of the following:  Cisapride  Dronedarone  Pimozide  Thioridazine  This medication may also interact with the following:  Ampicillin  Antacids  Cimetidine  Cyclosporine  Digoxin  Kaolin  Medications for diabetes, such as insulin, glipizide, glyburide  Medications for seizures, such as carbamazepine, phenobarbital, phenytoin  Mefloquine  Methotrexate  Other medications that cause heart rhythm changes  Praziquantel  This list may not describe all possible interactions. Give your health care provider a list of all the medicines, herbs, non-prescription drugs, or dietary supplements you use. Also tell them if you smoke, drink alcohol, or use illegal drugs. Some items may interact with your medicine.    What should I watch for while using this medication?  Visit your care team for regular checks on your progress. Tell your care team if your symptoms do not start to get better or if they get worse.  You may need blood work done while you are  taking this medication. If you take other medications that can affect heart rhythm, you may need more testing. Talk to your care team if you have questions.  Your vision may be tested before and during use of this medication. Tell your care team right away if you have any change in your eyesight.  This medication may cause serious skin reactions. They can happen weeks to months after starting the medication. Contact your care team right away if you notice fevers or flu-like symptoms with a rash. The rash may be red or purple and then turn into blisters or peeling of the skin. Or, you might notice a red rash with swelling of the face, lips or lymph nodes in your neck or under your arms.  If you or your family notice any changes in your behavior, such as new or worsening depression, thoughts of harming yourself, anxiety, or other unusual or disturbing thoughts, or memory loss, call your care team right away.    What side effects may I notice from receiving this medication?  Side effects that you should report to your care team as soon as possible:  Allergic reactions--skin rash, itching, hives, swelling of the face, lips, tongue, or throat  Aplastic anemia--unusual weakness or fatigue, dizziness, headache, trouble breathing, increased bleeding or bruising  Change in vision  Heart rhythm changes--fast or irregular heartbeat, dizziness, feeling faint or lightheaded, chest pain, trouble breathing  Infection--fever, chills, cough, or sore throat  Low blood sugar (hypoglycemia)--tremors or shaking, anxiety, sweating, cold or clammy skin, confusion, dizziness, rapid heartbeat  Muscle injury--unusual weakness or fatigue, muscle pain, dark yellow or brown urine, decrease in amount of urine  Pain, tingling, or numbness in the hands or feet  Rash, fever, and swollen lymph nodes  Redness, blistering, peeling, or loosening of the skin, including inside the mouth  Thoughts of suicide or self-harm, worsening mood, or feelings of  depression  Unusual bruising or bleeding  Side effects that usually do not require medical attention (report to your care team if they continue or are bothersome):  Diarrhea  Headache  Nausea  Stomach pain  Vomiting  This list may not describe all possible side effects. Call your doctor for medical advice about side effects. You may report side effects to FDA at 2-190-IIC-0317.    Where should I keep my medication?  Keep out of the reach of children and pets.  Store at room temperature up to 30 degrees C (86 degrees F). Protect from light. Get rid of any unused medication after the expiration date.  To get rid of medications that are no longer needed or have :  Take the medication to a medication take-back program. Check with your pharmacy or law enforcement to find a location.  If you cannot return the medication, check the label or package insert to see if the medication should be thrown out in the garbage or flushed down the toilet. If you are not sure, ask your care team. If it is safe to put it in the trash, empty the medication out of the container. Mix the medication with cat litter, dirt, coffee grounds, or other unwanted substance. Seal the mixture in a bag or container. Put it in the trash.  NOTE: This sheet is a summary. It may not cover all possible information. If you have questions about this medicine, talk to your doctor, pharmacist, or health care provider.  ©  Elsevier/Gold Standard (2023 00:00:00)

## 2024-10-25 NOTE — ASSESSMENT & PLAN NOTE
+ OPAL with SSA positivity but otherwise negative WARREN panel    Likely due to Sjogren syndrome.  No clinical evidence of coexistent lupus or other connective tissue disease.  Specific markers for lupus are negative

## 2024-10-26 LAB
ALBUMIN SERPL-MCNC: 3.9 G/DL (ref 3.5–5.2)
ALBUMIN/GLOB SERPL: 1.3 G/DL
ALP SERPL-CCNC: 86 U/L (ref 39–117)
ALT SERPL W P-5'-P-CCNC: 11 U/L (ref 1–33)
ANION GAP SERPL CALCULATED.3IONS-SCNC: 9.4 MMOL/L (ref 5–15)
AST SERPL-CCNC: 15 U/L (ref 1–32)
BILIRUB SERPL-MCNC: 0.2 MG/DL (ref 0–1.2)
BUN SERPL-MCNC: 15 MG/DL (ref 8–23)
BUN/CREAT SERPL: 19 (ref 7–25)
CALCIUM SPEC-SCNC: 9 MG/DL (ref 8.6–10.5)
CARDIOLIPIN IGG SER IA-ACNC: <9 GPL U/ML (ref 0–14)
CARDIOLIPIN IGM SER IA-ACNC: 12 MPL U/ML (ref 0–12)
CHLORIDE SERPL-SCNC: 105 MMOL/L (ref 98–107)
CHROMATIN AB SERPL-ACNC: <10 IU/ML (ref 0–14)
CK SERPL-CCNC: 72 U/L (ref 20–180)
CO2 SERPL-SCNC: 25.6 MMOL/L (ref 22–29)
CREAT SERPL-MCNC: 0.79 MG/DL (ref 0.57–1)
CREAT UR-MCNC: 99.2 MG/DL
CRP SERPL-MCNC: 0.76 MG/DL (ref 0–0.5)
EGFRCR SERPLBLD CKD-EPI 2021: 84.7 ML/MIN/1.73
GLOBULIN UR ELPH-MCNC: 2.9 GM/DL
GLUCOSE SERPL-MCNC: 90 MG/DL (ref 65–99)
HAV IGM SERPL QL IA: NORMAL
HBV CORE IGM SERPL QL IA: NORMAL
HBV SURFACE AG SERPL QL IA: NORMAL
HCV AB SER QL: NORMAL
POTASSIUM SERPL-SCNC: 4.3 MMOL/L (ref 3.5–5.2)
PROT ?TM UR-MCNC: 8.9 MG/DL
PROT SERPL-MCNC: 6.8 G/DL (ref 6–8.5)
PROT/CREAT UR: 89.7 MG/G CREA (ref 0–200)
SODIUM SERPL-SCNC: 140 MMOL/L (ref 136–145)
SPECIMEN STATUS: NORMAL

## 2024-10-27 LAB
LA 2 SCREEN W REFLEX-IMP: NORMAL
SCREEN APTT: 33.4 SEC (ref 0–43.5)
SCREEN DRVVT: 40.4 SEC (ref 0–47)

## 2024-10-28 LAB
B2 GLYCOPROT1 IGA SER-ACNC: <9 GPI IGA UNITS (ref 0–25)
B2 GLYCOPROT1 IGG SER-ACNC: <9 GPI IGG UNITS (ref 0–20)
B2 GLYCOPROT1 IGM SER-ACNC: <9 GPI IGM UNITS (ref 0–32)
C-ANCA TITR SER IF: NORMAL TITER
CCP IGA+IGG SERPL IA-ACNC: 9 UNITS (ref 0–19)
MYELOPEROXIDASE AB SER IA-ACNC: <0.2 UNITS (ref 0–0.9)
P-ANCA ATYPICAL TITR SER IF: NORMAL TITER
P-ANCA TITR SER IF: NORMAL TITER
PROTEINASE3 AB SER IA-ACNC: <0.2 UNITS (ref 0–0.9)

## 2024-10-29 LAB
ANA SER QL IF: NEGATIVE
GAMMA INTERFERON BACKGROUND BLD IA-ACNC: 0.07 IU/ML
LABORATORY COMMENT REPORT: NORMAL
M TB IFN-G BLD-IMP: NEGATIVE
M TB IFN-G CD4+ BCKGRND COR BLD-ACNC: 0.2 IU/ML
M TB IFN-G CD4+CD8+ BCKGRND COR BLD-ACNC: 0.1 IU/ML
MITOGEN IGNF BCKGRD COR BLD-ACNC: >10 IU/ML
QUANTIFERON INCUBATION: NORMAL
SERVICE CMNT-IMP: NORMAL

## 2024-10-30 ENCOUNTER — TELEPHONE (OUTPATIENT)
Age: 64
End: 2024-10-30
Payer: COMMERCIAL

## 2024-10-30 NOTE — TELEPHONE ENCOUNTER
"    Hub staff attempted to follow warm transfer process and was unsuccessful     Caller: Ana Lincoln \"Radha\"    Relationship to patient: Self    Best call back number: [phone]     Patient is needing: [facility] PHARM[facility]Y IS QUESTIONING PILOCARPINE, NEEDS HELP TO GET APPROVED ASAP.   "

## 2024-10-30 NOTE — TELEPHONE ENCOUNTER
Spoke with Pharm. And clarified RX for Pilocarpine is correct---ADB clarified.    LVM notifying pt.

## 2024-11-05 ENCOUNTER — HOSPITAL ENCOUNTER (OUTPATIENT)
Dept: SLEEP MEDICINE | Facility: HOSPITAL | Age: 64
Discharge: HOME | End: 2024-11-05
Admitting: NURSE PRACTITIONER
Payer: COMMERCIAL

## 2024-11-05 VITALS — HEIGHT: 65 IN | WEIGHT: 237 LBS | BODY MASS INDEX: 39.49 KG/M2

## 2024-11-05 DIAGNOSIS — G47.30 SLEEP APNEA, UNSPECIFIED TYPE: ICD-10-CM

## 2024-11-05 DIAGNOSIS — G47.19 EXCESSIVE DAYTIME SLEEPINESS: ICD-10-CM

## 2024-11-05 DIAGNOSIS — R06.83 SNORING: ICD-10-CM

## 2024-11-05 PROCEDURE — 95800 SLP STDY UNATTENDED: CPT

## 2024-11-06 DIAGNOSIS — G47.33 OSA (OBSTRUCTIVE SLEEP APNEA): Primary | ICD-10-CM

## 2025-01-30 ENCOUNTER — TELEPHONE (OUTPATIENT)
Dept: SLEEP MEDICINE | Age: 65
End: 2025-01-30
Payer: COMMERCIAL

## 2025-01-30 NOTE — TELEPHONE ENCOUNTER
LVM FOR PATIENT TO SEE IF SHE WANTS TO FOLLOW-UP WITH AN APPOINTMENT TO MOVE FORWARD. I GAVE HER OUR NUMBER TO RETURN CALL.

## 2025-02-05 NOTE — PROGRESS NOTES
Sleep Clinic Follow Up Note    Chief Complaint  Follow-up, Sleep Apnea, and Results    Subjective     History of Present Illness (from previous encounter on 9/11/2024):  Ana Lincoln is a 64 y.o. female with hyperlipidemia, hypothyroidism, anxiety, sicca syndrome, and GERD who presents for further evaluation of excessive daytime sleepiness and fatigue, nonrestorative sleep, and concerns for sleep disordered breathing and obstructive sleep apnea. The patient's symptoms, particularly heavy snoring and witnessed episodes of apnea, are concerning for significant sleep disordered breathing and obstructive sleep apnea. We will obtain a home sleep test for further evaluation.  The patient will return for follow-up and recommendations after test.  I have discussed weight loss as it pertains to obstructive sleep apnea. (End Copied Text)    -A home sleep test was obtained on 11/6/2024 revealing moderate obstructive sleep apnea with an AHI of 21.5/h.     Interval History:  Ana Lincoln is a 64 y.o. female who presents for follow-up after home sleep test.  Patient was found with moderate obstructive sleep apnea as noted above.     Further details are as follows:    [place] Scale is: 0/24      Weight: 231 lb    Weight change in the last year:  loss: 6 lbs    The patient's relevant past medical, surgical, family, and social history reviewed and updated in Epic as appropriate.    PMH:    Past Medical History:   Diagnosis Date    Anxiety     Arthritis     Calculus of kidney     Carbuncle and furuncle of buttock 08/16/2012    Chalazion     GERD (gastroesophageal reflux disease)     Goiter     H/O bone density study 03/28/2018    nl    High cholesterol     Hyperlipidemia     Hypothyroid     Insomnia     Kidney stone     Lupus (systemic lupus erythematosus)     Persistent disorder of initiating or maintaining sleep     Routine general medical examination at a [facility] care facility 02/19/2013    Routine general medical  examination at a health care facility 2012    Sacroiliac pain     Sicca syndrome     Sleep apnea     Visit for routine gyn exam 2012     Past Surgical History:   Procedure Laterality Date    BREAST BIOPSY Left 2022    u/s    HERNIA REPAIR      HYSTERECTOMY      complete    KNEE ARTHROPLASTY, PARTIAL REPLACEMENT Bilateral     REPLACEMENT TOTAL KNEE BILATERAL      TOTAL ABDOMINAL HYSTERECTOMY WITH SALPINGO OOPHORECTOMY Bilateral     UMBILICAL HERNIA REPAIR       OB History          3    Para   3    Term   3            AB        Living             SAB        IAB        Ectopic        Molar        Multiple        Live Births   3              Allergies   Allergen Reactions    Morphine And Codeine Swelling     Tongue swells, doesn't help pain    Anesthetics, Amide Provider Review Needed    Bee Pollen Provider Review Needed    Erythromycin Nausea Only    Requip [Ropinirole Hcl] Nausea Only       MEDS:  Prior to Admission medications    Medication Sig Start Date End Date Taking? Authorizing Provider   cetirizine (ZyrTEC) 10 MG tablet Take 1 tablet by mouth Daily.    Provider, MD Serg   fluticasone (FLONASE) 50 MCG/ACT nasal spray 2 sprays into each nostril Daily. Administer 2 sprays in each nostril for each dose. 18   Bryanna Beckwith MD   hydroxychloroquine (PLAQUENIL) 200 MG tablet Take 1 tablet by mouth 2 (Two) Times a Day. 10/25/24   Matt Hernandez MD   levothyroxine (SYNTHROID, LEVOTHROID) 150 MCG tablet TAKE ONE TABLET BY MOUTH DAILY 12/3/21   Bryanna Beckwith MD   naproxen (NAPROSYN) 500 MG tablet TAKE 1 TABLET BY MOUTH EVERY EVENING AS NEEDED FOR MILD PAIN 21   Bryanna Beckwith MD   omeprazole (priLOSEC) 20 MG capsule TAKE ONE CAPSULE BY MOUTH DAILY *NEED APPOINTMENT FOR FURTHER REFILLS* 22   Bryanna Beckwith MD   pilocarpine (SALAGEN) 5 MG tablet Take 1 tablet by mouth Daily With Dinner. For dryness 10/25/24   Matt Hernandez MD  "  pravastatin (PRAVACHOL) 20 MG tablet Take 1 tablet by mouth every night at bedtime. 1/20/21   Bryanna Bekcwith MD   zolpidem (AMBIEN) 10 MG tablet Take 1 tablet by mouth At Night As Needed for Sleep. for sleep 4/20/21   Bryanna Beckwith MD         FH:  Family History   Problem Relation Age of Onset    Thyroid disease Mother     Memory loss Mother     Alzheimer's disease Mother     Arthritis Mother     Hypothyroidism Mother     Sjogren's syndrome Mother     Heart disease Father     Atrial fibrillation Father     Cirrhosis Father     Depression Father     Skin cancer Father     Dementia Father     Heart attack Father     Hypertension Father     Atrial fibrillation Sister     Supraventricular tachycardia Sister     Skin cancer Sister     Skin cancer Brother     Hypertension Brother     Alzheimer's disease Maternal Aunt     Breast cancer Maternal Grandmother 80    Ovarian cancer Neg Hx     BRCA 1/2 Neg Hx        Objective   Vital Signs:  /80   Pulse 74   Temp 98.5 °F (36.9 °C) (Oral)   Ht 165.1 cm (65\")   Wt 105 kg (231 lb)   SpO2 96%   BMI 38.44 kg/m²     Patient's (Body mass index is 38.44 kg/m².) indicates that they are obese (BMI >30) with health related conditions that include obstructive sleep apnea.             Physical Exam  Vitals reviewed.   Constitutional:       Appearance: Normal appearance.   HENT:      Head: Normocephalic and atraumatic.      Nose: Nose normal.      Mouth/Throat:      Mouth: Mucous membranes are moist.   Cardiovascular:      Rate and Rhythm: Normal rate and regular rhythm.      Heart sounds: No murmur heard.     No friction rub. No gallop.   Pulmonary:      Effort: Pulmonary effort is normal. No respiratory distress.      Breath sounds: Normal breath sounds. No wheezing or rhonchi.   Neurological:      Mental Status: She is alert and oriented to person, place, and time.   Psychiatric:         Behavior: Behavior normal.             Result Review :              Assessment " and Plan  Ana Lincoln is a 64 y.o. female who presents for follow-up after home sleep test obtained on 11/6/2024.  The patient was found with moderate obstructive sleep apnea with an AHI of 21.5/h.  Recommendation is for trial of PAP therapy.  I have discussed alternatives including MAD, and surgical consult including UPPP/uvulectomy. She has difficulty with claustrophobia and has concerns that this will preclude her from using the device.    I will place orders for new device and supplies and mask the patient return for follow-up in compliance in 31-90 days.  I have noted the patient will need to use the device more than 4 hours a night, more than 70% of the time in order to remain fully compliant.    Diagnoses and all orders for this visit:    1. Obstructive sleep apnea, adult (Primary)  -     PAP Therapy    2. Morbid (severe) obesity due to excess calories                    Follow Up  Return for 31 to 90 days after PAP setup.  Patient was given instructions and counseling regarding her condition or for health maintenance advice. Please see specific information pulled into the AVS if appropriate.       MICHELLE Acuna, ACNP-BC  Pulmonology, Critical Care, and Sleep Medicine

## 2025-02-10 ENCOUNTER — OFFICE VISIT (OUTPATIENT)
Dept: SLEEP MEDICINE | Age: 65
End: 2025-02-10
Payer: COMMERCIAL

## 2025-02-10 VITALS
HEIGHT: 65 IN | DIASTOLIC BLOOD PRESSURE: 80 MMHG | SYSTOLIC BLOOD PRESSURE: 128 MMHG | TEMPERATURE: 98.5 F | WEIGHT: 231 LBS | HEART RATE: 74 BPM | OXYGEN SATURATION: 96 % | BODY MASS INDEX: 38.49 KG/M2

## 2025-02-10 DIAGNOSIS — G47.33 OBSTRUCTIVE SLEEP APNEA, ADULT: Primary | ICD-10-CM

## 2025-02-10 DIAGNOSIS — E66.01 MORBID (SEVERE) OBESITY DUE TO EXCESS CALORIES: ICD-10-CM

## 2025-02-26 ENCOUNTER — OFFICE VISIT (OUTPATIENT)
Age: 65
End: 2025-02-26
Payer: COMMERCIAL

## 2025-02-26 ENCOUNTER — LAB (OUTPATIENT)
Facility: HOSPITAL | Age: 65
End: 2025-02-26
Payer: COMMERCIAL

## 2025-02-26 VITALS
HEART RATE: 85 BPM | SYSTOLIC BLOOD PRESSURE: 142 MMHG | HEIGHT: 65 IN | BODY MASS INDEX: 37.74 KG/M2 | DIASTOLIC BLOOD PRESSURE: 84 MMHG | TEMPERATURE: 97.6 F | WEIGHT: 226.5 LBS

## 2025-02-26 DIAGNOSIS — M35.00 SJOGREN'S SYNDROME, WITH UNSPECIFIED ORGAN INVOLVEMENT: Primary | Chronic | ICD-10-CM

## 2025-02-26 DIAGNOSIS — Z79.899 HIGH RISK MEDICATION USE: ICD-10-CM

## 2025-02-26 DIAGNOSIS — M35.00 SJOGREN'S SYNDROME, WITH UNSPECIFIED ORGAN INVOLVEMENT: Chronic | ICD-10-CM

## 2025-02-26 PROCEDURE — 86140 C-REACTIVE PROTEIN: CPT

## 2025-02-26 PROCEDURE — 36415 COLL VENOUS BLD VENIPUNCTURE: CPT

## 2025-02-26 PROCEDURE — 85652 RBC SED RATE AUTOMATED: CPT

## 2025-02-26 PROCEDURE — 86235 NUCLEAR ANTIGEN ANTIBODY: CPT

## 2025-02-26 PROCEDURE — 80053 COMPREHEN METABOLIC PANEL: CPT

## 2025-02-26 PROCEDURE — 85025 COMPLETE CBC W/AUTO DIFF WBC: CPT

## 2025-02-26 RX ORDER — HYDROXYCHLOROQUINE SULFATE 200 MG/1
200 TABLET, FILM COATED ORAL 2 TIMES DAILY
Qty: 60 TABLET | Refills: 5 | Status: SHIPPED | OUTPATIENT
Start: 2025-02-26 | End: 2025-02-26 | Stop reason: SDUPTHER

## 2025-02-26 RX ORDER — HYDROXYCHLOROQUINE SULFATE 200 MG/1
200 TABLET, FILM COATED ORAL 2 TIMES DAILY
Qty: 180 TABLET | Refills: 1 | Status: SHIPPED | OUTPATIENT
Start: 2025-02-26

## 2025-02-26 NOTE — PROGRESS NOTES
Office Visit       Date: 02/26/2025   Patient Name: Ana Lincoln  MRN: [id number]  YOB: 1960    Referring Physician: Jeanette Greenberg MD     Chief Complaint:   Chief Complaint   Patient presents with    Sjogren's Syndrome       History of Present Illness: Ana Lincoln is a 64 y.o. female who is here today in follow-up for Sjogren's disease with osteoarthritis and fibromyalgia.    The patient reports she was diagnosed around 2018 with Sjogren syndrome.  Her mother had Sjogren syndrome.  Labs have shown positive OPAL/positive SSA.  She reports chronic dry eyes and dry mouth.  She is also concerned she could have lupus.     She previously saw a rheumatologist in 2018 and was prescribed Plaquenil.    She does not recall whether it helped or not.  She denies Raynaud's, malar rash, photosensitivity, oral nasal ulcers, pleurisy/pericarditis, renal/hematologic otherwise, clotting disorder, seizure disorder, iritis, psoriasis.     She reports frequent widespread musculoskeletal pain.  Sometimes her muscles are sore to press on.  Her knees and spine ache frequently.  Activity makes it worse.  Rest makes it better.  Over-the-counter NSAIDs are helpful for her joints.     No swollen joints.  No fevers or weight loss.  She does have fatigue, dry mouth, dry eyes, difficulty swallowing.    She has not noticed any side effects to hydroxychloroquine which she restarted follow-up 2024.    No improvement with that either so far  She has not been taking pilocarpine as she struggles to remember to take it with dinner        Subjective     Review of Systems: Review of Systems   Constitutional:  Positive for appetite change, chills, fatigue and fever. Negative for unexpected weight loss.   HENT:  Positive for rhinorrhea, sinus pressure and sneezing. Negative for mouth sores and sore throat.    Eyes:  Negative for pain and redness.   Respiratory:  Negative for cough and shortness of  breath.    Cardiovascular:  Negative for chest pain.   Gastrointestinal:  Positive for abdominal pain, nausea, vomiting and indigestion. Negative for blood in stool, diarrhea and GERD.   Endocrine: Positive for cold intolerance. Negative for polydipsia and polyuria.   Genitourinary:  Positive for flank pain. Negative for dysuria, genital sores and hematuria.   Musculoskeletal:  Positive for arthralgias, joint swelling and myalgias. Negative for back pain, neck pain and neck stiffness.   Skin:  Negative for rash and bruise.   Allergic/Immunologic: Positive for environmental allergies.   Neurological:  Negative for seizures, weakness, numbness and memory problem.   Hematological:  Negative for adenopathy. Does not bruise/bleed easily.   Psychiatric/Behavioral:  Positive for stress. Negative for depressed mood. The patient is not nervous/anxious.         Past Medical History:   Past Medical History:   Diagnosis Date    Anxiety     Arthritis     Calculus of kidney     Carbuncle and furuncle of buttock 08/16/2012    Chalazion     GERD (gastroesophageal reflux disease)     Goiter     H/O bone density study 03/28/2018    nl    High cholesterol     Hyperlipidemia     Hypothyroid     Insomnia     Kidney stone     Lupus (systemic lupus erythematosus)     Persistent disorder of initiating or maintaining sleep     Routine general medical examination at a health care facility 02/19/2013    Routine general medical examination at a health care facility 02/16/2012    Sacroiliac pain     Sicca syndrome     Sleep apnea     Visit for routine gyn exam 02/16/2012       Past Surgical History:   Past Surgical History:   Procedure Laterality Date    BREAST BIOPSY Left 05/09/2022    u/s    HERNIA REPAIR      HYSTERECTOMY      complete    KNEE ARTHROPLASTY, PARTIAL REPLACEMENT Bilateral     REPLACEMENT TOTAL KNEE BILATERAL      TOTAL ABDOMINAL HYSTERECTOMY WITH SALPINGO OOPHORECTOMY Bilateral 2002    UMBILICAL HERNIA REPAIR         Family  History:   Family History   Problem Relation Age of Onset    Thyroid disease Mother     Memory loss Mother     Alzheimer's disease Mother     Arthritis Mother     Hypothyroidism Mother     Sjogren's syndrome Mother     Heart disease Father     Atrial fibrillation Father     Cirrhosis Father     Depression Father     Skin cancer Father     Dementia Father     Heart attack Father     Hypertension Father     Atrial fibrillation Sister     Supraventricular tachycardia Sister     Skin cancer Sister     Skin cancer Brother     Hypertension Brother     Alzheimer's disease Maternal Aunt     Breast cancer Maternal Grandmother 80    Ovarian cancer Neg Hx     BRCA 1/2 Neg Hx        Social History:   Social History     Socioeconomic History    Marital status:    Tobacco Use    Smoking status: Former     Passive exposure: Past    Smokeless tobacco: Never    Tobacco comments:     quit 30 + years   Vaping Use    Vaping status: Never Used   Substance and Sexual Activity    Alcohol use: Yes     Alcohol/week: 5.0 standard drinks of alcohol     Types: 5 Glasses of wine per week     Comment: red wine    Drug use: No    Sexual activity: Yes     Partners: Male     Birth control/protection: Post-menopausal, Surgical       Medications:   Current Outpatient Medications:     cetirizine (ZyrTEC) 10 MG tablet, Take 1 tablet by mouth Daily., Disp: , Rfl:     fluticasone (FLONASE) 50 MCG/ACT nasal spray, 2 sprays into each nostril Daily. Administer 2 sprays in each nostril for each dose., Disp: 16 g, Rfl: 5    hydroxychloroquine (PLAQUENIL) 200 MG tablet, Take 1 tablet by mouth 2 (Two) Times a Day., Disp: 180 tablet, Rfl: 1    levothyroxine (SYNTHROID, LEVOTHROID) 150 MCG tablet, TAKE ONE TABLET BY MOUTH DAILY, Disp: 30 tablet, Rfl: 4    naproxen (NAPROSYN) 500 MG tablet, TAKE 1 TABLET BY MOUTH EVERY EVENING AS NEEDED FOR MILD PAIN, Disp: 30 tablet, Rfl: 5    omeprazole (priLOSEC) 20 MG capsule, TAKE ONE CAPSULE BY MOUTH DAILY *NEED  "APPOINTMENT FOR FURTHER REFILLS*, Disp: 14 capsule, Rfl: 0    pravastatin (PRAVACHOL) 20 MG tablet, Take 1 tablet by mouth every night at bedtime., Disp: 30 tablet, Rfl: 5    zolpidem (AMBIEN) 10 MG tablet, Take 1 tablet by mouth At Night As Needed for Sleep. for sleep, Disp: 30 tablet, Rfl: 1    pilocarpine (SALAGEN) 5 MG tablet, Take 1 tablet by mouth Daily With Dinner. For dryness (Patient not taking: Reported on 2/26/2025), Disp: 30 tablet, Rfl: 5    Allergies:   Allergies   Allergen Reactions    Morphine And Codeine Swelling     Tongue swells, doesn't help pain    Anesthetics, Amide Unknown - Low Severity    Bee Pollen Unknown - Low Severity    Erythromycin Nausea Only    Requip [Ropinirole Hcl] Nausea Only         Objective      Vital Signs:   Vitals:    02/26/25 1055   BP: 142/84   BP Location: Left arm   Patient Position: Sitting   Cuff Size: Adult   Pulse: 85   Temp: 97.6 °F (36.4 °C)   Weight: 103 kg (226 lb 8 oz)   Height: 165.1 cm (65\")   PainSc: 4      Body mass index is 37.69 kg/m².       Physical Exam:  Physical Exam   MUSCULOSKELETAL:   No peripheral synovitis  Well-healed scar bilateral knees from joint replacement    Complete joint exam was performed including the MCPs, PIPs, DIPs of the hands, wrists, elbows, shoulders, hips, knees and ankles.  No soft tissue swelling or tenderness is present except as above.    General: The patient is well-developed and well nourished. Cooperative, alert and oriented. Affect is normal. Hydration appears normal.   HEENT: Normocephalic and atraumatic. Lids and conjunctiva are normal. Pupils are equal and sclera are clear. Oropharynx is clear   NECK neck is supple without adenopathy, masses or thyromegaly.   CARDIOVASCULAR: Regular rate and rhythm. No murmurs, rubs or gallops   LUNGS: Effort is normal. Lungs are clear bilateral   ABDOMEN: Not examined  EXTREMITIES: Peripheral pulses are intact. No clubbing.   SKIN: No rashes. No subcutaneous nodules. No digital " "ulcers. No sclerodactyly.   NEUROLOGIC: Gait is normal. Strength testing is normal.  No focal neurologic deficits    Results Review:   Labs:    Lab Results   Component Value Date    GLUCOSE 90 10/25/2024    BUN 15 10/25/2024    CREATININE 0.79 10/25/2024    EGFR 84.7 10/25/2024    BCR 19.0 10/25/2024    K 4.3 10/25/2024    CO2 25.6 10/25/2024    CALCIUM 9.0 10/25/2024    ALBUMIN 3.9 10/25/2024    BILITOT 0.2 10/25/2024    AST 15 10/25/2024    ALT 11 10/25/2024     Lab Results   Component Value Date    WBC 6.80 10/25/2024    HGB 13.2 10/25/2024    HCT 39.8 10/25/2024    MCV 89.0 10/25/2024     10/25/2024     Lab Results   Component Value Date    SEDRATE 22 10/25/2024     Lab Results   Component Value Date    CRP 0.76 (H) 10/25/2024     Lab Results   Component Value Date    QUANTIFERO Incubation performed. 10/25/2024    QUANTIFERO Comment 10/25/2024    QUANTITB1 0.20 10/25/2024    QUANTITB2 0.10 10/25/2024    QUANTIFERN 0.07 10/25/2024    QUANTIFERM >10.00 10/25/2024    QUANTITBGLDP Negative 10/25/2024     No results found for: \"RF\"  Lab Results   Component Value Date    HEPBSAG Non-Reactive 10/25/2024    HEPAIGM Non-Reactive 10/25/2024    HEPBIGMCORE Non-Reactive 10/25/2024    HEPCVIRUSABY Non-Reactive 10/25/2024           Procedures    Assessment / Plan        Sjogren's disease   with 3 children,  heritage  Mother had Sjogren's syndrome.    Clinical: +Chronic dry eyes and dry mouth.  Labs 6/5/2024: + OPAL, +SSA 1.3 / o/w negative WARREN panel, negative RF/sed rate, negative urinalysis, normal CBC/CMP  **Current: Plaquenil 10/25/2024, pilocarpine     She meets criteria for Sjogren syndrome with +OPAL/SSA with chronic dry eyes and dry mouth.    Her mother had Sjogren's.  Reassured her that I do not find evidence of coexistent lupus clinically or historically.  She does not meet criteria for lupus and specific autoantibodies for lupus are negative.  Suspect coexistent fibromyalgia and " osteoarthritis.  She has had both knees replaced with  orthopedics Dr. Schmidt.     -pilocarpine once daily for dryness  -We discussed there is no FDA approved medication for Sjogren's, but often Plaquenil can help with fatigue and arthralgias.  -Continue Plaquenil 200 mg twice daily  -Chest x-ray with no acute findings 10/24  -Discussed increased rate of lymphoma seen with Sjogren syndrome  -handout provided on Sjogren syndrome, fibromyalgia, osteoarthritis, Plaquenil  -Labs ordered for monitoring as below  -Return to clinic 6 months     High risk medication use  Plaquenil     We discussed possible side effects of hydroxychloroquine including headache, nausea, diarrhea, rare retinal pigmentation, rare neuromuscular toxicity.  Recommend eye exams every 6 to 12 months to monitor for retinal toxicity.    OPAL positive  + OPAL with SSA positivity but otherwise negative WARREN panel     Likely due to Sjogren syndrome.  No clinical evidence of coexistent lupus or other connective tissue disease.  Specific markers for lupus are negative    Fibromyalgia    I encouraged regular low-impact exercise up to 30 minutes/day.  If this is unattainable, recommended graded exercise program starting with 5 minutes of dedicated cardiovascular exercise daily, increasing by 1 minute daily until goal of 30 minutes daily is reached.    -Recommend conservative measures to improve sleep  -Consider referral to sleep medicine for GLORIA screening-   -Counseled on alternative therapies that can help with chronic pain including massage therapy, yoga, ace chi    Generalized osteoarthrosis, involving multiple sites  -Continue as needed NSAID  -Weight loss and Mediterranean diet would be helpful    History of partial bilateral knee replacement  Dr. Schmidt  orthopedics        Orders Placed This Encounter   Procedures    CBC Auto Differential    Comprehensive Metabolic Panel    C-reactive Protein    Sedimentation Rate    Urinalysis With Culture If Indicated  -    Protein / Creatinine Ratio, Urine - Urine, Clean Catch    Sjogren's Antibody, Anti-SS-A / -SS-B     New Medications Ordered This Visit   Medications    hydroxychloroquine (PLAQUENIL) 200 MG tablet     Sig: Take 1 tablet by mouth 2 (Two) Times a Day.     Dispense:  180 tablet     Refill:  1          Follow Up:   Return in about 6 months (around 8/26/2025).          Matt Hernandez MD  [facility] – [facility]

## 2025-02-27 LAB
ALBUMIN SERPL-MCNC: 4.2 G/DL (ref 3.5–5.2)
ALBUMIN/GLOB SERPL: 1.3 G/DL
ALP SERPL-CCNC: 72 U/L (ref 39–117)
ALT SERPL W P-5'-P-CCNC: 12 U/L (ref 1–33)
ANION GAP SERPL CALCULATED.3IONS-SCNC: 10 MMOL/L (ref 5–15)
AST SERPL-CCNC: 17 U/L (ref 1–32)
BASOPHILS # BLD AUTO: 0.04 10*3/MM3 (ref 0–0.2)
BASOPHILS NFR BLD AUTO: 0.7 % (ref 0–1.5)
BILIRUB SERPL-MCNC: 0.3 MG/DL (ref 0–1.2)
BUN SERPL-MCNC: 11 MG/DL (ref 8–23)
BUN/CREAT SERPL: 15.5 (ref 7–25)
CALCIUM SPEC-SCNC: 9.7 MG/DL (ref 8.6–10.5)
CHLORIDE SERPL-SCNC: 101 MMOL/L (ref 98–107)
CO2 SERPL-SCNC: 27 MMOL/L (ref 22–29)
CREAT SERPL-MCNC: 0.71 MG/DL (ref 0.57–1)
CRP SERPL-MCNC: 2.06 MG/DL (ref 0–0.5)
DEPRECATED RDW RBC AUTO: 41.8 FL (ref 37–54)
EGFRCR SERPLBLD CKD-EPI 2021: 96.3 ML/MIN/1.73
EOSINOPHIL # BLD AUTO: 0.16 10*3/MM3 (ref 0–0.4)
EOSINOPHIL NFR BLD AUTO: 2.8 % (ref 0.3–6.2)
ERYTHROCYTE [DISTWIDTH] IN BLOOD BY AUTOMATED COUNT: 12.8 % (ref 12.3–15.4)
ERYTHROCYTE [SEDIMENTATION RATE] IN BLOOD: 28 MM/HR (ref 0–30)
GLOBULIN UR ELPH-MCNC: 3.2 GM/DL
GLUCOSE SERPL-MCNC: 87 MG/DL (ref 65–99)
HCT VFR BLD AUTO: 40.6 % (ref 34–46.6)
HGB BLD-MCNC: 13.5 G/DL (ref 12–15.9)
IMM GRANULOCYTES # BLD AUTO: 0.02 10*3/MM3 (ref 0–0.05)
IMM GRANULOCYTES NFR BLD AUTO: 0.4 % (ref 0–0.5)
LYMPHOCYTES # BLD AUTO: 1.76 10*3/MM3 (ref 0.7–3.1)
LYMPHOCYTES NFR BLD AUTO: 31.1 % (ref 19.6–45.3)
MCH RBC QN AUTO: 29.8 PG (ref 26.6–33)
MCHC RBC AUTO-ENTMCNC: 33.3 G/DL (ref 31.5–35.7)
MCV RBC AUTO: 89.6 FL (ref 79–97)
MONOCYTES # BLD AUTO: 0.48 10*3/MM3 (ref 0.1–0.9)
MONOCYTES NFR BLD AUTO: 8.5 % (ref 5–12)
NEUTROPHILS NFR BLD AUTO: 3.2 10*3/MM3 (ref 1.7–7)
NEUTROPHILS NFR BLD AUTO: 56.5 % (ref 42.7–76)
NRBC BLD AUTO-RTO: 0 /100 WBC (ref 0–0.2)
PLATELET # BLD AUTO: 154 10*3/MM3 (ref 140–450)
PMV BLD AUTO: 11.4 FL (ref 6–12)
POTASSIUM SERPL-SCNC: 4.6 MMOL/L (ref 3.5–5.2)
PROT SERPL-MCNC: 7.4 G/DL (ref 6–8.5)
RBC # BLD AUTO: 4.53 10*6/MM3 (ref 3.77–5.28)
SODIUM SERPL-SCNC: 138 MMOL/L (ref 136–145)
WBC NRBC COR # BLD AUTO: 5.66 10*3/MM3 (ref 3.4–10.8)

## 2025-02-28 LAB
ENA SS-A AB SER-ACNC: 1 AI (ref 0–0.9)
ENA SS-B AB SER-ACNC: <0.2 AI (ref 0–0.9)

## 2025-04-25 NOTE — PROGRESS NOTES
Sleep Clinic Follow Up Note    Chief Complaint  Sleeping Problem and Sleep Apnea (Follow up)    Subjective     History of Present Illness (from previous encounter on 2/10/2025):  Ana Lincoln is a 64 y.o. female who presents for follow-up after home sleep test obtained on 11/6/2024.  The patient was found with moderate obstructive sleep apnea with an AHI of 21.5/h.  Recommendation is for trial of PAP therapy.  I have discussed alternatives including MAD, and surgical consult including UPPP/uvulectomy. She has difficulty with claustrophobia and has concerns that this will preclude her from using the device. (End copied text).    Interval History:  Ana Lincoln is a 64 y.o. female returns for follow up and compliance of PAP therapy. The patient was last seen on 2/10/2025 by me. Overall the patient feels poor with regard to therapy. She has had difficulty with allergies recently. The device appears to be working appropriately. On average the patient sleeps 4 hours per night. The patient wakes frequently. She had difficulty with pain waking her..     The patient reports the following changes to their medical and medication history since they were last seen:  None    Further details are as follows:      Gansevoort Scale is (out of 24): Total score: 3     Weight:  Current Weight: 229 lb    Weight change in the last year:  loss: 3 lbs    The patient's relevant past medical, surgical, family, and social history reviewed and updated in Epic as appropriate.    PMH:    Past Medical History:   Diagnosis Date    Anxiety     Arthritis     Calculus of kidney     Carbuncle and furuncle of buttock 08/16/2012    Chalazion     GERD (gastroesophageal reflux disease)     Goiter     H/O bone density study 03/28/2018    nl    High cholesterol     Hyperlipidemia     Hypothyroid     Insomnia     Kidney stone     Lupus (systemic lupus erythematosus)     Persistent disorder of initiating or maintaining sleep     Routine general  medical examination at a health care facility 2013    Routine general medical examination at a health care facility 2012    Sacroiliac pain     Sicca syndrome     Sleep apnea     Visit for routine gyn exam 2012     Past Surgical History:   Procedure Laterality Date    BREAST BIOPSY Left 2022    u/s    HERNIA REPAIR      HYSTERECTOMY      complete    KNEE ARTHROPLASTY, PARTIAL REPLACEMENT Bilateral     REPLACEMENT TOTAL KNEE BILATERAL      TOTAL ABDOMINAL HYSTERECTOMY WITH SALPINGO OOPHORECTOMY Bilateral     UMBILICAL HERNIA REPAIR       OB History          3    Para   3    Term   3            AB        Living             SAB        IAB        Ectopic        Molar        Multiple        Live Births   3              Allergies   Allergen Reactions    Morphine And Codeine Swelling     Tongue swells, doesn't help pain    Anesthetics, Amide Unknown - Low Severity    Bee Pollen Unknown - Low Severity    Erythromycin Nausea Only    Requip [Ropinirole Hcl] Nausea Only       MEDS:  Prior to Admission medications    Medication Sig Start Date End Date Taking? Authorizing Provider   cetirizine (ZyrTEC) 10 MG tablet Take 1 tablet by mouth Daily.    Provider, MD Serg   fluticasone (FLONASE) 50 MCG/ACT nasal spray 2 sprays into each nostril Daily. Administer 2 sprays in each nostril for each dose. 18   Bryanna Beckwith MD   hydroxychloroquine (PLAQUENIL) 200 MG tablet Take 1 tablet by mouth 2 (Two) Times a Day. 25   Matt Hernandez MD   levothyroxine (SYNTHROID, LEVOTHROID) 150 MCG tablet TAKE ONE TABLET BY MOUTH DAILY 12/3/21   Bryanna Beckwith MD   naproxen (NAPROSYN) 500 MG tablet TAKE 1 TABLET BY MOUTH EVERY EVENING AS NEEDED FOR MILD PAIN 21   Bryanna Beckwith MD   omeprazole (priLOSEC) 20 MG capsule TAKE ONE CAPSULE BY MOUTH DAILY *NEED APPOINTMENT FOR FURTHER REFILLS* 22   Bryanna Beckwith MD   pilocarpine (SALAGEN) 5 MG tablet Take 1 tablet by  "mouth Daily With Dinner. For dryness  Patient not taking: Reported on 2/26/2025 10/25/24   Matt Hernandez MD   pravastatin (PRAVACHOL) 20 MG tablet Take 1 tablet by mouth every night at bedtime. 1/20/21   Bryanna Beckwith MD   zolpidem (AMBIEN) 10 MG tablet Take 1 tablet by mouth At Night As Needed for Sleep. for sleep 4/20/21   Bryanna Beckwith MD         FH:  Family History   Problem Relation Age of Onset    Thyroid disease Mother     Memory loss Mother     Alzheimer's disease Mother     Arthritis Mother     Hypothyroidism Mother     Sjogren's syndrome Mother     Heart disease Father     Atrial fibrillation Father     Cirrhosis Father     Depression Father     Skin cancer Father     Dementia Father     Heart attack Father     Hypertension Father     Atrial fibrillation Sister     Supraventricular tachycardia Sister     Skin cancer Sister     Skin cancer Brother     Hypertension Brother     Alzheimer's disease Maternal Aunt     Breast cancer Maternal Grandmother 80    Ovarian cancer Neg Hx     BRCA 1/2 Neg Hx        Objective   Vital Signs:  /70 (BP Location: Left arm, Patient Position: Sitting, Cuff Size: Adult)   Pulse 76   Temp 97.7 °F (36.5 °C) (Temporal)   Ht 165.1 cm (65\")   Wt 104 kg (229 lb 9.6 oz)   SpO2 96%   BMI 38.21 kg/m²     Patient's (Body mass index is 38.21 kg/m².) indicates that they are obese (BMI >30) with health related conditions that include obstructive sleep apnea .         Physical Exam  Vitals reviewed.   Constitutional:       Appearance: Normal appearance.   HENT:      Head: Normocephalic and atraumatic.      Nose: Nose normal.      Mouth/Throat:      Mouth: Mucous membranes are moist.   Cardiovascular:      Rate and Rhythm: Normal rate and regular rhythm.      Heart sounds: No murmur heard.     No friction rub. No gallop.   Pulmonary:      Effort: Pulmonary effort is normal. No respiratory distress.      Breath sounds: Normal breath sounds. No wheezing or rhonchi. "   Neurological:      Mental Status: She is alert and oriented to person, place, and time.   Psychiatric:         Behavior: Behavior normal.           Result Review :           PAP Report:  AHI: 1.2/h  Days of Usage: 30/30 (100%)  Number of Days Greater than 4 hours: 63%  Average time (days used): 4 hours 46 minutes  95th Percentile Pressure: 9.4 cmH2O  95th percentile leaks: 10.1 L/min  Settings: Auto CPAP-8/18 cm H2O, EPR full-time, EPR level 2, response standard.       Assessment and Plan  Ana Lincoln is a 64 y.o. female who returns for follow-up and compliance of PAP therapy.  The Pap report has been reviewed.  Overall usage is at 100% with compliance at 63%.  The patient averaged 4 hours and 46 minutes of therapy.  Sleep apnea is well-controlled with an AHI of 1.2/h. I will refill the patient's supplies, and I have asked them to return for follow-up and compliance in 1 year or sooner should they have further questions or concerns.    Diagnoses and all orders for this visit:    1. Obstructive sleep apnea, adult (Primary)  -     PAP Therapy    2. Morbid (severe) obesity due to excess calories         The patient continues to use and benefit from PAP therapy.    1. The patient was counseled regarding multimodal approach with healthy nutrition, healthy sleep, regular physical activity, social activities, counseling, and medications. Encouraged to practice lateral sleep position. Avoid alcohol and sedatives close to bedtime.     2.  We will refill supplies x1 year.  Return to clinic 1 year or sooner if symptoms warrant. I have reviewed the results of my evaluation and impression and discussed my recommendations in detail with the patient.           Follow Up  Return in about 1 year (around 4/30/2026) for Annual visit.  Patient was given instructions and counseling regarding her condition or for health maintenance advice. Please see specific information pulled into the AVS if appropriate.       Monico HORN  MICHELLE Robb, ACNP-BC  Pulmonology, Critical Care, and Sleep Medicine

## 2025-04-30 ENCOUNTER — OFFICE VISIT (OUTPATIENT)
Dept: SLEEP MEDICINE | Age: 65
End: 2025-04-30
Payer: COMMERCIAL

## 2025-04-30 VITALS
SYSTOLIC BLOOD PRESSURE: 120 MMHG | HEIGHT: 65 IN | HEART RATE: 76 BPM | TEMPERATURE: 97.7 F | WEIGHT: 229.6 LBS | DIASTOLIC BLOOD PRESSURE: 70 MMHG | OXYGEN SATURATION: 96 % | BODY MASS INDEX: 38.25 KG/M2

## 2025-04-30 DIAGNOSIS — E66.01 MORBID (SEVERE) OBESITY DUE TO EXCESS CALORIES: ICD-10-CM

## 2025-04-30 DIAGNOSIS — G47.33 OBSTRUCTIVE SLEEP APNEA, ADULT: Primary | ICD-10-CM

## 2025-07-15 ENCOUNTER — TRANSCRIBE ORDERS (OUTPATIENT)
Dept: ADMINISTRATIVE | Facility: HOSPITAL | Age: 65
End: 2025-07-15
Payer: COMMERCIAL

## 2025-07-15 DIAGNOSIS — Z12.31 VISIT FOR SCREENING MAMMOGRAM: Primary | ICD-10-CM

## 2025-08-18 LAB
NCCN CRITERIA FLAG: NORMAL
TYRER CUZICK SCORE: 11.4

## 2025-08-26 ENCOUNTER — OFFICE VISIT (OUTPATIENT)
Age: 65
End: 2025-08-26
Payer: COMMERCIAL

## 2025-08-26 ENCOUNTER — TELEPHONE (OUTPATIENT)
Age: 65
End: 2025-08-26

## 2025-08-26 VITALS
WEIGHT: 229 LBS | BODY MASS INDEX: 38.15 KG/M2 | SYSTOLIC BLOOD PRESSURE: 124 MMHG | HEIGHT: 65 IN | DIASTOLIC BLOOD PRESSURE: 76 MMHG | TEMPERATURE: 97.3 F | HEART RATE: 83 BPM

## 2025-08-26 DIAGNOSIS — M79.7 FIBROMYALGIA: ICD-10-CM

## 2025-08-26 DIAGNOSIS — M15.9 GENERALIZED OSTEOARTHROSIS, INVOLVING MULTIPLE SITES: ICD-10-CM

## 2025-08-26 DIAGNOSIS — M35.00 SJOGREN'S SYNDROME, WITH UNSPECIFIED ORGAN INVOLVEMENT: Primary | Chronic | ICD-10-CM

## 2025-08-26 DIAGNOSIS — Z79.1 NSAID LONG-TERM USE: ICD-10-CM

## 2025-08-26 DIAGNOSIS — R76.8 ANA POSITIVE: ICD-10-CM

## 2025-08-26 DIAGNOSIS — Z79.899 HIGH RISK MEDICATION USE: ICD-10-CM

## 2025-08-26 RX ORDER — PILOCARPINE HYDROCHLORIDE 5 MG/1
5 TABLET, FILM COATED ORAL DAILY PRN
Qty: 30 TABLET | Refills: 5 | Status: SHIPPED | OUTPATIENT
Start: 2025-08-26

## 2025-08-26 RX ORDER — LEVOTHYROXINE SODIUM 175 UG/1
1 CAPSULE ORAL DAILY
COMMUNITY
Start: 2025-08-18

## 2025-08-26 RX ORDER — HYDROXYCHLOROQUINE SULFATE 200 MG/1
200 TABLET, FILM COATED ORAL 2 TIMES DAILY
Qty: 180 TABLET | Refills: 1 | Status: SHIPPED | OUTPATIENT
Start: 2025-08-26